# Patient Record
Sex: FEMALE | Race: WHITE | Employment: UNEMPLOYED | URBAN - METROPOLITAN AREA
[De-identification: names, ages, dates, MRNs, and addresses within clinical notes are randomized per-mention and may not be internally consistent; named-entity substitution may affect disease eponyms.]

---

## 2017-05-24 ENCOUNTER — HOSPITAL ENCOUNTER (INPATIENT)
Age: 22
LOS: 6 days | Discharge: HOME | DRG: 753 | End: 2017-05-31
Attending: EMERGENCY MEDICINE | Admitting: PSYCHIATRY & NEUROLOGY
Payer: MEDICAID

## 2017-05-24 DIAGNOSIS — R46.89 AGGRESSIVE BEHAVIOR: ICD-10-CM

## 2017-05-24 DIAGNOSIS — R45.851 SUICIDAL IDEATION: Primary | ICD-10-CM

## 2017-05-24 DIAGNOSIS — N30.00 ACUTE CYSTITIS WITHOUT HEMATURIA: ICD-10-CM

## 2017-05-24 LAB
ALBUMIN SERPL BCP-MCNC: 3.2 G/DL (ref 3.4–5)
ALBUMIN/GLOB SERPL: 0.9 {RATIO} (ref 0.8–1.7)
ALP SERPL-CCNC: 94 U/L (ref 45–117)
ALT SERPL-CCNC: 24 U/L (ref 13–56)
AMORPH CRY URNS QL MICRO: ABNORMAL
AMPHET UR QL SCN: POSITIVE
ANION GAP BLD CALC-SCNC: 8 MMOL/L (ref 3–18)
APAP SERPL-MCNC: <2 UG/ML (ref 10–30)
APPEARANCE UR: CLEAR
AST SERPL W P-5'-P-CCNC: 10 U/L (ref 15–37)
BACTERIA URNS QL MICRO: ABNORMAL /HPF
BARBITURATES UR QL SCN: NEGATIVE
BASOPHILS # BLD AUTO: 0.1 K/UL (ref 0–0.1)
BASOPHILS # BLD: 1 % (ref 0–2)
BENZODIAZ UR QL: NEGATIVE
BILIRUB DIRECT SERPL-MCNC: <0.1 MG/DL (ref 0–0.2)
BILIRUB SERPL-MCNC: 0.2 MG/DL (ref 0.2–1)
BILIRUB UR QL: NEGATIVE
BUN SERPL-MCNC: 12 MG/DL (ref 7–18)
BUN/CREAT SERPL: 14 (ref 12–20)
CALCIUM SERPL-MCNC: 9.2 MG/DL (ref 8.5–10.1)
CANNABINOIDS UR QL SCN: NEGATIVE
CHLORIDE SERPL-SCNC: 109 MMOL/L (ref 100–108)
CO2 SERPL-SCNC: 23 MMOL/L (ref 21–32)
COCAINE UR QL SCN: NEGATIVE
COLOR UR: YELLOW
CREAT SERPL-MCNC: 0.84 MG/DL (ref 0.6–1.3)
DIFFERENTIAL METHOD BLD: ABNORMAL
EOSINOPHIL # BLD: 0.2 K/UL (ref 0–0.4)
EOSINOPHIL NFR BLD: 2 % (ref 0–5)
EPITH CASTS URNS QL MICRO: ABNORMAL /LPF (ref 0–5)
ERYTHROCYTE [DISTWIDTH] IN BLOOD BY AUTOMATED COUNT: 13.1 % (ref 11.6–14.5)
ETHANOL SERPL-MCNC: <3 MG/DL (ref 0–3)
GLOBULIN SER CALC-MCNC: 3.7 G/DL (ref 2–4)
GLUCOSE SERPL-MCNC: 84 MG/DL (ref 74–99)
GLUCOSE UR STRIP.AUTO-MCNC: NEGATIVE MG/DL
HCG UR QL: NEGATIVE
HCT VFR BLD AUTO: 41 % (ref 35–45)
HDSCOM,HDSCOM: ABNORMAL
HGB BLD-MCNC: 14 G/DL (ref 12–16)
HGB UR QL STRIP: NEGATIVE
KETONES UR QL STRIP.AUTO: ABNORMAL MG/DL
LEUKOCYTE ESTERASE UR QL STRIP.AUTO: ABNORMAL
LYMPHOCYTES # BLD AUTO: 28 % (ref 21–52)
LYMPHOCYTES # BLD: 2.8 K/UL (ref 0.9–3.6)
MCH RBC QN AUTO: 31.2 PG (ref 24–34)
MCHC RBC AUTO-ENTMCNC: 34.1 G/DL (ref 31–37)
MCV RBC AUTO: 91.3 FL (ref 74–97)
METHADONE UR QL: NEGATIVE
MONOCYTES # BLD: 0.7 K/UL (ref 0.05–1.2)
MONOCYTES NFR BLD AUTO: 7 % (ref 3–10)
MUCOUS THREADS URNS QL MICRO: ABNORMAL /LPF
NEUTS SEG # BLD: 6.4 K/UL (ref 1.8–8)
NEUTS SEG NFR BLD AUTO: 62 % (ref 40–73)
NITRITE UR QL STRIP.AUTO: NEGATIVE
OPIATES UR QL: NEGATIVE
PCP UR QL: NEGATIVE
PH UR STRIP: 5 [PH] (ref 5–8)
PLATELET # BLD AUTO: 219 K/UL (ref 135–420)
PMV BLD AUTO: 13 FL (ref 9.2–11.8)
POTASSIUM SERPL-SCNC: 3.7 MMOL/L (ref 3.5–5.5)
PROT SERPL-MCNC: 6.9 G/DL (ref 6.4–8.2)
PROT UR STRIP-MCNC: NEGATIVE MG/DL
RBC # BLD AUTO: 4.49 M/UL (ref 4.2–5.3)
RBC #/AREA URNS HPF: ABNORMAL /HPF (ref 0–5)
SALICYLATES SERPL-MCNC: <2.8 MG/DL (ref 2.8–20)
SODIUM SERPL-SCNC: 140 MMOL/L (ref 136–145)
SP GR UR REFRACTOMETRY: 1.03 (ref 1–1.03)
UROBILINOGEN UR QL STRIP.AUTO: 1 EU/DL (ref 0.2–1)
WBC # BLD AUTO: 10.1 K/UL (ref 4.6–13.2)
WBC URNS QL MICRO: ABNORMAL /HPF (ref 0–4)

## 2017-05-24 PROCEDURE — 81025 URINE PREGNANCY TEST: CPT | Performed by: PHYSICIAN ASSISTANT

## 2017-05-24 PROCEDURE — 80307 DRUG TEST PRSMV CHEM ANLYZR: CPT | Performed by: PHYSICIAN ASSISTANT

## 2017-05-24 PROCEDURE — 99283 EMERGENCY DEPT VISIT LOW MDM: CPT

## 2017-05-24 PROCEDURE — 85025 COMPLETE CBC W/AUTO DIFF WBC: CPT | Performed by: PHYSICIAN ASSISTANT

## 2017-05-24 PROCEDURE — 80076 HEPATIC FUNCTION PANEL: CPT | Performed by: PHYSICIAN ASSISTANT

## 2017-05-24 PROCEDURE — 80048 BASIC METABOLIC PNL TOTAL CA: CPT | Performed by: PHYSICIAN ASSISTANT

## 2017-05-24 PROCEDURE — 80307 DRUG TEST PRSMV CHEM ANLYZR: CPT | Performed by: EMERGENCY MEDICINE

## 2017-05-24 PROCEDURE — 81001 URINALYSIS AUTO W/SCOPE: CPT | Performed by: EMERGENCY MEDICINE

## 2017-05-24 RX ORDER — TOPIRAMATE 100 MG/1
100 TABLET, FILM COATED ORAL 2 TIMES DAILY
COMMUNITY
End: 2017-05-31

## 2017-05-24 RX ORDER — PRAVASTATIN SODIUM 40 MG/1
40 TABLET ORAL
Status: ON HOLD | COMMUNITY
End: 2017-05-31

## 2017-05-24 NOTE — IP AVS SNAPSHOT
Golda Hodgkin 
 
 
 [address] Patient: Juan Daniel Carlson MRN: [id number] [facility]:14[birth year]53[birth year]33 You are allergic to the following No active allergies Recent Documentation Height Weight BMI OB Status Smoking Status 1.676 m 77.1 kg 27.44 kg/m2 Having regular periods Never Smoker Emergency Contacts Name Discharge Info Relation Home Work Mobile Rossy Michelle DISCHARGE CAREGIVER [3] Parent [1] [phone] [facility] DISCHARGE CAREGIVER [3] Father [15] [phone] About your hospitalization You were admitted on:  May 25, 2017 You last received care in the:  [facility] You were discharged on:  May 31, 2017 Unit phone number:  [phone] Why you were hospitalized Your primary diagnosis was:  Bipolar 1 Disorder (Hcc) Your diagnoses also included:  Attention Deficit Hyperactivity Disorder (Adhd), Combined Type Providers Seen During Your Hospitalizations Provider Role Specialty Primary office phone Anna Skelton MD Attending Provider Emergency Medicine [phone] Yuki Kang MD Attending Provider Psychiatry [phone] Your Primary Care Physician (PCP) Primary Care Physician Office Phone Office Fax Ciradeacon Chrissy [phone] Follow-up Information Follow up With Details Comments Contact Info The patient is currently connected with the [facility] and the  has been providing updates to Ms. Ardelle Cogan [phone]. The patient has an appointment at [facility] with Terry Masterson on 6/14/17 at 3 pm. The address and contact number is [address] [place] [address], [address] [place] [address]; Phone: [phone]; Fax: [phone]. The patient is also seen at [facility] by Ms. Abraham Seaman; has an appointment on 7/19/17 at 10:15 am. The address and contact number is [address], [address], [place], [address] [facility]; Phone: [phone]; Fax: [phone]) The patient has home therapy with [address] located at [address] [address]; Fax: [phone]. Her worker is Ms. Mandy Hopper [phone] The patient is also connected with [facility] and has an evaluation scheduled (with Maxwell Morse) on 6/28/17; she will also be learning life skills with them. Numbers to remember card given Current Discharge Medication List  
  
START taking these medications Dose & Instructions Dispensing Information Comments Morning Noon Evening Bedtime  
 cephALEXin 500 mg capsule Commonly known as:  Nahid Phy Your last dose was: Your next dose is:    
   
   
 Dose:  500 mg Take 1 Cap by mouth two (2) times a day. Indications: infection Quantity:  1 Cap Refills:  0  
     
   
   
   
  
 guanFACINE IR 2 mg IR tablet Commonly known as:  Shaun Covarrubias Replaces:  Intuniv 4 mg Tb24 ER tablet Your last dose was: Your next dose is:    
   
   
 Dose:  2 mg Take 1 Tab by mouth daily (with dinner). Indications: ATTENTION-DEFICIT HYPERACTIVITY DISORDER Quantity:  30 Tab Refills:  0 OXcarbaxepine 600 mg tablet Commonly known as:  TRILEPTAL Your last dose was: Your next dose is:    
   
   
 Dose:  600 mg Take 1 Tab by mouth two (2) times a day. Indications: mood stabilization Quantity:  60 Tab Refills:  0 CONTINUE these medications which have CHANGED Dose & Instructions Dispensing Information Comments Morning Noon Evening Bedtime  
 lisdexamfetamine 30 mg capsule Commonly known as:  VYVANSE What changed:   
- medication strength - when to take this Your last dose was:     
   
Your next dose is:    
   
   
 Dose:  30 mg  
 Take 1 Cap (30 mg total) by mouth dailyIndications: ATTENTION-DEFICIT HYPERACTIVITY DISORDER. Max Daily Amount: 30 mg  
 Quantity:  30 Cap Refills:  0  
     
   
   
   
  
 ziprasidone 80 mg capsule Commonly known as:  Juan Pringle What changed:   
- medication strength 
- how much to take - when to take this - Another medication with the same name was removed. Continue taking this medication, and follow the directions you see here. Your last dose was: Your next dose is:    
   
   
 Dose:  80 mg Take 1 Cap by mouth two (2) times daily (with meals). Indications: mood stabilization Quantity:  60 Cap Refills:  0 CONTINUE these medications which have NOT CHANGED Dose & Instructions Dispensing Information Comments Morning Noon Evening Bedtime PRAVACHOL 40 mg tablet Generic drug:  pravastatin Your last dose was: Your next dose is:    
   
   
 Dose:  40 mg Take 1 Tab by mouth nightly. Indications: hypercholesterolemia Refills:  0 STOP taking these medications   
 citalopram 20 mg tablet Commonly known as:  Miguelito Hooper Intuniv 4 mg Tb24 ER tablet Generic drug:  guanFACINE ER  
Replaced by:  guanFACINE IR 2 mg IR tablet  
   
  
 topiramate 100 mg tablet Commonly known as:  TOPAMAX Where to Get Your Medications Information on where to get these meds will be given to you by the nurse or doctor. ! Ask your nurse or doctor about these medications  
  cephALEXin 500 mg capsule  
 guanFACINE IR 2 mg IR tablet  
 lisdexamfetamine 30 mg capsule OXcarbaxepine 600 mg tablet  
 ziprasidone 80 mg capsule Discharge Instructions BEHAVIORAL HEALTH NURSING DISCHARGE NOTE The following personal items collected during your admission are returned to you:  
Dental Appliance: Dental Appliances:  
Vision:   
Hearing Aid:   
Jewelry: Jewelry:  
Clothing: Clothing: Other Valuables: Other Valuables:  
Valuables sent to safe: Personal Items Sent to Safe:  
 
 
 
PATIENT INSTRUCTIONS: 
 
Diet: Regular The discharge information has been reviewed with the patient. The patient verbalized understanding. Patient armband removed and shredded. Discharge Orders None Introducing [facility]! Sara Cantu introduces [employer] patient portal. Now you can access parts of your medical record, email your doctor's office, and request medication refills online. 1. In your internet browser, go to [email or web address] 2. Click on the First Time User? Click Here link in the Sign In box. You will see the New Member Sign Up page. 3. Enter your Data[employer] Access Code exactly as it appears below. You will not need to use this code after youve completed the sign-up process. If you do not sign up before the expiration date, you must request a new code. · Data[employer] Access Code: [email or web address] Expires: 8/23/2017  7:32 AM 
 
4. Enter the last four digits of your Social Security Number (xxxx) and Date of Birth (mm/dd/yyyy) as indicated and click Submit. You will be taken to the next sign-up page. 5. Create a Data[employer]raise ID. This will be your [employer] login ID and cannot be changed, so think of one that is secure and easy to remember. 6. Create a [employer] password. You can change your password at any time. 7. Enter your Password Reset Question and Answer. This can be used at a later time if you forget your password. 8. Enter your e-mail address. You will receive e-mail notification when new information is available in [address] 9. Click Sign Up. You can now view and download portions of your medical record. 10. Click the Download Summary menu link to download a portable copy of your medical information. If you have questions, please visit the Frequently Asked Questions section of the [employer] website.  Remember, [employer] is NOT to be used for urgent needs. For medical emergencies, dial 911. Now available from your iPhone and Android! General Information Please provide this summary of care documentation to your next provider. Patient Signature:  ____________________________________________________________ Date:  ____________________________________________________________  
  
Leonidas End Provider Signature:  ____________________________________________________________ Date:  ____________________________________________________________

## 2017-05-25 PROBLEM — F31.9 BIPOLAR 1 DISORDER (HCC): Status: ACTIVE | Noted: 2017-05-25

## 2017-05-25 PROCEDURE — 74011250637 HC RX REV CODE- 250/637: Performed by: PHYSICIAN ASSISTANT

## 2017-05-25 PROCEDURE — 74011250637 HC RX REV CODE- 250/637: Performed by: PSYCHIATRY & NEUROLOGY

## 2017-05-25 PROCEDURE — 65220000003 HC RM SEMIPRIVATE PSYCH

## 2017-05-25 RX ORDER — HALOPERIDOL 5 MG/1
5 TABLET ORAL
Status: DISCONTINUED | OUTPATIENT
Start: 2017-05-25 | End: 2017-05-31 | Stop reason: HOSPADM

## 2017-05-25 RX ORDER — HYDROXYZINE PAMOATE 50 MG/1
50 CAPSULE ORAL
Status: DISCONTINUED | OUTPATIENT
Start: 2017-05-25 | End: 2017-05-31 | Stop reason: HOSPADM

## 2017-05-25 RX ORDER — GUANFACINE HYDROCHLORIDE 1 MG/1
2 TABLET ORAL
Status: DISCONTINUED | OUTPATIENT
Start: 2017-05-25 | End: 2017-05-31 | Stop reason: HOSPADM

## 2017-05-25 RX ORDER — ZIPRASIDONE HYDROCHLORIDE 40 MG/1
40 CAPSULE ORAL
Status: DISCONTINUED | OUTPATIENT
Start: 2017-05-25 | End: 2017-05-28

## 2017-05-25 RX ORDER — CEPHALEXIN 250 MG/1
500 CAPSULE ORAL 2 TIMES DAILY
Status: DISCONTINUED | OUTPATIENT
Start: 2017-05-25 | End: 2017-05-31 | Stop reason: HOSPADM

## 2017-05-25 RX ORDER — HALOPERIDOL 5 MG/ML
5 INJECTION INTRAMUSCULAR
Status: DISCONTINUED | OUTPATIENT
Start: 2017-05-25 | End: 2017-05-31 | Stop reason: HOSPADM

## 2017-05-25 RX ORDER — TOPIRAMATE 100 MG/1
100 TABLET, FILM COATED ORAL
Status: DISCONTINUED | OUTPATIENT
Start: 2017-05-25 | End: 2017-05-26

## 2017-05-25 RX ORDER — IBUPROFEN 600 MG/1
600 TABLET ORAL
Status: DISCONTINUED | OUTPATIENT
Start: 2017-05-25 | End: 2017-05-31 | Stop reason: HOSPADM

## 2017-05-25 RX ORDER — CEPHALEXIN 250 MG/1
500 CAPSULE ORAL
Status: COMPLETED | OUTPATIENT
Start: 2017-05-25 | End: 2017-05-25

## 2017-05-25 RX ORDER — TRAZODONE HYDROCHLORIDE 50 MG/1
50 TABLET ORAL
Status: DISCONTINUED | OUTPATIENT
Start: 2017-05-25 | End: 2017-05-28

## 2017-05-25 RX ORDER — LORAZEPAM 2 MG/ML
1-2 INJECTION INTRAMUSCULAR
Status: DISCONTINUED | OUTPATIENT
Start: 2017-05-25 | End: 2017-05-31 | Stop reason: HOSPADM

## 2017-05-25 RX ORDER — CITALOPRAM 20 MG/1
20 TABLET, FILM COATED ORAL DAILY
Status: DISCONTINUED | OUTPATIENT
Start: 2017-05-25 | End: 2017-05-25

## 2017-05-25 RX ORDER — TOPIRAMATE 25 MG/1
50 TABLET ORAL
Status: DISCONTINUED | OUTPATIENT
Start: 2017-05-25 | End: 2017-05-26

## 2017-05-25 RX ADMIN — CEPHALEXIN 500 MG: 250 CAPSULE ORAL at 20:38

## 2017-05-25 RX ADMIN — TOPIRAMATE 50 MG: 25 TABLET, FILM COATED ORAL at 08:42

## 2017-05-25 RX ADMIN — CEPHALEXIN 500 MG: 250 CAPSULE ORAL at 08:41

## 2017-05-25 RX ADMIN — ZIPRASIDONE HYDROCHLORIDE 40 MG: 40 CAPSULE ORAL at 08:57

## 2017-05-25 RX ADMIN — GUANFACINE HYDROCHLORIDE 2 MG: 1 TABLET ORAL at 16:58

## 2017-05-25 RX ADMIN — TOPIRAMATE 100 MG: 100 TABLET, FILM COATED ORAL at 16:59

## 2017-05-25 RX ADMIN — CEPHALEXIN 500 MG: 250 CAPSULE ORAL at 04:45

## 2017-05-25 RX ADMIN — LISDEXAMFETAMINE DIMESYLATE 30 MG: 30 CAPSULE ORAL at 08:42

## 2017-05-25 RX ADMIN — CITALOPRAM HYDROBROMIDE 20 MG: 20 TABLET ORAL at 13:25

## 2017-05-25 RX ADMIN — ZIPRASIDONE HCL 120 MG: 80 CAPSULE ORAL at 16:59

## 2017-05-25 NOTE — BSMART NOTE
OCCUPATIONAL THERAPY PROGRESS NOTE    Group Time:  2287  Attendance: The patient attended full group. .  Participation:  The patient participated fully in the activity. Attention:  The patient was able to focus on the activity. Interaction:  The patient occasionally  interacts with others. Able to focus on activity, concrete at times, ID of needing to change her \"temper\" and talked about things she could do when angry to help this, which were appropriate. Did say she was hoping to go to \"group home\" or apartment.
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
## 2017-05-25 NOTE — BH NOTES
GROUP THERAPY PROGRESS NOTE    Walker Ruiz is participating in Recreational Therapy.      Group time: 30 min    Personal goal for participation: Keeping body healthy    Goal orientation: relaxation    Group therapy participation: active    Therapeutic interventions reviewed and discussed: Ways to maintain good health    Impression of participation: Pt participated in group

## 2017-05-25 NOTE — ED NOTES
Per care giver, pt has been aggressive and uncooperative recently. She has been dating a boy with autism and he has not been able to visit her. Caregiver believes this may be the catalyst to her behavior. Caregiver has a large plot of land and pt wandered off today. She states she wants to hurt herself by consuming sugar because she is prediabetic. She has no other plan. She does have hx of bipolar, ADHD, ODD, and mild MR.

## 2017-05-25 NOTE — BSMART NOTE
GROUP THERAPY PROGRESS NOTE    Nelli Benton is participating in [place]. Group time: 30 minutes    Personal goal for participation: to go home    Goal orientation: community    Group therapy participation: active    Therapeutic interventions reviewed and discussed: unit rules an guidelines    Impression of participation: pt attended group.

## 2017-05-25 NOTE — H&P
History and Physical        Patient: Carlos Osuna               Sex: female          DOA: 5/24/2017         YOB: 1995      Age:  22 y.o.        LOS:  LOS: 0 days        HPI:     Carlos Osuna is a 22 y.o. female who was admitted experiencing suicidal ideation, out of control behavior, and increased aggression    Active Problems:    Bipolar 1 disorder ([facility] (5/25/2017)        Past Medical History:   Diagnosis Date    ADHD (attention deficit hyperactivity disorder)     Bipolar disorder ([facility]     Borderline personality disorder     Mild mental retardation     PMS (premenstrual syndrome)     Thromboembolus ([facility] 2013    PE       History reviewed. No pertinent surgical history. History reviewed. No pertinent family history. Social History     Social History    Marital status: SINGLE     Spouse name: N/A    Number of children: NONE    Years of education: MS degree     Social History Main Topics    Smoking status: Never Smoker    Smokeless tobacco: None    Alcohol use Yes    Drug use: No    Sexual activity: No     Other Topics Concern    None     Social History Narrative   Patient states she lives with her brother. She he appetite fluctuates and her sleep has been okay. She is unemployed. Prior to Admission medications    Medication Sig Start Date End Date Taking? Authorizing Provider   topiramate (TOPAMAX) 100 mg tablet Take 100 mg by mouth two (2) times a day. Yes Joe Vázquez MD   pravastatin (PRAVACHOL) 40 mg tablet Take 40 mg by mouth nightly. Yes Joe Vázquez MD   ziprasidone hcl (GEODON) 60 mg capsule Take 120 mg by mouth nightly. Yes Historical Provider   ziprasidone (GEODON) 40 mg capsule Take 40 mg by mouth every morning. Yes Historical Provider   citalopram (CELEXA) 20 mg tablet Take 40 mg by mouth daily. Yes Joe Vázquez MD   Guanfacine (INTUNIV) 4 mg Tb24 Take 1 mg by mouth two (2) times a day.    Yes Joe Vázquez MD   Lisdexamfetamine (VYVANSE) 50 mg capsule Take 30 mg by mouth every morning. Yes Phys Other, MD       No Known Allergies    Review of Systems  A comprehensive review of systems was negative. Physical Exam:      Visit Vitals    /78    Pulse 89    Temp 97.6 °F (36.4 °C)    Resp 20    Ht 5' 6\" (1.676 m)    Wt 170 lb (77.1 kg)    SpO2 99%    BMI 27.44 kg/m2       Physical Exam:    General:  Alert, cooperative, well developed, well nourished, obese  female,no distress, appears stated age. Eyes:  Conjunctivae/corneas clear. PERRL, EOMs intact. Fundi benign   Ears:  Normal TMs and external ear canals both ears. Nose: Nares normal. Septum midline. Mucosa normal. No drainage or sinus tenderness. Mouth/Throat: Lips, mucosa, and tongue normal. Teeth and gums normal.   Neck: Supple, symmetrical, trachea midline, no adenopathy, thyroid: no enlargement/tenderness/nodules, no carotid bruit and no JVD. Back:   Symmetric, no curvature. ROM normal. No CVA tenderness. Lungs:   Clear to auscultation bilaterally. Heart:  Regular rate and rhythm, S1, S2 normal, no murmur, click, rub or gallop. Abdomen:   Soft, non-tender. Bowel sounds normal. No masses,  No organomegaly. Extremities: Extremities normal, atraumatic, no cyanosis or edema. Pulses: 2+ and symmetric all extremities. Skin: Skin color, texture, turgor normal. No rashes or lesions   Lymph nodes: Cervical, supraclavicular, and axillary nodes normal.   Neurologic: CNII-XII intact. Normal strength, sensation and reflexes throughout.            Assessment/Plan     Aggression  Out of control behavior  Suicidal ideation  Labs reviewed (Abnormal UA) Continue Keflex  Continue treatment per physician's orders

## 2017-05-25 NOTE — ED TRIAGE NOTES
Pt states she wants to kill herself by eating a lot of sugar because she is prediabetic. Pt brought by her caregiver.
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
## 2017-05-25 NOTE — BSMART NOTE
Pt ate 68 percent of breakfast and lunch. Pt attended and participated in the community group. Pt wear the non skids sock and have knowledge of fall. Pt played games and interacted with peers in the day area. pt denied any self harm and suicidal ideation during shift. Will keep monitor pt for safety and location.

## 2017-05-25 NOTE — BH NOTES
6121 Patient arrived on the unit escorted by 2 security officers in a wheelchair. Patient is alert and orient to self, she was unable to give writer place or time. Patient is impulsive and has no in sight  She stated to writer that she wanted to kill herself by eating sugar because she loves sugar. Patient contracted for safety. She stated to writer she wanted to come her to receive help and not end up like her parents. She stated she thought about hurting them but she had no plan. Patient was spoke loudly and required frequent redirection. Patient was oriented to unit and to her room. She was offered something to eat. Patient is monitored every 15 minutes for safety.

## 2017-05-26 PROCEDURE — 65220000003 HC RM SEMIPRIVATE PSYCH

## 2017-05-26 PROCEDURE — 74011250637 HC RX REV CODE- 250/637: Performed by: PSYCHIATRY & NEUROLOGY

## 2017-05-26 RX ORDER — POLYETHYLENE GLYCOL 3350 17 G/17G
17 POWDER, FOR SOLUTION ORAL
Status: DISCONTINUED | OUTPATIENT
Start: 2017-05-26 | End: 2017-05-31 | Stop reason: HOSPADM

## 2017-05-26 RX ORDER — TOPIRAMATE 100 MG/1
100 TABLET, FILM COATED ORAL 2 TIMES DAILY WITH MEALS
Status: DISCONTINUED | OUTPATIENT
Start: 2017-05-27 | End: 2017-05-28

## 2017-05-26 RX ADMIN — TRAZODONE HYDROCHLORIDE 50 MG: 50 TABLET ORAL at 20:01

## 2017-05-26 RX ADMIN — CEPHALEXIN 500 MG: 250 CAPSULE ORAL at 20:01

## 2017-05-26 RX ADMIN — CEPHALEXIN 500 MG: 250 CAPSULE ORAL at 09:38

## 2017-05-26 RX ADMIN — ZIPRASIDONE HCL 120 MG: 80 CAPSULE ORAL at 16:49

## 2017-05-26 RX ADMIN — LISDEXAMFETAMINE DIMESYLATE 30 MG: 30 CAPSULE ORAL at 11:07

## 2017-05-26 RX ADMIN — TOPIRAMATE 50 MG: 25 TABLET, FILM COATED ORAL at 09:37

## 2017-05-26 RX ADMIN — TOPIRAMATE 100 MG: 100 TABLET, FILM COATED ORAL at 16:49

## 2017-05-26 RX ADMIN — GUANFACINE HYDROCHLORIDE 2 MG: 1 TABLET ORAL at 16:49

## 2017-05-26 RX ADMIN — ZIPRASIDONE HYDROCHLORIDE 40 MG: 40 CAPSULE ORAL at 09:38

## 2017-05-26 NOTE — PROGRESS NOTES
Patient has been cooperative and pleasant; stated that she is here because \"I like hitting people. \" encourage patient to not hit others; patient verbalized to seek staff, if thoughts of harm to self or other arise. Patient visited with her father and twin sister this evening; stated that she had a good visit with family; patient is compliant with medications and showered this pm; will monitor and maintain safe environment.

## 2017-05-26 NOTE — BH NOTES
GROUP THERAPY PROGRESS NOTE    Gaudencio Waldrop is participating in [place]. Group time: 30 minutes    Goal orientation: community    Group therapy participation: active    Therapeutic interventions reviewed and discussed: Unit guidelines/ self-esteem group    Impression of participation: Patient participated in group discussion.

## 2017-05-26 NOTE — BSMART NOTE
SW COLLATERAL: The SW contacted the patient's father (Mr. Kang Franklin [phone]) at her request to get community provider information. The SW also made the corresponding appointments for continuity of care. The SW contacted the [facility] Coordinator Ms. Delphine Kiran to provide an update on the patient's progress. The patient is currently connected with the [facility] and the SW has been providing updates to Ms. Delphine Kiran [phone]. The patient has an appointment at [facility] with Gilmar Fisher on 6/14/17 at 3 pm. The address and contact number is [address] [place] [address], [address] [place] [address]; Phone: [phone]; Fax: [phone]. The patient is also seen at [facility] by Ms. Wu Hernandez; has an appointment on 7/19/17 at 10:15 am. The address and contact number is [address], [address]; Phone: [phone]; Fax: [phone]    The patient has home therapy with [address] located at [address] [place] [address]. [phone]; Fax: [phone]. Her worker is Ms. Ovi Johnson [phone]    The patient is also connected with [facility] and has an evaluation scheduled (with Hari Bernal) on 6/28/17; she will also be learning life skills with them. Her labor review was completed last week.

## 2017-05-26 NOTE — PROGRESS NOTES
Problem: Suicide/Homicide (Adult/Pediatric)  Goal: *STG: Remains safe in hospital  Patient will not verbalize any harm to self while in the hospital.   Outcome: Progressing Towards Goal  Patient has remained free of harm to self and others while in facility. Goal: *STG/LTG: Complies with medication therapy  Patient will be compliant with medications while in hospital.   Outcome: Progressing Towards Goal  Patient has been compliant with prescribed medication. Comments:   Patient requested to sleep late however when awakened spent majority of the morning in the milieu with medication and unit activities compliance. Patient has been calm and cooperative upon approach with AM hygiene care completed with minimal assistance required. Patient has been social and interactive within milieu with no behaviors noted. Patient advised writer that he wants to go to a group home I want to go to a group home, sometimes I dont get along with my mom. My BF does some bad things and I do it too, but I shouldn't right. Patient denies auditory hallucinations, denies suicidal ideation with no safety issues noted. Will continue to monitor for safety with education and encouragement provided throughout treatment regimen.

## 2017-05-26 NOTE — BH NOTES
GROUP THERAPY PROGRESS NOTE    Bryant Obando is participating in [place]. Group time: 15 minutes    Personal goal for participation: \"Take a shower today and call my parents\"    Goal orientation: community    Group therapy participation: active    Therapeutic interventions reviewed and discussed: Unit guidelines    Impression of participation: Patient participated in group discussion.
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
## 2017-05-26 NOTE — BSMART NOTE
OCCUPATIONAL THERAPY PROGRESS NOTE  Group Time:  9224  Attendance: The patient attended full group. Participation:  The patient participated fully in the activity. Attention:  The patient needed redirection to activity at least once. Interaction:  The patient occasionally  interacts with others. General ability to respond to concrete questions, difficulty with specifics. When asked to say something about someone important in her life, can only relate as it pertains to her [facility] cares about me\", \" she is nice to me\"  Type of responses and unable to ID something about the person separate from herself. Able to read, some limitations in understanding. Talks about her \"boyfriend\". Plans to return home and maybe go to a \"group home\" later. Accuracy unknown. Concept of time limited.

## 2017-05-26 NOTE — BSMART NOTE
ART ACTIVITY PROGRESS NOTE    PATIENT SCHEDULED FOR GROUP AT :  1000    ART ACTIVITY:  Paper [employer]    ATTENDANCE : patient attended approximately 25% of the group. PARTICIPATION LEVEL : Participates fully in the art process. ATTENTION LEVEL : Able to focus on task. Patient came willingly to group. She readily engaged in the activity and completed one project, before being called out the the . During introductions, she spoke a little loud and was gently shushed by another patient. She did not respond negatively to being shushed and basically complied. Patient fairly easily engaged in and completed the activity. She asked if she could keep it and was assured that she could. Patient did not initiate any social interaction, speaking only to ask task related questions. Affect appeared reactive and appropriate to expressed thought.

## 2017-05-26 NOTE — BSMART NOTE
SW SESSION: The SW met with the patient to assess reason for admission and needs. The patient is a 22year old  single female that was admitted due to HI against her parents. The patient has a history of mood instability and anger outburst; resides with her father and step mother in [place], [place]. She plans to return home upon discharge and resume services already in place. The patient denied current thoughts of self-harm, SI/HI, intent, AVH, history of abuse, alcohol and other illicit substance use as well as concerns regarding her medications, health and safety. The patient presented with good eye contact, was responsive, alert and amenable. She receives SSI benefits; however, she is unsure of the amount. The patient could not provide further information and asked the SW to contact her father Mr. Carly Alvarez at [phone]. The SW will contact the patient to get coordination of care information so that follow-up appointments can be made.

## 2017-05-26 NOTE — BH NOTES
GROUP THERAPY PROGRESS NOTE    Bryant Obando is not participating in [employer], despite staff encouragement.  Pt stated that she was tired and wanted to rest.

## 2017-05-27 PROCEDURE — 74011250637 HC RX REV CODE- 250/637: Performed by: PSYCHIATRY & NEUROLOGY

## 2017-05-27 PROCEDURE — 65220000003 HC RM SEMIPRIVATE PSYCH

## 2017-05-27 RX ORDER — FLUCONAZOLE 150 MG/1
150 TABLET ORAL
Status: COMPLETED | OUTPATIENT
Start: 2017-05-27 | End: 2017-05-27

## 2017-05-27 RX ADMIN — TOPIRAMATE 100 MG: 100 TABLET, FILM COATED ORAL at 17:02

## 2017-05-27 RX ADMIN — TRAZODONE HYDROCHLORIDE 50 MG: 50 TABLET ORAL at 20:31

## 2017-05-27 RX ADMIN — POLYETHYLENE GLYCOL 3350 17 G: 17 POWDER, FOR SOLUTION ORAL at 12:16

## 2017-05-27 RX ADMIN — FLUCONAZOLE 150 MG: 150 TABLET ORAL at 14:19

## 2017-05-27 RX ADMIN — CEPHALEXIN 500 MG: 250 CAPSULE ORAL at 20:31

## 2017-05-27 RX ADMIN — GUANFACINE HYDROCHLORIDE 2 MG: 1 TABLET ORAL at 17:02

## 2017-05-27 RX ADMIN — CEPHALEXIN 500 MG: 250 CAPSULE ORAL at 08:18

## 2017-05-27 RX ADMIN — LISDEXAMFETAMINE DIMESYLATE 30 MG: 30 CAPSULE ORAL at 09:37

## 2017-05-27 RX ADMIN — ZIPRASIDONE HYDROCHLORIDE 40 MG: 40 CAPSULE ORAL at 08:18

## 2017-05-27 RX ADMIN — ZIPRASIDONE HCL 120 MG: 80 CAPSULE ORAL at 17:02

## 2017-05-27 RX ADMIN — TOPIRAMATE 100 MG: 100 TABLET, FILM COATED ORAL at 08:18

## 2017-05-27 NOTE — BH NOTES
Patient has been social and interactive within milieu with medication compliance. Patient received visit from family members with verbalization of satisfaction. Patient has showered and has been open to education with no negative behaviors noted. Patient  Has been able to verbalize needs to staff when in need. Patient denies homicidal or suicidal ideation with no safety issues noted, denies auditory hallucinations. Will continue to monitor for safety with support as needed throughout treatment regimen.

## 2017-05-27 NOTE — BH NOTES
GROUP THERAPY PROGRESS NOTE    Hansa Dougherty is participating in [place].      Group time: 30 minutes    Personal goal for participation: talk to the Dr    Goal orientation: community    Group therapy participation: minimal    Therapeutic interventions reviewed and discussed: goals and procedures    Impression of participation: encouraged

## 2017-05-27 NOTE — BH NOTES
Psychiatry progress note     Chart reviewed, patient interviewed. Case discussed with nursing staff. Patient says she feels good and slept okay. Stated she is not having \"crazy thoughts\" she had discussed before. Says her meds are good. Stated, \"Do I go home in 7 days? \"     On exam, childlike. Brief answers. Calmer today. Less excited/intrusive. No SI, HI or AVH. Insight and judgment fair.      Meds - Topamax 50 qDay, 100 qHS; Geodon 40 qDay, 120 qHS; Vyvanse 30 qDay.     Assessment - Bipolar I disorder, most recent episode manic, Attention deficit hyperactivity disorder, combined type, Mild intellectual disability. Patient has a confusing mixture of jana/disinhibition/childlike behavior. Stabilizing. Plan is to continue current treatment plan. Patient given Diflucan for yeast infection.

## 2017-05-27 NOTE — BH NOTES
Psychiatry progress note    Chart reviewed, patient interviewed. Case discussed with nursing staff. Patient appeared with elevated mood and was intrusive. Said she wanted glucose checks. However, nursing had discussed with father and patient does not do this at home. Patient says her mood is good. Complained her mind is \"still doing it\" - making her angry. Reported feeling shakey. Difficult to engage in a meaningful discussion. On exam, excited/loud, intrusive. Childlike demeanor. No SI, HI or AVH. Insight and judgment fair. Meds -  Topamax 50 qDay, 100 qHS; Geodon 40 qDay, 120 qHS; Vyvanse 30 qDay. Assessment - Bipolar I disorder, most recent episode manic, Attention deficit hyperactivity disorder, combined type, Mild intellectual disability. Patient has a confusing mixture of jana/disinhibition/childlike behavior. Plan is to increase Topamax to 100 BID and continue current treatment plan.

## 2017-05-27 NOTE — PROGRESS NOTES
Problem: Suicide/Homicide (Adult/Pediatric)  Goal: *STG: Remains safe in hospital  Patient will not verbalize any harm to self while in the hospital.   Outcome: Progressing Towards Goal  Patient has remained free of harm to self and others while in facility. Goal: *STG/LTG: Complies with medication therapy  Patient will be compliant with medications while in hospital.   Outcome: Progressing Towards Goal  Patient has been compliant with prescribed medication. Comments:   Patient complaining of yeast infection \"I'm on antibiotics, I have a yeast infection\". Patient's mother contacted regarding patient's medication history with patient stating \"yes she has a history of getting yeast infections when she is on antibiotics. I can't remember what I take call my mom\". Patient is currently on Keflex. Patient's mother provided pharmacy name to obtain medication dosage after stating \"she take that one dose pill, Diflucan that's it. I don't remember\" when asked for medication dosage. Pharmacy contacted with dosage provided. MD contacted regarding patient request and need with orders for patient to receive Diflucan 150 mg once. Patient verbalized satisfaction with medication she will receive for her \"yeast\" complaints. Patient denies suicidal/homicidal ideation with no safety issues noted this shift. Will continue to monitor for safety with support as needed throughout treatment regimen.

## 2017-05-27 NOTE — BH NOTES
Patient received Miralax per request for complaints of constipation with verbalization of satisfaction.

## 2017-05-28 PROCEDURE — 65220000003 HC RM SEMIPRIVATE PSYCH

## 2017-05-28 PROCEDURE — 74011250637 HC RX REV CODE- 250/637: Performed by: PSYCHIATRY & NEUROLOGY

## 2017-05-28 RX ORDER — ZIPRASIDONE HYDROCHLORIDE 80 MG/1
80 CAPSULE ORAL 2 TIMES DAILY
Status: DISCONTINUED | OUTPATIENT
Start: 2017-05-28 | End: 2017-05-31 | Stop reason: HOSPADM

## 2017-05-28 RX ORDER — OXCARBAZEPINE 300 MG/1
300 TABLET, FILM COATED ORAL 2 TIMES DAILY
Status: DISCONTINUED | OUTPATIENT
Start: 2017-05-28 | End: 2017-05-29

## 2017-05-28 RX ADMIN — CEPHALEXIN 500 MG: 250 CAPSULE ORAL at 08:59

## 2017-05-28 RX ADMIN — ZIPRASIDONE HYDROCHLORIDE 40 MG: 40 CAPSULE ORAL at 08:59

## 2017-05-28 RX ADMIN — TOPIRAMATE 100 MG: 100 TABLET, FILM COATED ORAL at 08:59

## 2017-05-28 RX ADMIN — GUANFACINE HYDROCHLORIDE 2 MG: 1 TABLET ORAL at 17:43

## 2017-05-28 RX ADMIN — LISDEXAMFETAMINE DIMESYLATE 30 MG: 30 CAPSULE ORAL at 09:34

## 2017-05-28 RX ADMIN — CEPHALEXIN 500 MG: 250 CAPSULE ORAL at 20:46

## 2017-05-28 RX ADMIN — ZIPRASIDONE HCL 80 MG: 80 CAPSULE ORAL at 20:45

## 2017-05-28 RX ADMIN — OXCARBAZEPINE 300 MG: 300 TABLET, FILM COATED ORAL at 20:46

## 2017-05-28 NOTE — PROGRESS NOTES
Problem: Suicide/Homicide (Adult/Pediatric)  Goal: *STG: Remains safe in hospital  Patient will not verbalize any harm to self while in the hospital.   Outcome: Progressing Towards Goal  Patient has remained free of harm to self and others while in facility. Goal: *STG/LTG: Complies with medication therapy  Patient will be compliant with medications while in hospital.   Outcome: Progressing Towards Goal  Patient has been compliant with prescribed medication. Comments:   Patient has been in and out of milieu with medication compliance. Patient has showered and completed daily hygiene care. Patient has been social and interactive within milieu. Patient at times requires verbal redirection however has not presented as a management issue on the unit. Patient is positive regarding visits with parents and BF. Patent requested information regarding medication changes with MD consulted and parents to be advised of medication changes per their request. Patient denies suicidal/homicidal ideation with no safety issues noted, denies auditory hallucinations. Will continue to monitor for safety with support as needed throughout treatment regimen.

## 2017-05-29 PROCEDURE — 65220000003 HC RM SEMIPRIVATE PSYCH

## 2017-05-29 PROCEDURE — 74011250637 HC RX REV CODE- 250/637: Performed by: PSYCHIATRY & NEUROLOGY

## 2017-05-29 RX ORDER — OXCARBAZEPINE 300 MG/1
600 TABLET, FILM COATED ORAL 2 TIMES DAILY
Status: DISCONTINUED | OUTPATIENT
Start: 2017-05-29 | End: 2017-05-31 | Stop reason: HOSPADM

## 2017-05-29 RX ADMIN — CEPHALEXIN 500 MG: 250 CAPSULE ORAL at 10:34

## 2017-05-29 RX ADMIN — CEPHALEXIN 500 MG: 250 CAPSULE ORAL at 21:02

## 2017-05-29 RX ADMIN — HYDROXYZINE PAMOATE 50 MG: 50 CAPSULE ORAL at 21:03

## 2017-05-29 RX ADMIN — GUANFACINE HYDROCHLORIDE 2 MG: 1 TABLET ORAL at 16:39

## 2017-05-29 RX ADMIN — OXCARBAZEPINE 300 MG: 300 TABLET, FILM COATED ORAL at 10:34

## 2017-05-29 RX ADMIN — IBUPROFEN 600 MG: 600 TABLET ORAL at 21:03

## 2017-05-29 RX ADMIN — LISDEXAMFETAMINE DIMESYLATE 30 MG: 30 CAPSULE ORAL at 10:34

## 2017-05-29 RX ADMIN — ZIPRASIDONE HCL 80 MG: 80 CAPSULE ORAL at 10:34

## 2017-05-29 RX ADMIN — ZIPRASIDONE HCL 80 MG: 80 CAPSULE ORAL at 21:04

## 2017-05-29 RX ADMIN — OXCARBAZEPINE 600 MG: 300 TABLET, FILM COATED ORAL at 21:03

## 2017-05-29 NOTE — BH NOTES
GROUP THERAPY PROGRESS NOTE    Vonnie Dillard is participating in Medication & Discharge education group. Group time: 30 minutes    Personal goal for participation: Understanding discharge & Medication compliance. Goal orientation: community    Group therapy participation: active    Therapeutic interventions reviewed and discussed: Understanding the discharge process and the importance of Medication compliance.     Impression of participation: Active and involved with participation in group

## 2017-05-29 NOTE — PROGRESS NOTES
Problem: Suicide/Homicide (Adult/Pediatric)  Goal: *STG: Remains safe in hospital  Patient will not verbalize any harm to self while in the hospital.   Outcome: Progressing Towards Goal  Patient has remained free of harm to self and others while in facility. Goal: *STG/LTG: Complies with medication therapy  Patient will be compliant with medications while in hospital.   Outcome: Progressing Towards Goal  Patient has remained compliant with prescribed medication. Comments:   Patient requires verbal redirection for impulsive and disruptive behaviors at times within milieu. Patient was apologetic for attempting to rip cabinet doors off last evening. Patient advised writer that she is \"going to behave and not get into trouble, so I can go home ok\". Patient has showered and completed hygiene care with no assistance needed with patient shouting at times \"hey I need ou to help me, hey, hey\". Patient at times is oppositional when redirected however after brief timeout patient has been able to remain in control of self. Will continue to monitor for safety with support as needed throughout treatments regimen.

## 2017-05-29 NOTE — BH NOTES
Psychiatry progress note    Chart reviewed and patient interviewed. Patient documented as requiring redirection over past 24 hours. Patient stated she was given a time out for taking something yesterday. Said her meds are working and she feels calmer. Says she can't sleep due to bad dreams. Patient reported mood swings are \"norbert crazy a little\". Says she hears voices but can't understand them.     On exam, childlike and euthymic. Brief answers and vague thought process. No SI no HI . +AH no VH. Insight and judgment fair.       Meds - Trileptal 300 BID Geodon 80 BID; Vyvanse 30 qDay.     Assessment - Bipolar I disorder, most recent episode manic, Attention deficit hyperactivity disorder, combined type, Mild intellectual disability. Patient has a confusing mixture of jana/disinhibition/childlike behavior. Not optimally controlled still with manic, disinhibited behaviors. Plan is to increase Trileptal to 600 BID.

## 2017-05-29 NOTE — BH NOTES
GROUP THERAPY PROGRESS NOTE    Marloneneida Jacinto is not participating in [place]. Pt refused groups with encouragement from staff.

## 2017-05-29 NOTE — BH NOTES
Psychiatry progress note      Chart reviewed, patient interviewed. Case discussed with nursing staff. Nursing reports behavior seems related to intellectual disability. Patient complained she got angry last night and was cussing. Patient answered \"I don't know\" to much questions. Says she feels her mood swings are worse, gets angry quickly and wants a med change. Patient reports her mood is sometimes high and low. Patient complained a sleeping pill is making her dizzy. On exam, childlike and euthymic. Brief answers. No SI, HI or AVH. Insight and judgment fair.       Meds - Topamax 50 qDay, 100 qHS; Geodon 40 qDay, 120 qHS; Vyvanse 30 qDay.     Assessment - Bipolar I disorder, most recent episode manic, Attention deficit hyperactivity disorder, combined type, Mild intellectual disability. Patient has a confusing mixture of jana/disinhibition/childlike behavior. Not optimally controlled. Plan is to D/C Topamax, start Trileptal 300 BID and change Geodon to 80 BID. Will D/C Trazodone due to patient complaints about dizziness.

## 2017-05-29 NOTE — BH NOTES
GROUP THERAPY PROGRESS NOTE    Christal Bell is participating in Recre[facility]. Group time: 50 minutes    Personal goal for participation: Health and Socialization    Goal orientation: relaxation    Group therapy participation: active and minimal    Therapeutic interventions reviewed and discussed: skill building, community development, and goal acquisition. Impression of participation: Patient was well behaved and refrained from disruptive behavior. Patient was friendly and social. Patient contributed positively contributed to the group setting.

## 2017-05-29 NOTE — BH NOTES
GROUP THERAPY PROGRESS NOTE    Derek Lance is participating in [place].      Group time: 20 min    Personal goal for participation: Understanding discharge process    Goal orientation: personal    Group therapy participation: active    Therapeutic interventions reviewed and discussed: Court/Discharge process    Impression of participation: Pt actively participated in group

## 2017-05-29 NOTE — BH NOTES
Patient was redirected by staff several times from making inappropriate comments, she was asked to go her room , she made an attempt to take off the cabinet door,  She was redirected to her room,  she complied and took her medication. Staff will continue monitor her for health and safety.

## 2017-05-30 PROBLEM — F90.2 ATTENTION DEFICIT HYPERACTIVITY DISORDER (ADHD), COMBINED TYPE: Status: ACTIVE | Noted: 2017-05-30

## 2017-05-30 PROCEDURE — 65220000003 HC RM SEMIPRIVATE PSYCH

## 2017-05-30 PROCEDURE — 74011250637 HC RX REV CODE- 250/637: Performed by: PSYCHIATRY & NEUROLOGY

## 2017-05-30 RX ADMIN — OXCARBAZEPINE 600 MG: 300 TABLET, FILM COATED ORAL at 20:23

## 2017-05-30 RX ADMIN — ZIPRASIDONE HCL 80 MG: 80 CAPSULE ORAL at 08:22

## 2017-05-30 RX ADMIN — POLYETHYLENE GLYCOL 3350 17 G: 17 POWDER, FOR SOLUTION ORAL at 09:25

## 2017-05-30 RX ADMIN — CEPHALEXIN 500 MG: 250 CAPSULE ORAL at 20:23

## 2017-05-30 RX ADMIN — LISDEXAMFETAMINE DIMESYLATE 30 MG: 30 CAPSULE ORAL at 10:15

## 2017-05-30 RX ADMIN — ZIPRASIDONE HCL 80 MG: 80 CAPSULE ORAL at 20:23

## 2017-05-30 RX ADMIN — OXCARBAZEPINE 600 MG: 300 TABLET, FILM COATED ORAL at 08:22

## 2017-05-30 RX ADMIN — CEPHALEXIN 500 MG: 250 CAPSULE ORAL at 08:22

## 2017-05-30 RX ADMIN — GUANFACINE HYDROCHLORIDE 2 MG: 1 TABLET ORAL at 16:39

## 2017-05-30 NOTE — BSMART NOTE
SW COLLATERAL: The SW called and left a message with the REACH Coordinator MsKunal Awilda Saldaña ([phone]) to provide a disposition/update on the patient.

## 2017-05-30 NOTE — BH NOTES
Patient is cooperative during the shift, patient is very intrusive, and attention seeking, staff does have to redirect patient several times due to patient behaviors, patient denies thoughts of suicide, patient denies delusions, patient denies hallucinations will continue to monitor.

## 2017-05-30 NOTE — PROGRESS NOTES
Problem: Falls - Risk of  Goal: *Absence of falls  Patient will not experience falls while in hospital.   Outcome: Progressing Towards Goal  No falls     Problem: Suicide/Homicide (Adult/Pediatric)  Goal: *STG: Remains safe in hospital  Patient will not verbalize any harm to self while in the hospital.   Outcome: Progressing Towards Goal  No attempts to harm self or others   Goal: *STG: Attends activities and groups  Patient will attend group activities while in the hospital.   Outcome: Progressing Towards Goal  Attending groups   Goal: *STG/LTG: Complies with medication therapy  Patient will be compliant with medications while in hospital.   Outcome: Progressing Towards Goal  Compliant     Comments:   Pr is frequently at the desk loud and childlike. She is compliant with medications and attending groups. Pt was upset on the phone this morning but was able to calm down on her own. She spoke with the doctor and  this morning. Pt came to the desk to report that she is being discharged tomorrow. She also told nurses she will receive services to transition to live on her own. Pt states she will go home and behave for her parents. She denies hallucinations and any thoughts of harming self or others.

## 2017-05-30 NOTE — BSMART NOTE
OCCUPATIONAL THERAPY PROGRESS NOTE  Group Time:  1300  Attendance: The patient attended 3/4 of group. Participation:  The patient participated with minimal elaboration in the activity. Attention:  The patient needed frequent redirection to activity. Interaction:  The patient acknowledges others or responds to questions,  with no spontaneous interaction. .Talked about anger issues (concrete). Sat with head on table much of group. Minimal awareness of other, talking over peer at times (without awareness).

## 2017-05-30 NOTE — BSMART NOTE
ART THERAPY GROUP PROGRESS NOTE    PATIENT SCHEDULED FOR GROUP AT: 10:30    ATTENDANCE: Full    PARTICIPATION LEVEL: Needs only minimal encouragement    ATTENTION LEVEL : Needs occasional re-direction    FOCUS: Grounding    SYMBOLIC & THEMATIC CONTENT AS NOTED IN IMAGERY: She was calm and kept to herself. Cognitive deficits were noted as evidenced by interaction with group members, concrete thought process,  and associations had a child-like quality.

## 2017-05-30 NOTE — BH NOTES
GROUP THERAPY PROGRESS NOTE    Zander Cabral is participating in [facility].      Group time: 30 minutes    Personal goal for participation: keep up with her treatment plan    Goal orientation: community    Group therapy participation: active    Therapeutic interventions reviewed and discussed: patient was encouraged by staff     Impression of participation: very talkative

## 2017-05-30 NOTE — BH NOTES
At approximately 2015, Pt came from her room into the day area and stated, \"something bit me\". This writer and another MHT noticed a red irritated area on her lower left leg. Pt asked what she was doing prior to. Pt stated, \"I was in my bed\". \"I have another bite on my back\". This writer noticed a red spot on the upper right quadrant of Pt's back. Pt room was checked for the source of the bite and was unfounded. Charge Nurse informed of the situation.

## 2017-05-30 NOTE — BSMART NOTE
SW ENCOUNTER: The SW and doctor met with the patient to assess progress and needs. The patient presented as responsive and amenable; denied current thoughts of self-harm, SI/HI, intent, AVH, non-compliance, concerns regarding her medications, health and safety. Staff spoke with patient regarding self-care, compliance with her prescribed medication regime and community/family support systems. The patient will be discharged tomorrow.

## 2017-05-30 NOTE — PROGRESS NOTES
[address]  Inpatient Progress Note     Date of Service: 05/30/17  Hospital Day: 5     Subjective/Interval History   05/30/17    Treatment Team Notes:  Notes reviewed and/or discussed and report that Meagan Damon is childlike and pleads for discharge. Patient interview: Meagan Damon was interviewed by this writer today. Pt says that her medication changes since hospitalization has been helpful. Pt denies any SI/HI/AVH today. Denies any side effects to medications. She is ready for discharge tomorrow. Objective     Vitals:    05/28/17 2002 05/29/17 0811 05/29/17 2015 05/30/17 0806   BP: 126/82 120/78 113/76 114/75   Pulse: 92 86 90 92   Resp: 18 20 17 20   Temp: 97.8 °F (36.6 °C) 98.6 °F (37 °C) 98.5 °F (36.9 °C) 97.1 °F (36.2 °C)   SpO2:       Weight:       Height:           Mental Status Examination     Appearance/Hygiene  female   Attitude/Behavior/Social Relatedness Intrusive but cooperative   Musculoskeletal Gait/Station: appropriate  Tone (flaccid, cogwheeling, spastic): appropriate  Psychomotor (hyperkinetic, hypokinetic): appropriate   Involuntary movements (tics, dyskinesias, akathisa, stereotypies): none   Speech   Loud with articulation errors. Mood   euthymic   Affect    stable   Thought Process Linear and goal directed   Thought Content and Perceptual Disturbances Denies self-injurious behavior (SIB), suicidal ideation (SI), aggressive behavior or homicidal ideation (HI)    Denies auditory and visual hallucinations   Sensorium and Cognition  AOx4, attention intact, memory intact, language use appropriate, and fund of knowledge age appropriate   Insight  fair   Judgment fair        Assessment/Plan      Psychiatric Diagnoses:   1. Bipolar disorder, mre manic  2. ADHD-CT  3.  Mild ID    Medical Diagnoses: DM, UTI    Psychosocial and contextual factors: non complinace    Level of impairment/disability: severe Gracelyn Douse is a 22 y.o. who is currently stabilizing. Will discharge tomorrow. .      1.  Continue current regimen. 2. Checking fasting labs tomorrow morning  2. Reviewed instructions, risks, benefits and side effects of medications  3. Disposition/Discharge Date: Tomorrow.     Haresh Main MD  [facility]  Psychiatry

## 2017-05-30 NOTE — BH NOTES
Patient stated she was having anxiety, and that both of her ears were hurting patient given Vistaril for anxiety, and patient given Motrin for ear pain will continue to monitor.

## 2017-05-30 NOTE — BSMART NOTE
GROUP THERAPY PROGRESS NOTE    Daron Corbin is participating in Goals Group and Community. Group time: 30 minutes    Personal goal for participation: go home     Goal orientation: community    Group therapy participation: active    Therapeutic interventions reviewed and discussed:     Impression of participation: pt attended the community group and interacted with peers and staffs.  Pt stated in group that she will behave today in order to go home tomorrow

## 2017-05-30 NOTE — BH NOTES
I observed the patient left lower leg patient did have a slight redden irritation and also a slight reddened irritation was noted on the patient back patient, patient linen was observed and room was observed no insects or other rodents noted will continue to monitor.

## 2017-05-31 VITALS
RESPIRATION RATE: 16 BRPM | HEART RATE: 72 BPM | WEIGHT: 170 LBS | OXYGEN SATURATION: 99 % | TEMPERATURE: 97.2 F | DIASTOLIC BLOOD PRESSURE: 71 MMHG | SYSTOLIC BLOOD PRESSURE: 127 MMHG | HEIGHT: 66 IN | BODY MASS INDEX: 27.32 KG/M2

## 2017-05-31 LAB
ANION GAP BLD CALC-SCNC: 5 MMOL/L (ref 3–18)
BUN SERPL-MCNC: 12 MG/DL (ref 7–18)
BUN/CREAT SERPL: 16 (ref 12–20)
CALCIUM SERPL-MCNC: 9.2 MG/DL (ref 8.5–10.1)
CHLORIDE SERPL-SCNC: 108 MMOL/L (ref 100–108)
CHOLEST SERPL-MCNC: 164 MG/DL
CO2 SERPL-SCNC: 27 MMOL/L (ref 21–32)
CREAT SERPL-MCNC: 0.75 MG/DL (ref 0.6–1.3)
EST. AVERAGE GLUCOSE BLD GHB EST-MCNC: 114 MG/DL
GLUCOSE SERPL-MCNC: 91 MG/DL (ref 74–99)
HBA1C MFR BLD: 5.6 % (ref 4.2–5.6)
HDLC SERPL-MCNC: 33 MG/DL (ref 40–60)
HDLC SERPL: 5 {RATIO} (ref 0–5)
LDLC SERPL CALC-MCNC: 103.4 MG/DL (ref 0–100)
LIPID PROFILE,FLP: ABNORMAL
POTASSIUM SERPL-SCNC: 4.5 MMOL/L (ref 3.5–5.5)
SODIUM SERPL-SCNC: 140 MMOL/L (ref 136–145)
TRIGL SERPL-MCNC: 138 MG/DL (ref ?–150)
VLDLC SERPL CALC-MCNC: 27.6 MG/DL

## 2017-05-31 PROCEDURE — 36415 COLL VENOUS BLD VENIPUNCTURE: CPT | Performed by: PSYCHIATRY & NEUROLOGY

## 2017-05-31 PROCEDURE — 80048 BASIC METABOLIC PNL TOTAL CA: CPT | Performed by: PSYCHIATRY & NEUROLOGY

## 2017-05-31 PROCEDURE — 74011250637 HC RX REV CODE- 250/637: Performed by: PSYCHIATRY & NEUROLOGY

## 2017-05-31 PROCEDURE — 80061 LIPID PANEL: CPT | Performed by: PSYCHIATRY & NEUROLOGY

## 2017-05-31 PROCEDURE — 83036 HEMOGLOBIN GLYCOSYLATED A1C: CPT | Performed by: PSYCHIATRY & NEUROLOGY

## 2017-05-31 RX ORDER — PRAVASTATIN SODIUM 40 MG/1
20 TABLET ORAL
Status: SHIPPED | COMMUNITY
Start: 2017-05-31 | End: 2017-09-18 | Stop reason: SDUPTHER

## 2017-05-31 RX ORDER — CEPHALEXIN 500 MG/1
500 CAPSULE ORAL 2 TIMES DAILY
Qty: 1 CAP | Refills: 0 | Status: SHIPPED | OUTPATIENT
Start: 2017-05-31 | End: 2017-08-12

## 2017-05-31 RX ORDER — OXCARBAZEPINE 600 MG/1
600 TABLET, FILM COATED ORAL 2 TIMES DAILY
Qty: 60 TAB | Refills: 0 | Status: SHIPPED | OUTPATIENT
Start: 2017-05-31 | End: 2019-09-16

## 2017-05-31 RX ORDER — ZIPRASIDONE HYDROCHLORIDE 80 MG/1
80 CAPSULE ORAL 2 TIMES DAILY WITH MEALS
Qty: 60 CAP | Refills: 0 | Status: SHIPPED | OUTPATIENT
Start: 2017-05-31 | End: 2017-09-18

## 2017-05-31 RX ORDER — GUANFACINE HYDROCHLORIDE 2 MG/1
2 TABLET ORAL
Qty: 30 TAB | Refills: 0 | Status: SHIPPED | OUTPATIENT
Start: 2017-05-31 | End: 2017-08-12

## 2017-05-31 RX ADMIN — LISDEXAMFETAMINE DIMESYLATE 30 MG: 30 CAPSULE ORAL at 09:32

## 2017-05-31 RX ADMIN — OXCARBAZEPINE 600 MG: 300 TABLET, FILM COATED ORAL at 08:00

## 2017-05-31 RX ADMIN — CEPHALEXIN 500 MG: 250 CAPSULE ORAL at 08:00

## 2017-05-31 RX ADMIN — ZIPRASIDONE HCL 80 MG: 80 CAPSULE ORAL at 08:01

## 2017-05-31 NOTE — BSMART NOTE
SW ENCOUNTER: The SW and doctor met with the patient to assess progress and to discuss discharge plans. The patient presented as anxious to go home, alert, responsive and amenable. She denied current thoughts of self-harm, SI/HI, intent, AVH, and concerns regarding her health, medications and safety. The patient expressed feeling good but tired today. Staff discussed the importance of compliance with her prescribed medication regime and active participation in her treatment as directed upon discharge. She was encouraged to utilize healthy communication, coping and anger management strategies. The patient will be discharged today.

## 2017-05-31 NOTE — BSMART NOTE
SW COLLATERAL: The SW spoke with the patient's father regarding her discharge. He stated that he was disappointed that he had not been consulted on her care by a physician due to her intellectual disability. The SW validated and apologized; explained that there was a change of doctors and assured him that the psychiatrist would call regarding her care and medications. The SW contacted the [facility] Coordinator (Ms. Felisha Dumont [phone]) regarding the patient's discharge and progress. She stated that someone would visit the home tomorrow for an intake interview. The SW contacted the patient's counselor with [employer] to verify the agency address which is [facility]; informed her that the patient was being discharged for the hospital today.

## 2017-05-31 NOTE — PROGRESS NOTES
[address]  Inpatient Progress Note     Date of Service: 05/31/17  Hospital Day: 6     Subjective/Interval History   05/31/17    Treatment Team Notes:  Notes reviewed and/or discussed and report that Shea Gusman is childlike but non-aggressive. She is compliant on her medications. Patient interview: Shea Gusman was interviewed by this writer today. Pt says that she is better. Denies any behavioral problems. Says that her mood is stable. No SI/AVH. Pt wants provider to call dad to make sure that her medications are explained. Objective     Vitals:    05/30/17 0806 05/30/17 1640 05/30/17 1851 05/31/17 0745   BP: 114/75 106/74 112/78 127/71   Pulse: 92 100 89 72   Resp: 20  18 16   Temp: 97.1 °F (36.2 °C)  97.5 °F (36.4 °C) 97.2 °F (36.2 °C)   SpO2:       Weight:       Height:           Mental Status Examination     Appearance/Hygiene  female   Attitude/Behavior/Social Relatedness Intrusive but cooperative   Musculoskeletal Gait/Station: appropriate  Psychomotor (hyperkinetic, hypokinetic): appropriate   Involuntary movements (tics, dyskinesias, akathisa, stereotypies): none   Speech   Loud with articulation errors. Mood   euthymic   Affect    stable   Thought Process Linear and goal directed   Thought Content and Perceptual Disturbances Denies self-injurious behavior (SIB), suicidal ideation (SI), aggressive behavior or homicidal ideation (HI)    Denies auditory and visual hallucinations   Sensorium and Cognition  AOx4, attention intact, memory intact, language use appropriate, and fund of knowledge age appropriate   Insight  fair   Judgment fair        Assessment/Plan      Psychiatric Diagnoses:   1. Bipolar disorder, mre manic  2. ADHD-CT  3. Mild ID    Medical Diagnoses: DM, UTI    Psychosocial and contextual factors: non complinace    Level of impairment/disability: moderate    Shea Gusman is a 22 y.o. who is currently stabilizing. Pt is at baseline. No SI or active hallucinations/delusions. 1.  Continue current regimen: Geodone 80mg BID with meals, Trileptal 600mg BID, Vvyanse 30mg, Tenex 2mg nightly and Keflex 500mg BID (last dose this evening). 2. fasting labs reviewed. 3.  Left voicemail for Mr. Perez Mooring to return call. 4.  Reviewed instructions, risks, benefits and side effects of medications  5. Disposition/Discharge Date: Tomorrow.     Jorje Zarate MD [facility]. [facility]  [facility]iatry

## 2017-05-31 NOTE — BH NOTES
GROUP THERAPY PROGRESS NOTE    Thalia Ponce is participating in Recreational Therapy.      Group time: 30 minutes    Goal orientation: social    Group therapy participation: active    Therapeutic interventions reviewed and discussed: socializing as an effective coping mechanism    Impression of participation: active

## 2017-05-31 NOTE — BH NOTES
Discharge instructions reviewed with patient and father and was given a copy of discharge instructions. Patient was discharged with belongings to her father at 81. Patient has numbers to remember card.

## 2017-05-31 NOTE — BH NOTES
Pt continues to display erratic behavior. Pt is very intrusive to staff and other peers personal space while staff is talking to another peer. Pt during this shift enjoyed a visit from family members and is very eager to possibly going home tomorrow. Pt only had to be redirected on two occasions for loud noise disturbing the day room. Staff will continue to monitor for safety and well being.

## 2017-05-31 NOTE — DISCHARGE INSTRUCTIONS
BEHAVIORAL HEALTH NURSING DISCHARGE NOTE      The following personal items collected during your admission are returned to you:   Dental Appliance: Dental Appliances:   Vision:    Hearing Aid:    Jewelry: Jewelry:   Clothing: Clothing:   Other Valuables: Other Valuables:   Valuables sent to safe: Personal Items Sent to Safe:         PATIENT INSTRUCTIONS:    Diet: Regular    The discharge information has been reviewed with the patient. The patient verbalized understanding. Patient armband removed and shredded.

## 2017-06-02 NOTE — DISCHARGE SUMMARY
[address] Bobby Broderick SUMMARY    Name:  Cherylene Lamprey  MR#:  [id number]  :  1995  Account #:  [de-identified]  Date of Adm:  2017  Date of Discharge:  2017      PSYCHIATRIC DIAGNOSES  1. Bipolar disorder. 2. Attention deficit hyperactivity disorder, combined type. 3. Mild intellectual disability. MEDICAL DIAGNOSES  1. Diabetes. 2. Urinary tract infection. PSYCHOSOCIAL AND CONTEXTUAL FACTORS: Noncompliance  with outpatient treatment. LEVEL OF IMPAIRMENT OR DISABILITY: Moderate. HOSPITAL COURSE: The patient is a 55-year-old  female  who presented under temporary detainment order at the request of her  father for a 3-week period of increased agitated and unusual  behaviors. For more information, please refer to the intake admit note. Treatment team assessed the patient's needs daily during her  hospitalization. The patient did not require any p.r.n. medications for  hallucinations or agitation. She was given 1 dose of hydroxyzine 50 mg  tablet for anxiety. Staff noted that she was initially disorganized and  intrusive. Her relate-ability improved with medication adjustments. The patient's home regimen of Celexa was discontinued due to her  current de-stabled mood. Her home Geodon dosage was rescheduled  to 80 mg p.o. b.i.d. She was initiated on Trileptal, which was increased  to 600 mg twice a day for additional mood stabilization. Likewise, she  was maintained on her home dosage of Vyvanse and Intuniv. Lastly,  due to concerns of a urinary tract infection, she completed a course of  Keflex. On the day of discharge the patient reported compliance with her  medicines. She denied any side effects. She had stated that the  medicines were helping maintain her behaviors and also improve her  mood.     Discharge Medication List as of 2017 11:06 AM      START taking these medications    Details   cephALEXin (KEFLEX) 500 mg capsule Take 1 Cap by mouth two (2) times a day. Indications: infection, Print, Disp-1 Cap, R-0      guanFACINE IR (TENEX) 2 mg IR tablet Take 1 Tab by mouth daily (with dinner). Indications: ATTENTION-DEFICIT HYPERACTIVITY DISORDER, Print, Disp-30 Tab, R-0      OXcarbazepine (TRILEPTAL) 600 mg tablet Take 1 Tab by mouth two (2) times a day. Indications: mood stabilization, Print, Disp-60 Tab, R-0         CONTINUE these medications which have CHANGED    Details   pravastatin (PRAVACHOL) 40 mg tablet Take 1 Tab by mouth nightly. Indications: hypercholesterolemia, Historical Med      lisdexamfetamine (VYVANSE) 30 mg capsule Take 1 Cap (30 mg total) by mouth dailyIndications: ATTENTION-DEFICIT HYPERACTIVITY DISORDER. Max Daily Amount: 30 mg, Print, Disp-30 Cap, R-0      ziprasidone (GEODON) 80 mg capsule Take 1 Cap by mouth two (2) times daily (with meals). Indications: mood stabilization, Print, Disp-60 Cap, R-0         STOP taking these medications       topiramate (TOPAMAX) 100 mg tablet Comments:   Reason for Stopping:         citalopram (CELEXA) 20 mg tablet Comments:   Reason for Stopping:         Guanfacine (INTUNIV) 4 mg Tb24 Comments:   Reason for Stopping:                 DISPOSITION: The patient was discharged home to her father. She  will continue with [facility] for additional services. She also has  appointments with Dr. Prudence Lennox on 06/14/2017 at 3 p.m. She can  continue seeing her therapist at [facility], Ms. Hernandez, on 07/19/2017 at 10:15 a.m. Of note, the patient has  intensive in home with [facility] as well. DISCHARGE MENTAL STATUS EXAMINATION: The patient is a 22  year-old  female who wears glasses. She is intrusive but  cooperative. Her speech is loud with articulation errors. Her mood and  affect are stable. Thought process is goal directed. Thought content is  absent for any self-injurious behaviors, suicidal ideations, aggressive  behaviors, or homicidal ideations.  Her insight and judgment are fair.         MD Quyen Rodriguez  D:  06/01/2017   14:29  T:  06/01/2017   22:56  Job #:  [id number]

## 2017-07-20 ENCOUNTER — HOSPITAL ENCOUNTER (EMERGENCY)
Age: 22
Discharge: HOME | End: 2017-07-20
Attending: EMERGENCY MEDICINE
Payer: MEDICAID

## 2017-07-20 VITALS
BODY MASS INDEX: 29.63 KG/M2 | TEMPERATURE: 97.9 F | SYSTOLIC BLOOD PRESSURE: 128 MMHG | RESPIRATION RATE: 18 BRPM | HEART RATE: 87 BPM | WEIGHT: 183.6 LBS | DIASTOLIC BLOOD PRESSURE: 93 MMHG | OXYGEN SATURATION: 99 %

## 2017-07-20 DIAGNOSIS — R45.851 SUICIDAL IDEATION: Primary | ICD-10-CM

## 2017-07-20 LAB
ALBUMIN SERPL BCP-MCNC: 3.4 G/DL (ref 3.4–5)
ALBUMIN/GLOB SERPL: 0.8 {RATIO} (ref 0.8–1.7)
ALP SERPL-CCNC: 154 U/L (ref 45–117)
ALT SERPL-CCNC: 34 U/L (ref 13–56)
AMPHET UR QL SCN: NEGATIVE
ANION GAP BLD CALC-SCNC: 5 MMOL/L (ref 3–18)
AST SERPL W P-5'-P-CCNC: 19 U/L (ref 15–37)
BARBITURATES UR QL SCN: NEGATIVE
BASOPHILS # BLD AUTO: 0.1 K/UL (ref 0–0.1)
BASOPHILS # BLD: 1 % (ref 0–2)
BENZODIAZ UR QL: NEGATIVE
BILIRUB SERPL-MCNC: 0.1 MG/DL (ref 0.2–1)
BUN SERPL-MCNC: 15 MG/DL (ref 7–18)
BUN/CREAT SERPL: 19 (ref 12–20)
CALCIUM SERPL-MCNC: 8.9 MG/DL (ref 8.5–10.1)
CANNABINOIDS UR QL SCN: NEGATIVE
CHLORIDE SERPL-SCNC: 108 MMOL/L (ref 100–108)
CO2 SERPL-SCNC: 29 MMOL/L (ref 21–32)
COCAINE UR QL SCN: NEGATIVE
CREAT SERPL-MCNC: 0.81 MG/DL (ref 0.6–1.3)
DIFFERENTIAL METHOD BLD: ABNORMAL
EOSINOPHIL # BLD: 0.2 K/UL (ref 0–0.4)
EOSINOPHIL NFR BLD: 3 % (ref 0–5)
ERYTHROCYTE [DISTWIDTH] IN BLOOD BY AUTOMATED COUNT: 12.6 % (ref 11.6–14.5)
ETHANOL SERPL-MCNC: <3 MG/DL (ref 0–3)
GLOBULIN SER CALC-MCNC: 4.1 G/DL (ref 2–4)
GLUCOSE SERPL-MCNC: 96 MG/DL (ref 74–99)
HCT VFR BLD AUTO: 43.6 % (ref 35–45)
HDSCOM,HDSCOM: NORMAL
HGB BLD-MCNC: 14.6 G/DL (ref 12–16)
LYMPHOCYTES # BLD AUTO: 27 % (ref 21–52)
LYMPHOCYTES # BLD: 2.2 K/UL (ref 0.9–3.6)
MCH RBC QN AUTO: 31.1 PG (ref 24–34)
MCHC RBC AUTO-ENTMCNC: 33.5 G/DL (ref 31–37)
MCV RBC AUTO: 93 FL (ref 74–97)
METHADONE UR QL: NEGATIVE
MONOCYTES # BLD: 0.5 K/UL (ref 0.05–1.2)
MONOCYTES NFR BLD AUTO: 6 % (ref 3–10)
NEUTS SEG # BLD: 5 K/UL (ref 1.8–8)
NEUTS SEG NFR BLD AUTO: 63 % (ref 40–73)
OPIATES UR QL: NEGATIVE
PCP UR QL: NEGATIVE
PLATELET # BLD AUTO: 284 K/UL (ref 135–420)
PMV BLD AUTO: 12.2 FL (ref 9.2–11.8)
POTASSIUM SERPL-SCNC: 4.2 MMOL/L (ref 3.5–5.5)
PROT SERPL-MCNC: 7.5 G/DL (ref 6.4–8.2)
RBC # BLD AUTO: 4.69 M/UL (ref 4.2–5.3)
SODIUM SERPL-SCNC: 142 MMOL/L (ref 136–145)
WBC # BLD AUTO: 7.9 K/UL (ref 4.6–13.2)

## 2017-07-20 PROCEDURE — 85025 COMPLETE CBC W/AUTO DIFF WBC: CPT | Performed by: EMERGENCY MEDICINE

## 2017-07-20 PROCEDURE — 80307 DRUG TEST PRSMV CHEM ANLYZR: CPT | Performed by: EMERGENCY MEDICINE

## 2017-07-20 PROCEDURE — 80053 COMPREHEN METABOLIC PANEL: CPT | Performed by: EMERGENCY MEDICINE

## 2017-07-20 PROCEDURE — 99283 EMERGENCY DEPT VISIT LOW MDM: CPT

## 2017-07-20 NOTE — ED PROVIDER NOTES
HPI Comments: Salo Rodriguez 22-year-old female past medical history of ADHD, borderline personality disorder and bipolar mental retardation who presents with not sleeping. About an hour of sleep at night in addition patient has been staying at a group home and expressing suicidal ideation by threatening to jump on a window. No other aggravating or alleviating factors. No other associated symptoms. This document was created with voice recognition technology and unrecognized errors may be present. Patient is a 22 y.o. female presenting with mental health disorder. Mental Health Problem          Past Medical History:   Diagnosis Date    ADHD (attention deficit hyperactivity disorder)     Bipolar disorder ([facility]     Borderline personality disorder     Mild mental retardation     PMS (premenstrual syndrome)     Thromboembolus ([facility] 2013    PE       No past surgical history on file. No family history on file. Social History     Social History    Marital status: SINGLE     Spouse name: N/A    Number of children: N/A    Years of education: N/A     Occupational History    Not on file. Social History Main Topics    Smoking status: Never Smoker    Smokeless tobacco: Not on file    Alcohol use Yes    Drug use: No    Sexual activity: No     Other Topics Concern    Not on file     Social History Narrative         ALLERGIES: Review of patient's allergies indicates no known allergies. Review of Systems   All other systems reviewed and are negative. Vitals:    07/20/17 1741   BP: 122/85   Pulse: 96   Resp: 18   Temp: 98.2 °F (36.8 °C)   SpO2: 96%   Weight: 83.3 kg (183 lb 9.6 oz)            Physical Exam   Constitutional: She is oriented to person, place, and time. She appears well-developed. HENT:   Head: Normocephalic and atraumatic. Eyes: EOM are normal. Pupils are equal, round, and reactive to light. Neck: Normal range of motion. Neck supple.    Cardiovascular: Normal rate, regular rhythm and normal heart sounds. Exam reveals no friction rub. No murmur heard. Pulmonary/Chest: Effort normal and breath sounds normal. No respiratory distress. She has no wheezes. Abdominal: Soft. She exhibits no distension. There is no tenderness. There is no rebound and no guarding. Musculoskeletal: Normal range of motion. Neurological: She is alert and oriented to person, place, and time. Skin: Skin is warm and dry. MDM  Number of Diagnoses or Management Options  Diagnosis management comments:  21-year-old with suicidal ideation. Discussed cased crisis. At This point I do not see anything preventive preventing further psychiatric evaluation.   Patient was turned over to Dr. Kiki Choi 7 PM pending crisis eval    ED Course       Procedures

## 2017-07-20 NOTE — ED NOTES
Bedside shift change report given to [facility] (oncoming nurse) by Jese Calle RN (offgoing nurse). Report included the following information SBAR.

## 2017-07-20 NOTE — ED TRIAGE NOTES
Patient to ED with group home staff who report patient has been having outburst and needs medication adjustment, also threatening SI. Patient ambulatory on arrival. NKDA.

## 2017-07-21 NOTE — ED NOTES
I have reviewed discharge instructions with the patients guardian. The guardian verbalized understanding. Patient armband removed and shredded. Pt is ambulatory with no acute distress noted at this time, the patient is alert, oriented and stable at time of discharge. Vital Signs stable. Patient is being discharged without prescription at this time.

## 2017-07-21 NOTE — ED NOTES
7:05 PM :Pt care assumed from Dr. Maryuri Chambers , ED provider. Pt complaint(s), current treatment plan, progression and available diagnostic results have been discussed thoroughly. Rounding occurred: Yes  Intended Disposition: To be determined  Pending diagnostic reports and/or labs (please list): Crisis      8:45 PM, 7/20/2017   Consult:  Discussed care with Rodger Lemons. Standard discussion; including history of patients chief complaint, available diagnostic results, and treatment course. States that pt is suitable for outpatient management and contracted for safety and instructed to return for any new or worsening sx      Scribe Attestation:   I, Herman Nielsen, am scribing for and in the presence of Radha Huang MD on this day 07/20/17 at 8:45 PM   gonzalez France    Provider Attestation:  I personally performed the services described in the documentation, reviewed the documentation, as recorded by the scribe in my presence, and it accurately and completely records my words and actions.   Radha Huang MD. 7:05 PM      Signed by: Gonzalez France, 7:05 PM

## 2017-07-21 NOTE — BSMART NOTE
Comprehensive Assessment Form Part 1    Section I - Disposition    Patient denies current thoughts of wanting to harm self/others. No auditory or visual hallucinations. I explained to the group home workers that the doctors here in the ED do not adjust medications because they can't follow them outpatient. She has an appointment already scheduled with Dr. Vanessa Spaulding for next Thursday. Section II - Integrated Summary    This is a 22year old female with a history of Bipolar Disorder, ADHD and Mild Intellectual Disability who was brought to the ED with her twin sister from a group home for a crisis evaluation. According to the , she was sent here by her Psychiatrist, Dr. Vanessa Spaulding to get her medications adjusted because Dr. Vanessa Spaulding couldn't see her until next Thursday. Patient was loud at times. She said her medications are not working. She said \"sometimes my meds make me crazy. Sometimes they make me feel suicidal.\" She also reports having difficulty sleeping. Two days ago she had thoughts of jumping out of the window. Last night she was thinking of cutting her wrist with a mirror. The  said her moods have been up and down but says a lot of her behavior is attention seeking. Patient states she likes the group home that she is in and wants to go back. She asked me if I was from [facility] and said she did not want me to place her elsewhere.                       Ho Keys RN

## 2017-07-21 NOTE — DISCHARGE INSTRUCTIONS
Suicidal Thoughts and Behavior: Care Instructions  Your Care Instructions  You have been seen by a doctor because you've had thoughts about killing yourself. Maybe you have tried to do it. This is much different from having fleeting thoughts of death, which many people have from time to time. Your doctor and support team will work hard to help keep you safe. Your team may include a , a , and a counselor. Most people who think about suicide don't want to die. They think suicide will end their problems and pain. People who consider suicide often feel hopeless, helpless, and worthless. These thoughts can make a person feel that there is no other choice. But you do have a choice. Help is always available. The doctor and staff members are taking you and your pain very seriously. It is important to remember that there are people who are willing and able to talk with you about your suicidal thoughts. Treatment and close follow-up care can help you feel better about life. Thoughts of hopelessness and suicide may come from being depressed. Depression is a medical condition. When you have depression, there may be problems with activity levels in certain parts of your brain. Chemicals in your brain called neurotransmitters may be out of balance. But depression can be treated. Treatment for depression includes counseling, medicines, and lifestyle changes. With treatment, you can feel better. Your doctor doesn't want you to hurt yourself. He or she may ask you to sign a \"no harm\" agreement or contract. This contract is your promise that you will not hurt yourself between now and your next visit. Be completely honest with your doctor if you feel that you can't sign it. You will get help. Follow-up care is a key part of your treatment and safety. Be sure to make and go to all appointments, and call your doctor if you are having problems.  It's also a good idea to know your test results and keep a list of the medicines you take. How can you care for yourself at home? · Talk to someone. Reach out to family members, friends, your doctor, or a counselor. Be open and honest with them about your thoughts and feelings. · Be safe with medicines. Take your medicines exactly as prescribed. Call your doctor if you think you are having a problem with your medicine. · Avoid illegal drugs and alcohol. · Attend all counseling sessions recommended by your doctor. · Have someone take away sharp or dangerous objects, guns, and drugs from your home. · Keep the numbers for these national suicide hotlines: 7-683-526-TALK (4-764.845.6955) and 4-545-KFIWDXU (2-657.797.9411). When should you call for help? Call 911 anytime you think you may need emergency care. For example, call if:  · You feel you cannot stop from hurting yourself or someone else. Call your doctor now or seek immediate medical care if:  · You have one or more warning signs of suicide. For example, call if:  ¨ You feel like giving away your possessions. ¨ You use illegal drugs or drink alcohol heavily. ¨ You talk or write about death. This may include writing suicide notes and talking about guns, knives, or pills. ¨ You start to spend a lot of time alone or spend more time alone than usual.  · You hear voices. · You start acting in an aggressive way that's not normal for you. Watch closely for changes in your health, and be sure to contact your doctor if you have any problems. Where can you learn more? Go to http://paty-jennifer.info/. Enter C462 in the search box to learn more about \"Suicidal Thoughts and Behavior: Care Instructions. \"  Current as of: July 26, 2016  Content Version: 11.3  © 8496-3718 [email or web address]. Care instructions adapted under license by [employer] (which disclaims liability or warranty for this information).  If you have questions about a medical condition or this instruction, always ask your healthcare professional. [place] [address] any warranty or liability for your use of this information.

## 2017-09-18 ENCOUNTER — OFFICE VISIT (OUTPATIENT)
Dept: FAMILY MEDICINE CLINIC | Age: 22
End: 2017-09-18

## 2017-09-18 VITALS
TEMPERATURE: 97.9 F | DIASTOLIC BLOOD PRESSURE: 90 MMHG | OXYGEN SATURATION: 98 % | BODY MASS INDEX: 32.68 KG/M2 | SYSTOLIC BLOOD PRESSURE: 130 MMHG | RESPIRATION RATE: 20 BRPM | HEIGHT: 64 IN | HEART RATE: 108 BPM | WEIGHT: 191.4 LBS

## 2017-09-18 DIAGNOSIS — L70.0 ACNE VULGARIS: ICD-10-CM

## 2017-09-18 DIAGNOSIS — E78.2 MIXED HYPERLIPIDEMIA: ICD-10-CM

## 2017-09-18 DIAGNOSIS — Z11.1 TUBERCULIN SKIN TEST ENCOUNTER: ICD-10-CM

## 2017-09-18 DIAGNOSIS — Z23 ENCOUNTER FOR IMMUNIZATION: Primary | ICD-10-CM

## 2017-09-18 RX ORDER — CLINDAMYCIN AND BENZOYL PEROXIDE 10; 50 MG/G; MG/G
GEL TOPICAL 2 TIMES DAILY
Qty: 1 TUBE | Refills: 5 | Status: SHIPPED | OUTPATIENT
Start: 2017-09-18 | End: 2018-12-20 | Stop reason: SDUPTHER

## 2017-09-18 RX ORDER — PRAVASTATIN SODIUM 40 MG/1
20 TABLET ORAL
Qty: 90 TAB | Refills: 1 | Status: SHIPPED | OUTPATIENT
Start: 2017-09-18 | End: 2018-02-27 | Stop reason: SDUPTHER

## 2017-09-18 RX ORDER — HALOPERIDOL 5 MG/1
TABLET ORAL
Refills: 0 | COMMUNITY
Start: 2017-08-17 | End: 2019-09-16

## 2017-09-18 NOTE — PROGRESS NOTES
HISTORY OF PRESENT ILLNESS  Sunday Marli is a 22 y.o. female. Patient presents today with her care giver to establish care and needs refills. HPI  She is under psych for care. Review of Systems   Constitutional: Negative. HENT: Negative. Eyes: Negative. Respiratory: Negative. Cardiovascular: Negative. Gastrointestinal: Negative. Skin: Positive for rash (breaking out on face. ). Negative for itching. Visit Vitals    /90 (BP 1 Location: Right arm, BP Patient Position: Sitting)    Pulse (!) 108    Temp 97.9 °F (36.6 °C) (Oral)    Resp 20    Ht 5' 4\" (1.626 m)    Wt 191 lb 6.4 oz (86.8 kg)    LMP  (LMP Unknown)    SpO2 98%    BMI 32.85 kg/m2       Physical Exam   Constitutional: She appears well-developed. No distress. HENT:   Head: Normocephalic and atraumatic. Right Ear: External ear normal.   Left Ear: External ear normal.   Nose: Nose normal.   Mouth/Throat: Oropharynx is clear and moist. No oropharyngeal exudate. Eyes: EOM are normal. Pupils are equal, round, and reactive to light. Right eye exhibits no discharge. Left eye exhibits no discharge. Neck: Normal range of motion. Neck supple. No thyromegaly present. Cardiovascular: Normal rate, regular rhythm and normal heart sounds. No murmur heard. Pulmonary/Chest: Effort normal and breath sounds normal. No respiratory distress. She has no wheezes. She has no rales. Abdominal: Soft. Bowel sounds are normal. She exhibits no distension and no mass. There is no tenderness. There is no rebound and no guarding. Musculoskeletal: Normal range of motion. Lymphadenopathy:     She has no cervical adenopathy. Neurological: She is alert. No cranial nerve deficit. Skin: Lesion and rash noted. No abrasion, no burn, no petechiae and no purpura noted. Rash is papular and pustular. Rash is not nodular, not vesicular and not urticarial. There is erythema.   skin: forehead: open and closed comedoms.     ASSESSMENT and PLAN    ICD-10-CM ICD-9-CM    1. Encounter for immunization Z23 V03.89 INFLUENZA VIRUS VAC QUAD,SPLIT,PRESV FREE SYRINGE IM   2. Tuberculin skin test encounter Z11.1 V74.1 AMB POC TUBERCULOSIS, INTRADERMAL (SKIN TEST)   3. Mixed hyperlipidemia E78.2 272.2 pravastatin (PRAVACHOL) 40 mg tablet   4. Acne vulgaris L70.0 706.1 clindamycin-benzoyl peroxide (BENZACLIN) 1-5 % topical gel     PLAN:  I reviewed her medication list and refilled the medicines she needed. We discussed her acne and treatment options as well as skin care. I asked the care giver to return in January for a physical and lab work. Forms were completed and copied. RTC with any concerns sooner. Care giver was given after visit summary.

## 2017-09-18 NOTE — MR AVS SNAPSHOT
Visit Information Date & Time Provider Department Dept. Phone Encounter #  
 9/18/2017 12:00 PM Franco Sood NP [facility] [phone] Upcoming Health Maintenance Date Due Hepatitis A Peds Age 1-18 (1 of 2 - Standard Series) 12/30/1997 DTaP/Tdap/Td series (1 - Tdap) 12/30/2003 HPV AGE 9Y-26Y (1 of 3 - Female 3 Dose Series) 12/30/2007 INFLUENZA AGE 9 TO ADULT 8/1/2017 Allergies as of 9/18/2017  Review Complete On: 9/18/2017 By: Franco Sood NP Not on File Current Immunizations  Never Reviewed Name Date Influenza Vaccine (Quad) PF  Incomplete TB Skin Test (PPD) Intradermal  Incomplete Not reviewed this visit You Were Diagnosed With   
  
 Codes Comments Encounter for immunization    -  Primary ICD-10-CM: F46 ICD-9-CM: V03.89 Tuberculin skin test encounter     ICD-10-CM: Z11.1 ICD-9-CM: V74.1 Mixed hyperlipidemia     ICD-10-CM: E78.2 ICD-9-CM: 272.2 Acne vulgaris     ICD-10-CM: L70.0 ICD-9-CM: 706.1 Vitals BP Pulse Temp Resp Height(growth percentile) Weight(growth percentile) 130/90 (BP 1 Location: Right arm, BP Patient Position: Sitting) (!) 108 97.9 °F (36.6 °C) (Oral) 20 5' 4\" (1.626 m) 191 lb 6.4 oz (86.8 kg) LMP SpO2 BMI OB Status Smoking Status (LMP Unknown) 98% 32.85 kg/m2 Implant Never Smoker BMI and BSA Data Body Mass Index Body Surface Area  
 32.85 kg/m 2 1.98 m 2 Your Updated Medication List  
  
   
This list is accurate as of: 9/18/17 12:48 PM.  Always use your most recent med list.  
  
  
  
  
 clindamycin-benzoyl peroxide 1-5 % topical gel Commonly known as:  Sol Boom Apply  to affected area two (2) times a day. Apply to affected area after the skin has been cleansed and dried. docusate sodium 100 mg capsule Commonly known as:  Minnie Philip Take 100 mg by mouth two (2) times a day.  
  
 haloperidol 5 mg tablet Commonly known as:  HALDOL LORazepam 0.5 mg tablet Commonly known as:  ATIVAN Take 0.5 mg by mouth every eight (8) hours as needed for Anxiety. MIRALAX 17 gram/dose powder Generic drug:  polyethylene glycol Take 17 g by mouth daily. naproxen sodium 550 mg tablet Commonly known as:  Daisy Steens Take 550 mg by mouth two (2) times daily as needed. OXcarbaxepine 600 mg tablet Commonly known as:  TRILEPTAL Take 1 Tab by mouth two (2) times a day. Indications: mood stabilization  
  
 pravastatin 40 mg tablet Commonly known as:  PRAVACHOL Take 0.5 Tabs by mouth nightly. Indications: hypercholesterolemia SEROquel 50 mg tablet Generic drug:  QUEtiapine Take 50 mg by mouth nightly. TESSALON PERLES 100 mg capsule Generic drug:  benzonatate Take 100 mg by mouth three (3) times daily as needed for Cough. Prescriptions Printed Refills  
 pravastatin (PRAVACHOL) 40 mg tablet 1 Sig: Take 0.5 Tabs by mouth nightly. Indications: hypercholesterolemia Class: Print Route: Oral  
 clindamycin-benzoyl peroxide (BENZACLIN) 1-5 % topical gel 5 Sig: Apply  to affected area two (2) times a day. Apply to affected area after the skin has been cleansed and dried. Class: Print Route: Topical  
  
We Performed the Following AMB POC TUBERCULOSIS, INTRADERMAL (SKIN TEST) [92964 CPT(R)] INFLUENZA VIRUS VAC QUAD,SPLIT,PRESV FREE SYRINGE IM [id number] CPT(R)] Introducing [facility]! Alyssia Junior introduces [employer] patient portal. Now you can access parts of your medical record, email your doctor's office, and request medication refills online. 1. In your internet browser, go to [email or web address] 2. Click on the First Time User? Click Here link in the Sign In box. You will see the New Member Sign Up page. 3. Enter your [employer] Access Code exactly as it appears below.  You will not need to use this code after youve completed the sign-up process. If you do not sign up before the expiration date, you must request a new code. · [employer] Access Code: [email or web address] Expires: 12/17/2017 12:03 PM 
 
4. Enter the last four digits of your Social Security Number (xxxx) and Date of Birth (mm/dd/yyyy) as indicated and click Submit. You will be taken to the next sign-up page. 5. Create a [employer] ID. This will be your [employer] login ID and cannot be changed, so think of one that is secure and easy to remember. 6. Create a [employer] password. You can change your password at any time. 7. Enter your Password Reset Question and Answer. This can be used at a later time if you forget your password. 8. Enter your e-mail address. You will receive e-mail notification when new information is available in [address]. 9. Click Sign Up. You can now view and download portions of your medical record. 10. Click the Download Summary menu link to download a portable copy of your medical information. If you have questions, please visit the Frequently Asked Questions section of the [employer] website. Remember, [employer] is NOT to be used for urgent needs. For medical emergencies, dial 911. Now available from your iPhone and Android! Please provide this summary of care documentation to your next provider. Your primary care clinician is listed as Caio Reyes. If you have any questions after today's visit, please call [phone].

## 2017-09-18 NOTE — PROGRESS NOTES
1. Have you been to the ER, urgent care clinic since your last visit? Hospitalized since your last visit? Yes When: [facility] 08/2017    2. Have you seen or consulted any other health care providers outside of the [address] [place] [address] since your last visit? Include any pap smears or colon screening. No      Patient is here today with care giver who states the paperwork she received says patient is allergic to Birth control, but doesn't state a kind nor a reaction. PPD Placement note  Donna Everett, 22 y.o. female is here today for placement of PPD test  Reason for PPD test: facility  Pt taken PPD test before: no  Verified in allergy area and with patient that they are not allergic to the products PPD is made of (Phenol or Tween). No  Is patient taking any oral or IV steroid medication now or have they taken it in the last month? no  Has the patient ever received the BCG vaccine?: no  Has the patient been in recent contact with anyone known or suspected of having active TB disease?: no       Date of exposure (if applicable): na       Name of person they were exposed to (if applicable): na  Patient's Country of origin?: [place]  O: Alert and oriented in NAD. P:  PPD placed on 9/18/2017. Patient advised to return for reading within 48-72 hours.

## 2017-09-20 LAB
MM INDURATION POC: 0 MM (ref 0–5)
PPD POC: NEGATIVE NEGATIVE

## 2017-09-20 NOTE — PROGRESS NOTES
PPD Reading Note  PPD read and results entered in [facility]. Result: 0 mm induration.   Interpretation: negative  If test not read within 48-72 hours of initial placement, patient advised to repeat in other arm 1-3 weeks after this test.  Allergic reaction: no

## 2017-12-04 DIAGNOSIS — E78.2 MIXED HYPERLIPIDEMIA: Primary | ICD-10-CM

## 2017-12-05 LAB
ALBUMIN SERPL-MCNC: 4.1 G/DL (ref 3.5–5.5)
ALBUMIN/GLOB SERPL: 1.6 {RATIO} (ref 1.2–2.2)
ALP SERPL-CCNC: 142 IU/L (ref 39–117)
ALT SERPL-CCNC: 28 IU/L (ref 0–32)
AST SERPL-CCNC: 17 IU/L (ref 0–40)
BILIRUB SERPL-MCNC: <0.2 MG/DL (ref 0–1.2)
BUN SERPL-MCNC: 13 MG/DL (ref 6–20)
BUN/CREAT SERPL: 19 (ref 9–23)
CALCIUM SERPL-MCNC: 9.8 MG/DL (ref 8.7–10.2)
CHLORIDE SERPL-SCNC: 101 MMOL/L (ref 96–106)
CHOLEST SERPL-MCNC: 250 MG/DL (ref 100–199)
CO2 SERPL-SCNC: 27 MMOL/L (ref 18–29)
CREAT SERPL-MCNC: 0.68 MG/DL (ref 0.57–1)
GFR SERPLBLD CREATININE-BSD FMLA CKD-EPI: 126 ML/MIN/1.73
GFR SERPLBLD CREATININE-BSD FMLA CKD-EPI: 146 ML/MIN/1.73
GLOBULIN SER CALC-MCNC: 2.6 G/DL (ref 1.5–4.5)
GLUCOSE SERPL-MCNC: 83 MG/DL (ref 65–99)
HDLC SERPL-MCNC: 42 MG/DL
LDLC SERPL CALC-MCNC: 152 MG/DL (ref 0–99)
POTASSIUM SERPL-SCNC: 4.6 MMOL/L (ref 3.5–5.2)
PROT SERPL-MCNC: 6.7 G/DL (ref 6–8.5)
SODIUM SERPL-SCNC: 143 MMOL/L (ref 134–144)
TRIGL SERPL-MCNC: 280 MG/DL (ref 0–149)
VLDLC SERPL CALC-MCNC: 56 MG/DL (ref 5–40)

## 2017-12-09 NOTE — PROGRESS NOTES
Please advise that kidney functions are fine and her liver functions are stable, no change. Her cholesterol numbers are elevated. I would like her to focus on her triglycerides in lowering them which will help lower the other numbers as well as her liver function test. Advise her to eat foods rich in Omega 3 and Niacin and less fried foods or rich fatty foods. Exercise will also help.

## 2017-12-11 ENCOUNTER — TELEPHONE (OUTPATIENT)
Dept: FAMILY MEDICINE CLINIC | Age: 22
End: 2017-12-11

## 2017-12-11 NOTE — PROGRESS NOTES
Care giver is aware of the following message from Ms. Martin NP:  Please advise that kidney functions are fine and her liver functions are stable, no change. Her cholesterol numbers are elevated. I would like her to focus on her triglycerides in lowering them which will help lower the other numbers as well as her liver function test. Advise her to eat foods rich in Omega 3 and Niacin and less fried foods or rich fatty foods. Exercise will also help. Care giver verbalizes understanding.

## 2017-12-11 NOTE — TELEPHONE ENCOUNTER
pts residential caregiver Fatoumata calling back re: omega 3 and niacin    She said she looked up foods rich in these and pt is very picky and most likely will not eat these foods    Asking if these may be taken as vitamins?

## 2017-12-14 NOTE — TELEPHONE ENCOUNTER
Spoke with nate informing her that patient needs to take omega 3 one gram a day. She verbally understood.

## 2017-12-26 NOTE — TELEPHONE ENCOUNTER
Max Prather with group home re: omega 3     Said they working on giving pt food containing omega 3's but can not give the omega 3 1 gram a day pill until they get the prescription/letter for it to have on file in her chart (do to insurance regulations)

## 2018-01-02 RX ORDER — GLUCOSAM/CHONDRO/HERB 149/HYAL 750-100 MG
1000 TABLET ORAL DAILY
Qty: 90 CAP | Refills: 1 | Status: SHIPPED | OUTPATIENT
Start: 2018-01-02 | End: 2018-07-16

## 2018-01-08 ENCOUNTER — OFFICE VISIT (OUTPATIENT)
Dept: FAMILY MEDICINE CLINIC | Age: 23
End: 2018-01-08

## 2018-01-08 VITALS
WEIGHT: 202.6 LBS | OXYGEN SATURATION: 93 % | HEART RATE: 102 BPM | BODY MASS INDEX: 34.59 KG/M2 | RESPIRATION RATE: 16 BRPM | TEMPERATURE: 97.3 F | SYSTOLIC BLOOD PRESSURE: 122 MMHG | HEIGHT: 64 IN | DIASTOLIC BLOOD PRESSURE: 84 MMHG

## 2018-01-08 DIAGNOSIS — J02.9 SORE THROAT: Primary | ICD-10-CM

## 2018-01-08 DIAGNOSIS — J01.00 ACUTE NON-RECURRENT MAXILLARY SINUSITIS: ICD-10-CM

## 2018-01-08 LAB
S PYO AG THROAT QL: NEGATIVE
VALID INTERNAL CONTROL?: YES

## 2018-01-08 RX ORDER — CEFUROXIME AXETIL 500 MG/1
500 TABLET ORAL 2 TIMES DAILY
Qty: 20 TAB | Refills: 0 | Status: SHIPPED | OUTPATIENT
Start: 2018-01-08 | End: 2018-02-01 | Stop reason: ALTCHOICE

## 2018-01-08 NOTE — PROGRESS NOTES
1. Have you been to the ER, urgent care clinic since your last visit? Hospitalized since your last visit? No    2. Have you seen or consulted any other health care providers outside of the [address] [place] [address] since your last visit? Include any pap smears or colon screening.  No

## 2018-01-08 NOTE — MR AVS SNAPSHOT
Visit Information Date & Time Provider Department Dept. Phone Encounter #  
 1/8/2018 12:00 PM Nataliya Samayoa NP [facility] [phone] [id number] Your Appointments 1/8/2018 12:00 PM  
Office Visit with Nataliya Samayoa NP [facility] ([facility]) Appt Note: sore throat [address] [address]  
[phone]  
  
   
 [address],  [address] Upcoming Health Maintenance Date Due  
 HPV AGE 9Y-34Y (1 of 3 - Female 3 Dose Series) 12/30/2007 DTaP/Tdap/Td series (1 - Tdap) 12/30/2017 PAP AKA CERVICAL CYTOLOGY 12/30/2017 Allergies as of 1/8/2018  Review Complete On: 1/8/2018 By: Nataliya Samayoa NP No Known Allergies Current Immunizations  Never Reviewed Name Date Influenza Vaccine (Quad) PF 9/18/2017 TB Skin Test (PPD) Intradermal 9/18/2017 Not reviewed this visit You Were Diagnosed With   
  
 Codes Comments Sore throat    -  Primary ICD-10-CM: J02.9 ICD-9-CM: 061 Acute non-recurrent maxillary sinusitis     ICD-10-CM: J01.00 ICD-9-CM: 461.0 Vitals BP Pulse Temp Resp Height(growth percentile) Weight(growth percentile) 122/84 (BP 1 Location: Left arm, BP Patient Position: Sitting) (!) 102 97.3 °F (36.3 °C) (Oral) 16 5' 4\" (1.626 m) 202 lb 9.6 oz (91.9 kg) SpO2 BMI OB Status Smoking Status 93% 34.78 kg/m2 Implant Never Smoker BMI and BSA Data Body Mass Index Body Surface Area 34.78 kg/m 2 2.04 m 2 Your Updated Medication List  
  
   
This list is accurate as of: 1/8/18 11:09 AM.  Always use your most recent med list.  
  
  
  
  
 cefUROXime 500 mg tablet Commonly known as:  CEFTIN Take 1 Tab by mouth two (2) times a day. clindamycin-benzoyl peroxide 1-5 % topical gel Commonly known as:  Wava Carolus Apply  to affected area two (2) times a day.  Apply to affected area after the skin has been cleansed and dried. docusate sodium 100 mg capsule Commonly known as:  Camryn Peeling Take 100 mg by mouth two (2) times a day.  
  
 haloperidol 5 mg tablet Commonly known as:  HALDOL LORazepam 0.5 mg tablet Commonly known as:  ATIVAN Take 0.5 mg by mouth every eight (8) hours as needed for Anxiety. MIRALAX 17 gram/dose powder Generic drug:  polyethylene glycol Take 17 g by mouth daily. naproxen sodium 550 mg tablet Commonly known as:  Velna Phalen Take 550 mg by mouth two (2) times daily as needed. Omega-3-DHA-EPA-Fish Oil 1,000 mg (120 mg-180 mg) Cap Take 1,000 Caps by mouth daily. OXcarbaxepine 600 mg tablet Commonly known as:  TRILEPTAL Take 1 Tab by mouth two (2) times a day. Indications: mood stabilization  
  
 pravastatin 40 mg tablet Commonly known as:  PRAVACHOL Take 0.5 Tabs by mouth nightly. Indications: hypercholesterolemia SEROquel 50 mg tablet Generic drug:  QUEtiapine Take 50 mg by mouth nightly. TESSALON PERLES 100 mg capsule Generic drug:  benzonatate Take 100 mg by mouth three (3) times daily as needed for Cough. Prescriptions Printed Refills  
 cefUROXime (CEFTIN) 500 mg tablet 0 Sig: Take 1 Tab by mouth two (2) times a day. Class: Print Route: Oral  
  
We Performed the Following AMB POC RAPID STREP A [54895 CPT(R)] Introducing [facility]! Dear Wild Xie: Thank you for requesting a [employer] account. Our records indicate that you already have an active [employer] account. You can access your account anytime at [email or web address] Did you know that you can access your hospital and ER discharge instructions at any time in [employer] You can also review all of your test results from your hospital stay or ER visit. Additional Information If you have questions, please visit the Frequently Asked Questions section of the [employer] website at [email or web address] Remember, [employer] is NOT to be used for urgent needs. For medical emergencies, dial 911. Now available from your iPhone and Android! Please provide this summary of care documentation to your next provider. Your primary care clinician is listed as Patricia Tovar. If you have any questions after today's visit, please call [phone].

## 2018-01-08 NOTE — PROGRESS NOTES
HISTORY OF PRESENT ILLNESS  Neymar Ca is a 25 y.o. female. Patient presents today for a sore throat. HPI  Pt has had a post nasal drip for 2 weeks now and the congestion is yellow in color. Review of Systems   Constitutional: Negative. HENT: Positive for congestion, sinus pain and sore throat. Respiratory: Negative. Cardiovascular: Negative. Visit Vitals    /84 (BP 1 Location: Left arm, BP Patient Position: Sitting)    Pulse (!) 102    Temp 97.3 °F (36.3 °C) (Oral)    Resp 16    Ht 5' 4\" (1.626 m)    Wt 202 lb 9.6 oz (91.9 kg)    SpO2 93%    BMI 34.78 kg/m2       Physical Exam   Constitutional: She appears well-developed. No distress. HENT:   Right Ear: A middle ear effusion is present. Left Ear: A middle ear effusion (left greater than right.) is present. Nose: Mucosal edema present. Mouth/Throat: Posterior oropharyngeal erythema present. Neck: Normal range of motion. Neck supple. Cardiovascular: Normal rate, regular rhythm and normal heart sounds. No murmur heard. Pulmonary/Chest: Effort normal and breath sounds normal. No respiratory distress. She has no wheezes. She has no rales. ASSESSMENT and PLAN    ICD-10-CM ICD-9-CM    1. Sore throat J02.9 462 AMB POC RAPID STREP A   2. Acute non-recurrent maxillary sinusitis J01.00 461.0 cefUROXime (CEFTIN) 500 mg tablet     Pt aware that Q strep test is negative  Forms completed and copied. Pt is to RTC or call if symptoms persist or worsen. Pt was given after visit summary.

## 2018-02-01 ENCOUNTER — OFFICE VISIT (OUTPATIENT)
Dept: FAMILY MEDICINE CLINIC | Age: 23
End: 2018-02-01

## 2018-02-01 VITALS
BODY MASS INDEX: 35.75 KG/M2 | DIASTOLIC BLOOD PRESSURE: 78 MMHG | WEIGHT: 209.4 LBS | HEART RATE: 91 BPM | HEIGHT: 64 IN | SYSTOLIC BLOOD PRESSURE: 111 MMHG | RESPIRATION RATE: 16 BRPM | TEMPERATURE: 98.1 F

## 2018-02-01 DIAGNOSIS — M79.672 LEFT FOOT PAIN: Primary | ICD-10-CM

## 2018-02-01 RX ORDER — NAPROXEN SODIUM 550 MG/1
550 TABLET ORAL 2 TIMES DAILY WITH MEALS
Qty: 20 TAB | Refills: 0 | Status: SHIPPED | OUTPATIENT
Start: 2018-02-01 | End: 2018-02-11

## 2018-02-01 NOTE — MR AVS SNAPSHOT
[facility] 
 
 
 [address] [address] [address] [facility] [place] [address] 
[phone] Patient: Cherie Howell MRN: [id number] Visit Information Date & Time Provider Department Dept. Phone Encounter #  
 2/1/2018  2:45 PM Annel Vaughan NP [facility] [employer] [address] [id number] Upcoming Health Maintenance Date Due  
 HPV AGE 9Y-34Y (1 of 3 - Female 3 Dose Series) 12/30/2007 DTaP/Tdap/Td series (1 - Tdap) 12/30/2017 PAP AKA CERVICAL CYTOLOGY 12/30/2017 Allergies as of 2/1/2018  Review Complete On: 2/1/2018 By: Annel Vaughan NP No Known Allergies Current Immunizations  Never Reviewed Name Date Influenza Vaccine (Quad) PF 9/18/2017 TB Skin Test (PPD) Intradermal 9/18/2017 Not reviewed this visit You Were Diagnosed With   
  
 Codes Comments Left foot pain    -  Primary ICD-10-CM: K43.328 ICD-9-CM: 729.5 Vitals BP Pulse Temp Resp Height(growth percentile) Weight(growth percentile) 111/78 (BP 1 Location: Right arm, BP Patient Position: Sitting) 91 98.1 °F (36.7 °C) (Oral) 16 5' 4\" (1.626 m) 209 lb 6.4 oz (95 kg) BMI OB Status Smoking Status 35.94 kg/m2 Implant Never Smoker BMI and BSA Data Body Mass Index Body Surface Area 35.94 kg/m 2 2.07 m 2 Your Updated Medication List  
  
   
This list is accurate as of: 2/1/18  2:56 PM.  Always use your most recent med list.  
  
  
  
  
 clindamycin-benzoyl peroxide 1-5 % topical gel Commonly known as:  Ivan Pauline Apply  to affected area two (2) times a day. Apply to affected area after the skin has been cleansed and dried. docusate sodium 100 mg capsule Commonly known as:  Ashley Finner Take 100 mg by mouth two (2) times a day.  
  
 haloperidol 5 mg tablet Commonly known as:  HALDOL LORazepam 0.5 mg tablet Commonly known as:  ATIVAN Take 0.5 mg by mouth every eight (8) hours as needed for Anxiety. MIRALAX 17 gram/dose powder Generic drug:  polyethylene glycol Take 17 g by mouth daily. naproxen sodium 550 mg tablet Commonly known as:  [employer] DS Take 1 Tab by mouth two (2) times daily (with meals) for 10 days. Omega-3-DHA-EPA-Fish Oil 1,000 mg (120 mg-180 mg) Cap Take 1,000 Caps by mouth daily. OXcarbaxepine 600 mg tablet Commonly known as:  TRILEPTAL Take 1 Tab by mouth two (2) times a day. Indications: mood stabilization  
  
 pravastatin 40 mg tablet Commonly known as:  PRAVACHOL Take 0.5 Tabs by mouth nightly. Indications: hypercholesterolemia SEROquel 50 mg tablet Generic drug:  QUEtiapine Take 50 mg by mouth nightly. Prescriptions Printed Refills  
 naproxen sodium (ANAPROX DS) 550 mg tablet 0 Sig: Take 1 Tab by mouth two (2) times daily (with meals) for 10 days. Class: Print Route: Oral  
  
To-Do List   
 02/01/2018 Imaging:  XR FOOT LT MIN 3 V Introducing [facility]! Dear Mely Doll: Thank you for requesting a [employer] account. Our records indicate that you already have an active [employer] account. You can access your account anytime at [email or web address] Did you know that you can access your hospital and ER discharge instructions at any time in [employer]? You can also review all of your test results from your hospital stay or ER visit. Additional Information If you have questions, please visit the Frequently Asked Questions section of the [employer] website at [email or web address] Remember, [employer] is NOT to be used for urgent needs. For medical emergencies, dial 911. Now available from your iPhone and Android! Please provide this summary of care documentation to your next provider. Your primary care clinician is listed as Maryse Lai. If you have any questions after today's visit, please call [phone].

## 2018-02-01 NOTE — PROGRESS NOTES
HISTORY OF PRESENT ILLNESS  Negrito Pulliam is a 25 y.o. female. Patient presents today with ankle pain. HPI  Pt twisted her foot while dancing. Pt can not recall exactly when/what date. It hurts on the top and when she walks. Review of Systems   Constitutional: Negative. Musculoskeletal: Positive for joint pain (left foot pain at the top.). Visit Vitals    /78 (BP 1 Location: Right arm, BP Patient Position: Sitting)    Pulse 91    Temp 98.1 °F (36.7 °C) (Oral)    Resp 16    Ht 5' 4\" (1.626 m)    Wt 209 lb 6.4 oz (95 kg)    BMI 35.94 kg/m2       Physical Exam   Constitutional: She appears well-developed and well-nourished. No distress. Cardiovascular: Normal rate, regular rhythm and normal heart sounds. No murmur heard. Pulmonary/Chest: Effort normal and breath sounds normal. No respiratory distress. She has no wheezes. She has no rales. Musculoskeletal:        Right ankle: Normal.        Left ankle: She exhibits normal range of motion and no swelling. No tenderness. Right foot: Normal.        Left foot: There is tenderness. There is normal range of motion, no swelling, no crepitus and no deformity. ASSESSMENT and PLAN    ICD-10-CM ICD-9-CM    1. Left foot pain M79.672 729.5 XR FOOT LT MIN 3 V      naproxen sodium (ANAPROX DS) 550 mg tablet     PLAN:  Pt given after visit summary. Pt will sched a WWE at care giver request.  Form completed. Discussed the patient's BMI with her. The BMI follow up plan is as follows:     dietary management education, guidance, and counseling  encourage exercise  monitor weight  prescribed dietary intake    An After Visit Summary was printed and given to the patient.

## 2018-02-01 NOTE — PATIENT INSTRUCTIONS
Body Mass Index: Care Instructions  Your Care Instructions    Body mass index (BMI) can help you see if your weight is raising your risk for health problems. It uses a formula to compare how much you weigh with how tall you are. · A BMI lower than 18.5 is considered underweight. · A BMI between 18.5 and 24.9 is considered healthy. · A BMI between 25 and 29.9 is considered overweight. A BMI of 30 or higher is considered obese. If your BMI is in the normal range, it means that you have a lower risk for weight-related health problems. If your BMI is in the overweight or obese range, you may be at increased risk for weight-related health problems, such as high blood pressure, heart disease, stroke, arthritis or joint pain, and diabetes. If your BMI is in the underweight range, you may be at increased risk for health problems such as fatigue, lower protection (immunity) against illness, muscle loss, bone loss, hair loss, and hormone problems. BMI is just one measure of your risk for weight-related health problems. You may be at higher risk for health problems if you are not active, you eat an unhealthy diet, or you drink too much alcohol or use tobacco products. Follow-up care is a key part of your treatment and safety. Be sure to make and go to all appointments, and call your doctor if you are having problems. It's also a good idea to know your test results and keep a list of the medicines you take. How can you care for yourself at home? · Practice healthy eating habits. This includes eating plenty of fruits, vegetables, whole grains, lean protein, and low-fat dairy. · If your doctor recommends it, get more exercise. Walking is a good choice. Bit by bit, increase the amount you walk every day. Try for at least 30 minutes on most days of the week. · Do not smoke. Smoking can increase your risk for health problems. If you need help quitting, talk to your doctor about stop-smoking programs and medicines. These can increase your chances of quitting for good. · Limit alcohol to 2 drinks a day for men and 1 drink a day for women. Too much alcohol can cause health problems. If you have a BMI higher than 25  · Your doctor may do other tests to check your risk for weight-related health problems. This may include measuring the distance around your waist. A waist measurement of more than 40 inches in men or 35 inches in women can increase the risk of weight-related health problems. · Talk with your doctor about steps you can take to stay healthy or improve your health. You may need to make lifestyle changes to lose weight and stay healthy, such as changing your diet and getting regular exercise. If you have a BMI lower than 18.5  · Your doctor may do other tests to check your risk for health problems. · Talk with your doctor about steps you can take to stay healthy or improve your health. You may need to make lifestyle changes to gain or maintain weight and stay healthy, such as getting more healthy foods in your diet and doing exercises to build muscle. Where can you learn more? Go to http://paty-jennifer.info/. Enter S176 in the search box to learn more about \"Body Mass Index: Care Instructions. \"  Current as of: October 13, 2016  Content Version: 11.4  © 9885-9905 Healthwise, Incorporated. Care instructions adapted under license by [employer]â„¢ (which disclaims liability or warranty for this information). If you have questions about a medical condition or this instruction, always ask your healthcare professional. [address] any warranty or liability for your use of this information.

## 2018-02-01 NOTE — PROGRESS NOTES
1. Have you been to the ER, urgent care clinic since your last visit? Hospitalized since your last visit? No    2. Have you seen or consulted any other health care providers outside of the [address] [place] [address] since your last visit? Include any pap smears or colon screening.  No

## 2018-02-05 ENCOUNTER — HOSPITAL ENCOUNTER (OUTPATIENT)
Dept: GENERAL RADIOLOGY | Age: 23
Discharge: HOME | End: 2018-02-05
Payer: MEDICAID

## 2018-02-05 ENCOUNTER — TELEPHONE (OUTPATIENT)
Dept: FAMILY MEDICINE CLINIC | Age: 23
End: 2018-02-05

## 2018-02-05 DIAGNOSIS — M79.672 LEFT FOOT PAIN: ICD-10-CM

## 2018-02-05 PROCEDURE — 73630 X-RAY EXAM OF FOOT: CPT

## 2018-02-05 NOTE — TELEPHONE ENCOUNTER
----- Message from Corry Morales NP sent at 2/5/2018  1:11 PM EST -----  Please advise Pt's care giver that there is no fracture.  A sprain can take 3-6weeks to heal.

## 2018-02-14 ENCOUNTER — OFFICE VISIT (OUTPATIENT)
Dept: FAMILY MEDICINE CLINIC | Age: 23
End: 2018-02-14

## 2018-02-14 VITALS
RESPIRATION RATE: 24 BRPM | BODY MASS INDEX: 35.68 KG/M2 | DIASTOLIC BLOOD PRESSURE: 78 MMHG | TEMPERATURE: 97.5 F | HEIGHT: 64 IN | OXYGEN SATURATION: 98 % | WEIGHT: 209 LBS | SYSTOLIC BLOOD PRESSURE: 110 MMHG | HEART RATE: 108 BPM

## 2018-02-14 DIAGNOSIS — Z01.419 WELL WOMAN EXAM WITH ROUTINE GYNECOLOGICAL EXAM: Primary | ICD-10-CM

## 2018-02-14 DIAGNOSIS — Z12.4 ENCOUNTER FOR SCREENING FOR CERVICAL CANCER: ICD-10-CM

## 2018-02-14 NOTE — MR AVS SNAPSHOT
[address] 
 
 
 [address] [place], [place] [address] [address] 
[phone] Patient: Margaret Friend MRN: [id number] Visit Information Date & Time Provider Department Dept. Phone Encounter #  
 2/14/2018  9:00 AM Vianca Gudino, [address] [id number] Your Appointments 2/14/2018  9:00 AM  
PHYSICAL with Vianca Gudino NP [facility] ([facility]) Appt Note: CPE  
 [address], [facility] [address] [address]  
[phone]  
  
   
 [address],  [address] Upcoming Health Maintenance Date Due  
 HPV AGE 9Y-34Y (1 of 3 - Female 3 Dose Series) 12/30/2007 DTaP/Tdap/Td series (1 - Tdap) 12/30/2017 PAP AKA CERVICAL CYTOLOGY 12/30/2017 Allergies as of 2/14/2018  Review Complete On: 2/14/2018 By: Vianca Gudino NP No Known Allergies Current Immunizations  Never Reviewed Name Date Influenza Vaccine (Quad) PF 9/18/2017 TB Skin Test (PPD) Intradermal 9/18/2017 Not reviewed this visit You Were Diagnosed With   
  
 Codes Comments Well woman exam with routine gynecological exam    -  Primary ICD-10-CM: R15.252 ICD-9-CM: V72.31 Vitals BP Pulse Temp Resp Height(growth percentile) Weight(growth percentile) 110/78 (BP 1 Location: Right arm, BP Patient Position: Sitting) (!) 108 97.5 °F (36.4 °C) (Oral) 24 5' 4\" (1.626 m) 209 lb (94.8 kg) SpO2 BMI OB Status Smoking Status 98% 35.87 kg/m2 Implant Never Smoker Vitals History BMI and BSA Data Body Mass Index Body Surface Area  
 35.87 kg/m 2 2.07 m 2 Your Updated Medication List  
  
   
This list is accurate as of: 2/14/18  8:40 AM.  Always use your most recent med list.  
  
  
  
  
 clindamycin-benzoyl peroxide 1-5 % topical gel Commonly known as:  Margene Felty Apply  to affected area two (2) times a day.  Apply to affected area after the skin has been cleansed and dried. docusate sodium 100 mg capsule Commonly known as:  Francois Lav Take 100 mg by mouth two (2) times a day.  
  
 haloperidol 5 mg tablet Commonly known as:  HALDOL LORazepam 0.5 mg tablet Commonly known as:  ATIVAN Take 0.5 mg by mouth every eight (8) hours as needed for Anxiety. MIRALAX 17 gram/dose powder Generic drug:  polyethylene glycol Take 17 g by mouth daily. Omega-3-DHA-EPA-Fish Oil 1,000 mg (120 mg-180 mg) Cap Take 1,000 Caps by mouth daily. OXcarbaxepine 600 mg tablet Commonly known as:  TRILEPTAL Take 1 Tab by mouth two (2) times a day. Indications: mood stabilization  
  
 pravastatin 40 mg tablet Commonly known as:  PRAVACHOL Take 0.5 Tabs by mouth nightly. Indications: hypercholesterolemia SEROquel 50 mg tablet Generic drug:  QUEtiapine Take 50 mg by mouth nightly. Introducing [facility]! Dear Jack Hay: Thank you for requesting a [employer] account. Our records indicate that you already have an active [employer] account. You can access your account anytime at [email or web address] Did you know that you can access your hospital and ER discharge instructions at any time in [employer]? You can also review all of your test results from your hospital stay or ER visit. Additional Information If you have questions, please visit the Frequently Asked Questions section of the [employer] website at [email or web address] Remember, [employer] is NOT to be used for urgent needs. For medical emergencies, dial 911. Now available from your iPhone and Android! Please provide this summary of care documentation to your next provider. Your primary care clinician is listed as Be Nolen. If you have any questions after today's visit, please call [phone].

## 2018-02-14 NOTE — PROGRESS NOTES
1. Have you been to the ER, urgent care clinic since your last visit? Hospitalized since your last visit? No    2. Have you seen or consulted any other health care providers outside of the [place] [address] since your last visit? Include any pap smears or colon screening. No      Subjective:   25 y.o. female for Well Woman Check. No LMP recorded. Patient has had an implant. Social History: not sexually active. Pertinent past medical hstory: no history of HTN, DVT, CAD, DM, liver disease, migraines or smoking. Patient Active Problem List    Diagnosis Date Noted    Attention deficit hyperactivity disorder (ADHD), combined type 05/30/2017    Bipolar 1 disorder ([facility] [address] 05/25/2017     Current Outpatient Prescriptions   Medication Sig Dispense Refill    Omega-3-DHA-EPA-Fish Oil 1,000 mg (120 mg-180 mg) cap Take 1,000 Caps by mouth daily. 90 Cap 1    haloperidol (HALDOL) 5 mg tablet   0    pravastatin (PRAVACHOL) 40 mg tablet Take 0.5 Tabs by mouth nightly. Indications: hypercholesterolemia 90 Tab 1    clindamycin-benzoyl peroxide (BENZACLIN) 1-5 % topical gel Apply  to affected area two (2) times a day. Apply to affected area after the skin has been cleansed and dried. 1 Tube 5    polyethylene glycol (MIRALAX) 17 gram/dose powder Take 17 g by mouth daily.  QUEtiapine (SEROQUEL) 50 mg tablet Take 50 mg by mouth nightly.  LORazepam (ATIVAN) 0.5 mg tablet Take 0.5 mg by mouth every eight (8) hours as needed for Anxiety.  docusate sodium (COLACE) 100 mg capsule Take 100 mg by mouth two (2) times a day.  OXcarbazepine (TRILEPTAL) 600 mg tablet Take 1 Tab by mouth two (2) times a day.  Indications: mood stabilization 60 Tab 0     No Known Allergies  Past Medical History:   Diagnosis Date    ADHD (attention deficit hyperactivity disorder)     Bipolar disorder (HCC)     Borderline personality disorder     Mild mental retardation     PMS (premenstrual syndrome)     Thromboembolus [facility]) 2013    PE     No past surgical history on file. No family history on file. Social History   Substance Use Topics    Smoking status: Never Smoker    Smokeless tobacco: Never Used    Alcohol use Yes        ROS:  Feeling well. No dyspnea or chest pain on exertion. No abdominal pain, change in bowel habits, black or bloody stools. No urinary tract symptoms. GYN ROS: normal menses, no abnormal bleeding, pelvic pain or discharge, no breast pain or new or enlarging lumps on self exam. No neurological complaints. Objective:     Visit Vitals    /78 (BP 1 Location: Right arm, BP Patient Position: Sitting)    Pulse (!) 108    Temp 97.5 °F (36.4 °C) (Oral)    Resp 24    Wt 209 lb (94.8 kg)    SpO2 98%    BMI 35.87 kg/m2     The patient appears well, alert, oriented x 3, in no distress. ENT normal.  Neck supple. No adenopathy or thyromegaly. ANYI. Lungs are clear, good air entry, no wheezes, rhonchi or rales. S1 and S2 normal, no murmurs, regular rate and rhythm. Abdomen soft without tenderness, guarding, mass or organomegaly. Extremities show no edema, normal peripheral pulses. Neurological is normal, no focal findings. BREAST EXAM: breasts appear normal, no suspicious masses, no skin or nipple changes or axillary nodes    PELVIC EXAM: normal external genitalia, vulva, vagina, cervix, uterus and adnexa        Assessment/Plan:   well woman  pap smear  return annually or prn    ICD-10-CM ICD-9-CM    1. Well woman exam with routine gynecological exam Z01.419 V72.31    . PLAN:  I completed form for Care Giver and Care giver was given after visit summary.

## 2018-02-16 ENCOUNTER — PATIENT MESSAGE (OUTPATIENT)
Dept: FAMILY MEDICINE CLINIC | Age: 23
End: 2018-02-16

## 2018-02-16 LAB
CYTOLOGIST CVX/VAG CYTO: NORMAL
DX ICD CODE: NORMAL
LABCORP, 190119: NORMAL
Lab: NORMAL
OTHER STN SPEC: NORMAL
PATH REPORT.FINAL DX SPEC: NORMAL
STAT OF ADQ CVX/VAG CYTO-IMP: NORMAL

## 2018-02-19 NOTE — TELEPHONE ENCOUNTER
Spoke jeanne sullivan informing her that patients pap is Normal and she states patient is having urinary frequency and scheduled appointment for 02/26. Informed her to take her to urgent care or er if symptoms get worse.

## 2018-02-26 ENCOUNTER — TELEPHONE (OUTPATIENT)
Dept: FAMILY MEDICINE CLINIC | Age: 23
End: 2018-02-26

## 2018-02-26 ENCOUNTER — OFFICE VISIT (OUTPATIENT)
Dept: FAMILY MEDICINE CLINIC | Age: 23
End: 2018-02-26

## 2018-02-26 VITALS
TEMPERATURE: 98.2 F | RESPIRATION RATE: 16 BRPM | DIASTOLIC BLOOD PRESSURE: 84 MMHG | OXYGEN SATURATION: 96 % | HEIGHT: 64 IN | HEART RATE: 106 BPM | WEIGHT: 201.8 LBS | BODY MASS INDEX: 34.45 KG/M2 | SYSTOLIC BLOOD PRESSURE: 118 MMHG

## 2018-02-26 DIAGNOSIS — N30.00 ACUTE CYSTITIS WITHOUT HEMATURIA: ICD-10-CM

## 2018-02-26 DIAGNOSIS — R30.9 URINARY PAIN: Primary | ICD-10-CM

## 2018-02-26 LAB
BILIRUB UR QL STRIP: NEGATIVE
GLUCOSE UR-MCNC: NEGATIVE MG/DL
KETONES P FAST UR STRIP-MCNC: NEGATIVE MG/DL
PH UR STRIP: 5.5 [PH] (ref 4.6–8)
PROT UR QL STRIP: NEGATIVE
SP GR UR STRIP: 1.02 (ref 1–1.03)
UA UROBILINOGEN AMB POC: NORMAL (ref 0.2–1)
URINALYSIS CLARITY POC: NORMAL
URINALYSIS COLOR POC: YELLOW
URINE BLOOD POC: NEGATIVE
URINE LEUKOCYTES POC: NORMAL
URINE NITRITES POC: NEGATIVE

## 2018-02-26 RX ORDER — NITROFURANTOIN 25; 75 MG/1; MG/1
100 CAPSULE ORAL 2 TIMES DAILY
Qty: 20 CAP | Refills: 0 | Status: SHIPPED | OUTPATIENT
Start: 2018-02-26 | End: 2018-03-21

## 2018-02-26 NOTE — TELEPHONE ENCOUNTER
[facility] request change the following prescription mg    Request change Pravastatin to 20 mg with directions changed to take 1 tab by mouth nighty    (stated the current directions state take 0.5 tabs by mouth nightly.

## 2018-02-26 NOTE — PROGRESS NOTES
HISTORY OF PRESENT ILLNESS  Sandra Albert is a 25 y.o. female. Patient presents  For urinary frequency. HPI  Pt is going to bathroom freq and it burns when she goes. Review of Systems   Constitutional: Negative. Respiratory: Negative. Cardiovascular: Negative. Gastrointestinal: Negative. Genitourinary: Positive for dysuria, frequency and urgency. Negative for flank pain and hematuria. Visit Vitals    /84 (BP 1 Location: Right arm, BP Patient Position: Sitting)    Pulse (!) 106    Temp 98.2 °F (36.8 °C) (Oral)    Resp 16    Ht 5' 4\" (1.626 m)    Wt 201 lb 12.8 oz (91.5 kg)    SpO2 96%    BMI 34.64 kg/m2       Physical Exam   Constitutional: She appears well-developed. No distress. Cardiovascular: Normal rate, regular rhythm and normal heart sounds. No murmur heard. Pulmonary/Chest: Effort normal and breath sounds normal. No respiratory distress. She has no wheezes. She has no rales. Abdominal: There is no CVA tenderness. ASSESSMENT and PLAN    ICD-10-CM ICD-9-CM    1. Urinary pain R30.9 788.1 AMB POC URINALYSIS DIP STICK AUTO W/O MICRO   2. Acute cystitis without hematuria N30.00 595.0 CULTURE, URINE      nitrofurantoin, macrocrystal-monohydrate, (MACROBID) 100 mg capsule     PLAN  Pt advised to drink more water. Care giver to call with any concerns. Care giver given after visit summary.

## 2018-02-26 NOTE — MR AVS SNAPSHOT
[address] [facility], [place] 
 
 
 [address] [place] [address] [address] [facility] [address] 
[phone] Patient: Pati Chin MRN: [id number] SGR:[id number] Visit Information Date & Time Provider Department Dept. Phone Encounter #  
 2/26/2018  9:15 AM Rosalva Workman NP [place] [address] [facility] [id number] Upcoming Health Maintenance Date Due  
 HPV AGE 9Y-34Y (1 of 3 - Female 3 Dose Series) 12/30/2007 DTaP/Tdap/Td series (1 - Tdap) 12/30/2017 PAP AKA CERVICAL CYTOLOGY 2/14/2021 Allergies as of 2/26/2018  Review Complete On: 2/26/2018 By: Rosalva Workman NP No Known Allergies Current Immunizations  Never Reviewed Name Date Influenza Vaccine (Quad) PF 9/18/2017 TB Skin Test (PPD) Intradermal 9/18/2017 Not reviewed this visit You Were Diagnosed With   
  
 Codes Comments Urinary pain    -  Primary ICD-10-CM: R30.9 ICD-9-CM: 120. 1 Acute cystitis without hematuria     ICD-10-CM: N30.00 ICD-9-CM: 595.0 Vitals BP Pulse Temp Resp Height(growth percentile) Weight(growth percentile) 118/84 (BP 1 Location: Right arm, BP Patient Position: Sitting) (!) 106 98.2 °F (36.8 °C) (Oral) 16 5' 4\" (1.626 m) 201 lb 12.8 oz (91.5 kg) SpO2 BMI OB Status Smoking Status 96% 34.64 kg/m2 Implant Never Smoker BMI and BSA Data Body Mass Index Body Surface Area  
 34.64 kg/m 2 2.03 m 2 Your Updated Medication List  
  
   
This list is accurate as of 2/26/18  9:29 AM.  Always use your most recent med list.  
  
  
  
  
 clindamycin-benzoyl peroxide 1-5 % topical gel Commonly known as:  Chichi Rakes Apply  to affected area two (2) times a day. Apply to affected area after the skin has been cleansed and dried. docusate sodium 100 mg capsule Commonly known as:  Jennifer Gowers Take 100 mg by mouth two (2) times a day.  
  
 haloperidol 5 mg tablet Commonly known as:  HALDOL LORazepam 0.5 mg tablet Commonly known as:  ATIVAN Take 0.5 mg by mouth every eight (8) hours as needed for Anxiety. MIRALAX 17 gram/dose powder Generic drug:  polyethylene glycol Take 17 g by mouth daily. nitrofurantoin (macrocrystal-monohydrate) 100 mg capsule Commonly known as:  MACROBID Take 1 Cap by mouth two (2) times a day. Omega-3-DHA-EPA-Fish Oil 1,000 mg (120 mg-180 mg) Cap Take 1,000 Caps by mouth daily. OXcarbaxepine 600 mg tablet Commonly known as:  TRILEPTAL Take 1 Tab by mouth two (2) times a day. Indications: mood stabilization  
  
 pravastatin 40 mg tablet Commonly known as:  PRAVACHOL Take 0.5 Tabs by mouth nightly. Indications: hypercholesterolemia SEROquel 50 mg tablet Generic drug:  QUEtiapine Take 50 mg by mouth nightly. Prescriptions Printed Refills  
 nitrofurantoin, macrocrystal-monohydrate, (MACROBID) 100 mg capsule 0 Sig: Take 1 Cap by mouth two (2) times a day. Class: Print Route: Oral  
  
We Performed the Following AMB POC URINALYSIS DIP STICK AUTO W/O MICRO [15590 CPT(R)] To-Do List   
 02/26/2018 Microbiology:  CULTURE, URINE Introducing \[facility] [place] [facility]\"" & [facility]! Dear Malick Lozano: Thank you for requesting a [employer] account. Our records indicate that you already have an active [employer] account. You can access your account anytime at [email or web address] Did you know that you can access your hospital and ER discharge instructions at any time in [employer]? You can also review all of your test results from your hospital stay or ER visit. Additional Information If you have questions, please visit the Frequently Asked Questions section of the [employer] website at [email or web address] Remember, [employer] is NOT to be used for urgent needs. For medical emergencies, dial 911. Now available from your iPhone and Android! Please provide this summary of care documentation to your next provider. Your primary care clinician is listed as Juan Carlos Mccallum. If you have any questions after today's visit, please call [phone].

## 2018-02-27 DIAGNOSIS — E78.2 MIXED HYPERLIPIDEMIA: ICD-10-CM

## 2018-02-27 RX ORDER — PRAVASTATIN SODIUM 20 MG/1
20 TABLET ORAL
Qty: 90 TAB | Refills: 1 | Status: SHIPPED | OUTPATIENT
Start: 2018-02-27 | End: 2018-08-31 | Stop reason: SDUPTHER

## 2018-02-28 LAB — BACTERIA UR CULT: NORMAL

## 2018-02-28 NOTE — PROGRESS NOTES
Please advise Pt's Care giver  That the urine culture is negative for a bacterial infection. How are her symptoms?

## 2018-03-21 ENCOUNTER — TELEPHONE (OUTPATIENT)
Dept: FAMILY MEDICINE CLINIC | Age: 23
End: 2018-03-21

## 2018-03-21 ENCOUNTER — OFFICE VISIT (OUTPATIENT)
Dept: FAMILY MEDICINE CLINIC | Age: 23
End: 2018-03-21

## 2018-03-21 VITALS
OXYGEN SATURATION: 97 % | RESPIRATION RATE: 18 BRPM | DIASTOLIC BLOOD PRESSURE: 77 MMHG | SYSTOLIC BLOOD PRESSURE: 107 MMHG | HEIGHT: 64 IN | BODY MASS INDEX: 36.37 KG/M2 | TEMPERATURE: 98.1 F | HEART RATE: 98 BPM | WEIGHT: 213 LBS

## 2018-03-21 DIAGNOSIS — T36.95XA ANTIBIOTIC-INDUCED YEAST INFECTION: ICD-10-CM

## 2018-03-21 DIAGNOSIS — R10.9 ACUTE RIGHT FLANK PAIN: Primary | ICD-10-CM

## 2018-03-21 DIAGNOSIS — B37.9 ANTIBIOTIC-INDUCED YEAST INFECTION: ICD-10-CM

## 2018-03-21 DIAGNOSIS — N30.00 ACUTE CYSTITIS WITHOUT HEMATURIA: ICD-10-CM

## 2018-03-21 LAB
BILIRUB UR QL STRIP: NEGATIVE
GLUCOSE UR-MCNC: NEGATIVE MG/DL
KETONES P FAST UR STRIP-MCNC: NEGATIVE MG/DL
PH UR STRIP: 6.5 [PH] (ref 4.6–8)
PROT UR QL STRIP: NEGATIVE
SP GR UR STRIP: 1.02 (ref 1–1.03)
UA UROBILINOGEN AMB POC: NORMAL (ref 0.2–1)
URINALYSIS CLARITY POC: CLEAR
URINALYSIS COLOR POC: YELLOW
URINE BLOOD POC: NEGATIVE
URINE LEUKOCYTES POC: NORMAL
URINE NITRITES POC: NEGATIVE

## 2018-03-21 RX ORDER — FLUCONAZOLE 150 MG/1
150 TABLET ORAL DAILY
Qty: 1 TAB | Refills: 0 | Status: SHIPPED | OUTPATIENT
Start: 2018-03-21 | End: 2018-03-21 | Stop reason: SDUPTHER

## 2018-03-21 RX ORDER — NITROFURANTOIN 25; 75 MG/1; MG/1
100 CAPSULE ORAL 2 TIMES DAILY
Qty: 20 CAP | Refills: 0 | Status: SHIPPED | OUTPATIENT
Start: 2018-03-21 | End: 2018-07-16

## 2018-03-21 RX ORDER — FLUCONAZOLE 150 MG/1
150 TABLET ORAL DAILY
Qty: 1 TAB | Refills: 0 | Status: SHIPPED | OUTPATIENT
Start: 2018-03-21 | End: 2018-03-22

## 2018-03-21 RX ORDER — NITROFURANTOIN 25; 75 MG/1; MG/1
100 CAPSULE ORAL 2 TIMES DAILY
Qty: 20 CAP | Refills: 0 | Status: SHIPPED | OUTPATIENT
Start: 2018-03-21 | End: 2018-03-21 | Stop reason: SDUPTHER

## 2018-03-21 NOTE — TELEPHONE ENCOUNTER
Rony Anderson is requesting for a motion sickness bracelet or patch, please no tab/pills. The rx is needed by 4/6, because pt will be going on a cruise. Please send to the [facility]. Please advise Rony Anderson when the order is complete at [phone].

## 2018-03-21 NOTE — PROGRESS NOTES
HISTORY OF PRESENT ILLNESS  Sandro Sandoval is a 25 y.o. female. Patient presents today for back pain on the right side. HPI  Edward Teixeira states she is having kidney pain. She is also having vaginal itching and burning. Review of Systems   Constitutional: Negative. Respiratory: Negative. Cardiovascular: Negative. Gastrointestinal: Negative. Genitourinary: Positive for dysuria, flank pain and urgency. Visit Vitals    /77    Pulse 98    Temp 98.1 °F (36.7 °C)    Resp 18    Ht 5' 4\" (1.626 m)    Wt 213 lb (96.6 kg)    SpO2 97%    BMI 36.56 kg/m2       Physical Exam   Constitutional: She appears well-developed. No distress. Cardiovascular: Normal rate, regular rhythm and normal heart sounds. Pulmonary/Chest: Effort normal and breath sounds normal. No respiratory distress. She has no wheezes. She has no rales. She exhibits no tenderness. Abdominal: Soft. She exhibits no distension and no mass. There is tenderness (RUQ). There is CVA tenderness (right). There is no rebound and no guarding. ASSESSMENT and PLAN    ICD-10-CM ICD-9-CM    1. Acute right flank pain R10.9 789.09 AMB POC URINALYSIS DIP STICK AUTO W/ MICRO     338.19    2. Acute cystitis without hematuria N30.00 595.0 CULTURE, URINE      nitrofurantoin, macrocrystal-monohydrate, (MACROBID) 100 mg capsule      DISCONTINUED: nitrofurantoin, macrocrystal-monohydrate, (MACROBID) 100 mg capsule   3. Antibiotic-induced yeast infection B37.9 112.9 fluconazole (DIFLUCAN) 150 mg tablet    T36.95XA E930.9 DISCONTINUED: fluconazole (DIFLUCAN) 150 mg tablet     PLAN:  Forms completed. Pt advised to drink more water. Care giver is to call with any concerns. Care giver given after visit summary.

## 2018-03-21 NOTE — PATIENT INSTRUCTIONS

## 2018-03-22 DIAGNOSIS — T75.3XXA MOTION SICKNESS, INITIAL ENCOUNTER: Primary | ICD-10-CM

## 2018-03-22 RX ORDER — SCOLOPAMINE TRANSDERMAL SYSTEM 1 MG/1
1 PATCH, EXTENDED RELEASE TRANSDERMAL
Qty: 3 PATCH | Refills: 0 | Status: SHIPPED | OUTPATIENT
Start: 2018-03-22 | End: 2018-07-16

## 2018-03-23 LAB — BACTERIA UR CULT: NORMAL

## 2018-03-23 NOTE — PROGRESS NOTES
Please advise Care giver that the urine culture did not show a bacterial infection. I would like her to complete the antibiotic.

## 2018-05-02 ENCOUNTER — TELEPHONE (OUTPATIENT)
Dept: FAMILY MEDICINE CLINIC | Age: 23
End: 2018-05-02

## 2018-05-02 NOTE — TELEPHONE ENCOUNTER
[address] [place] [address],  [address] from [facility] called to request a written script for sunscreen to have on vacation. Please call Susie Maharaj at [phone] to  script.

## 2018-06-06 NOTE — PROGRESS NOTES
Chief Complaint   Patient presents with    Other     right side and back pain      1. Have you been to the ER, urgent care clinic since your last visit? Hospitalized since your last visit? No    2. Have you seen or consulted any other health care providers outside of the [address] [place] [address] since your last visit? Include any pap smears or colon screening.  No negative...

## 2018-07-11 RX ORDER — OMEGA-3 FATTY ACIDS/FISH OIL 340-1000MG
CAPSULE ORAL
Qty: 28 CAP | Refills: 11 | Status: SHIPPED | OUTPATIENT
Start: 2018-07-11 | End: 2018-07-16

## 2018-07-16 ENCOUNTER — HOSPITAL ENCOUNTER (EMERGENCY)
Age: 23
Discharge: HOME | End: 2018-07-16
Attending: EMERGENCY MEDICINE
Payer: MEDICAID

## 2018-07-16 VITALS
WEIGHT: 209 LBS | HEART RATE: 95 BPM | BODY MASS INDEX: 34.82 KG/M2 | RESPIRATION RATE: 16 BRPM | HEIGHT: 65 IN | OXYGEN SATURATION: 97 % | DIASTOLIC BLOOD PRESSURE: 64 MMHG | TEMPERATURE: 98.1 F | SYSTOLIC BLOOD PRESSURE: 118 MMHG

## 2018-07-16 DIAGNOSIS — L98.9 PERINEAL IRRITATION IN FEMALE: Primary | ICD-10-CM

## 2018-07-16 LAB
APPEARANCE UR: CLEAR
BILIRUB UR QL: NEGATIVE
COLOR UR: YELLOW
GLUCOSE UR STRIP.AUTO-MCNC: NEGATIVE MG/DL
HCG UR QL: NEGATIVE
HGB UR QL STRIP: NEGATIVE
KETONES UR QL STRIP.AUTO: NEGATIVE MG/DL
LEUKOCYTE ESTERASE UR QL STRIP.AUTO: NEGATIVE
NITRITE UR QL STRIP.AUTO: NEGATIVE
PH UR STRIP: 6 [PH] (ref 5–8)
PROT UR STRIP-MCNC: NEGATIVE MG/DL
SERVICE CMNT-IMP: NORMAL
SP GR UR REFRACTOMETRY: >1.03 (ref 1–1.03)
UROBILINOGEN UR QL STRIP.AUTO: 1 EU/DL (ref 0.2–1)
WET PREP GENITAL: NORMAL

## 2018-07-16 PROCEDURE — 81003 URINALYSIS AUTO W/O SCOPE: CPT | Performed by: NURSE PRACTITIONER

## 2018-07-16 PROCEDURE — 87210 SMEAR WET MOUNT SALINE/INK: CPT | Performed by: NURSE PRACTITIONER

## 2018-07-16 PROCEDURE — 87491 CHLMYD TRACH DNA AMP PROBE: CPT | Performed by: NURSE PRACTITIONER

## 2018-07-16 PROCEDURE — 99283 EMERGENCY DEPT VISIT LOW MDM: CPT

## 2018-07-16 PROCEDURE — 81025 URINE PREGNANCY TEST: CPT | Performed by: NURSE PRACTITIONER

## 2018-07-16 NOTE — ED NOTES
Patient up for discharge. Discharge results have been reviewed with patient by the Provider.  Patient has been counseled regarding her diagnosis, treatment, and plan. Patient and care giver verbally conveys understanding and agreement of the signs, symptoms, diagnosis, treatment and prognosis and additionally agrees to follow up as discussed.  Patient and care giver also agrees with the care-plan and conveys that all of her questions have been answered.  I have also provided discharge instructions for the patient by the Provider, that include: educational information regarding their diagnosis and treatment, and list of reasons why they would want to return to the ED prior to their follow-up appointment, should her condition change. Patient has been provided with education for proper emergency department utilization. Any and all prescriptions for medicationif any, including it's  name, dosage, action, frequency, and side effects have been reviewed with the patient. Patient and care giver verbally stated their discharge instructions/ discharge plan of care, follow up with PCP and specialists, in their own words,  and stated what their discharge medications are; including the name, actions. Encouraged to adhere to MD discharge instructions. Armband removed and shredded per policy. Encouraged to voice any concerns, and all concerns addressed. Patient discharged in stable condition with no apparent distress.
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
## 2018-07-17 LAB
C TRACH RRNA SPEC QL NAA+PROBE: NEGATIVE
N GONORRHOEA RRNA SPEC QL NAA+PROBE: NEGATIVE
SPECIMEN SOURCE: NORMAL

## 2018-08-10 ENCOUNTER — APPOINTMENT (OUTPATIENT)
Dept: GENERAL RADIOLOGY | Age: 23
End: 2018-08-10
Attending: EMERGENCY MEDICINE
Payer: MEDICAID

## 2018-08-10 ENCOUNTER — HOSPITAL ENCOUNTER (EMERGENCY)
Age: 23
Discharge: HOME | End: 2018-08-10
Attending: EMERGENCY MEDICINE
Payer: MEDICAID

## 2018-08-10 VITALS
WEIGHT: 209 LBS | BODY MASS INDEX: 34.82 KG/M2 | TEMPERATURE: 98 F | RESPIRATION RATE: 18 BRPM | DIASTOLIC BLOOD PRESSURE: 88 MMHG | HEART RATE: 93 BPM | OXYGEN SATURATION: 99 % | HEIGHT: 65 IN | SYSTOLIC BLOOD PRESSURE: 138 MMHG

## 2018-08-10 DIAGNOSIS — R55 NEAR SYNCOPE: Primary | ICD-10-CM

## 2018-08-10 LAB
ALBUMIN SERPL-MCNC: 3 G/DL (ref 3.4–5)
ALBUMIN/GLOB SERPL: 0.9 {RATIO} (ref 0.8–1.7)
ALP SERPL-CCNC: 144 U/L (ref 45–117)
ALT SERPL-CCNC: 30 U/L (ref 13–56)
ANION GAP SERPL CALC-SCNC: 9 MMOL/L (ref 3–18)
APPEARANCE UR: CLEAR
AST SERPL-CCNC: 23 U/L (ref 15–37)
ATRIAL RATE: 91 BPM
BASOPHILS # BLD: 0 K/UL (ref 0–0.1)
BASOPHILS NFR BLD: 1 % (ref 0–2)
BILIRUB SERPL-MCNC: 0.1 MG/DL (ref 0.2–1)
BILIRUB UR QL: NEGATIVE
BUN SERPL-MCNC: 11 MG/DL (ref 7–18)
BUN/CREAT SERPL: 17 (ref 12–20)
CALCIUM SERPL-MCNC: 8.9 MG/DL (ref 8.5–10.1)
CALCULATED P AXIS, ECG09: 53 DEGREES
CALCULATED R AXIS, ECG10: 66 DEGREES
CALCULATED T AXIS, ECG11: 65 DEGREES
CHLORIDE SERPL-SCNC: 101 MMOL/L (ref 100–108)
CK MB CFR SERPL CALC: NORMAL % (ref 0–4)
CK MB SERPL-MCNC: <1 NG/ML (ref 5–25)
CK SERPL-CCNC: 158 U/L (ref 26–192)
CO2 SERPL-SCNC: 26 MMOL/L (ref 21–32)
COLOR UR: YELLOW
CREAT SERPL-MCNC: 0.63 MG/DL (ref 0.6–1.3)
DIAGNOSIS, 93000: NORMAL
DIFFERENTIAL METHOD BLD: ABNORMAL
EOSINOPHIL # BLD: 0.2 K/UL (ref 0–0.4)
EOSINOPHIL NFR BLD: 2 % (ref 0–5)
EPITH CASTS URNS QL MICRO: NORMAL /LPF (ref 0–5)
ERYTHROCYTE [DISTWIDTH] IN BLOOD BY AUTOMATED COUNT: 12.4 % (ref 11.6–14.5)
GLOBULIN SER CALC-MCNC: 3.5 G/DL (ref 2–4)
GLUCOSE SERPL-MCNC: 101 MG/DL (ref 74–99)
GLUCOSE UR STRIP.AUTO-MCNC: NEGATIVE MG/DL
HCG UR QL: NEGATIVE
HCT VFR BLD AUTO: 42.9 % (ref 35–45)
HGB BLD-MCNC: 14.1 G/DL (ref 12–16)
HGB UR QL STRIP: NEGATIVE
KETONES UR QL STRIP.AUTO: NEGATIVE MG/DL
LEUKOCYTE ESTERASE UR QL STRIP.AUTO: ABNORMAL
LYMPHOCYTES # BLD: 1.7 K/UL (ref 0.9–3.6)
LYMPHOCYTES NFR BLD: 23 % (ref 21–52)
MCH RBC QN AUTO: 30.5 PG (ref 24–34)
MCHC RBC AUTO-ENTMCNC: 32.9 G/DL (ref 31–37)
MCV RBC AUTO: 92.9 FL (ref 74–97)
MONOCYTES # BLD: 0.5 K/UL (ref 0.05–1.2)
MONOCYTES NFR BLD: 7 % (ref 3–10)
NEUTS SEG # BLD: 4.8 K/UL (ref 1.8–8)
NEUTS SEG NFR BLD: 67 % (ref 40–73)
NITRITE UR QL STRIP.AUTO: NEGATIVE
P-R INTERVAL, ECG05: 168 MS
PH UR STRIP: 6.5 [PH] (ref 5–8)
PLATELET # BLD AUTO: 180 K/UL (ref 135–420)
PMV BLD AUTO: 12.1 FL (ref 9.2–11.8)
POTASSIUM SERPL-SCNC: 4.2 MMOL/L (ref 3.5–5.5)
PROT SERPL-MCNC: 6.5 G/DL (ref 6.4–8.2)
PROT UR STRIP-MCNC: NEGATIVE MG/DL
Q-T INTERVAL, ECG07: 354 MS
QRS DURATION, ECG06: 78 MS
QTC CALCULATION (BEZET), ECG08: 435 MS
RBC # BLD AUTO: 4.62 M/UL (ref 4.2–5.3)
RBC #/AREA URNS HPF: NORMAL /HPF (ref 0–5)
SODIUM SERPL-SCNC: 136 MMOL/L (ref 136–145)
SP GR UR REFRACTOMETRY: 1.02 (ref 1–1.03)
TROPONIN I SERPL-MCNC: <0.02 NG/ML (ref 0–0.06)
UROBILINOGEN UR QL STRIP.AUTO: 1 EU/DL (ref 0.2–1)
VENTRICULAR RATE, ECG03: 91 BPM
WBC # BLD AUTO: 7.2 K/UL (ref 4.6–13.2)
WBC URNS QL MICRO: NORMAL /HPF (ref 0–4)

## 2018-08-10 PROCEDURE — 81001 URINALYSIS AUTO W/SCOPE: CPT | Performed by: EMERGENCY MEDICINE

## 2018-08-10 PROCEDURE — 71045 X-RAY EXAM CHEST 1 VIEW: CPT

## 2018-08-10 PROCEDURE — 85025 COMPLETE CBC W/AUTO DIFF WBC: CPT | Performed by: EMERGENCY MEDICINE

## 2018-08-10 PROCEDURE — 99284 EMERGENCY DEPT VISIT MOD MDM: CPT

## 2018-08-10 PROCEDURE — 93005 ELECTROCARDIOGRAM TRACING: CPT

## 2018-08-10 PROCEDURE — 81025 URINE PREGNANCY TEST: CPT | Performed by: EMERGENCY MEDICINE

## 2018-08-10 PROCEDURE — 80053 COMPREHEN METABOLIC PANEL: CPT | Performed by: EMERGENCY MEDICINE

## 2018-08-10 PROCEDURE — 82550 ASSAY OF CK (CPK): CPT | Performed by: EMERGENCY MEDICINE

## 2018-08-10 NOTE — ED NOTES
Hal Hilario is a 25 y.o. female that was discharged in stable condition. The patients diagnosis, condition and treatment were explained to  patient and aftercare instructions were given. The patient verbalized understanding. Patient armband removed and shredded.

## 2018-08-10 NOTE — ED PROVIDER NOTES
EMERGENCY DEPARTMENT HISTORY AND PHYSICAL EXAM    9:57 AM      Date: 8/10/2018  Patient Name: Kirstin Palomares    History of Presenting Illness     Chief Complaint   Patient presents with    Syncope    Fall         History Provided By: Patient, EMS and Caregiver    Chief Complaint: Fall   Duration: \"this morning\" Hours  Timing:  Acute  Location: [place] to both knees   Quality: Aching  Severity: Moderate  Modifying Factors: Pt had an acute fall   Associated Symptoms: denies any other associated signs or symptoms      Additional History (Context): Kirstin Palomares is a 25 y.o. female with PMHx of ADHD, mild mental retardation, thromboembolus who presents via EMS for the evaluation of an acute fall that happened \"this morning\". Pt fell to both her knees resulting in aching moderate bilateral knee pain. Pt was walking outside when the fall occurred. Caregiver (Jett Brambila via phone call) states the pt \"went face down\" and hit the concrete ground. The caregiver was able to help the pt onto the curb and they used a wheelchair to take the pt into the building. Caregiver is uncertain if the pt fell or had a syncopal episode. Caregiver states that she had fallen in the past. States the pt ate breakfast. States pt has a Hx of blood clots. Denies any head trauma. Denies any further complaints or symptoms at the moment. PCP: Bull Castellanos NP    Current Outpatient Prescriptions   Medication Sig Dispense Refill    pravastatin (PRAVACHOL) 20 mg tablet Take 1 Tab by mouth nightly. Indications: hypercholesterolemia 90 Tab 1    haloperidol (HALDOL) 5 mg tablet   0    clindamycin-benzoyl peroxide (BENZACLIN) 1-5 % topical gel Apply  to affected area two (2) times a day. Apply to affected area after the skin has been cleansed and dried. 1 Tube 5    polyethylene glycol (MIRALAX) 17 gram/dose powder Take 17 g by mouth daily.  QUEtiapine (SEROQUEL) 50 mg tablet Take 50 mg by mouth nightly.       LORazepam (ATIVAN) 0.5 mg tablet Take 0.5 mg by mouth every eight (8) hours as needed for Anxiety.  docusate sodium (COLACE) 100 mg capsule Take 100 mg by mouth two (2) times a day.  OXcarbazepine (TRILEPTAL) 600 mg tablet Take 1 Tab by mouth two (2) times a day. Indications: mood stabilization 60 Tab 0       Past History     Past Medical History:  Past Medical History:   Diagnosis Date    ADHD (attention deficit hyperactivity disorder)     Bipolar disorder (HCC)     Borderline personality disorder     Mild mental retardation     PMS (premenstrual syndrome)     Thromboembolus ([address] 2013    PE       Past Surgical History:  History reviewed. No pertinent surgical history. Family History:  History reviewed. No pertinent family history. Social History:  Social History   Substance Use Topics    Smoking status: Never Smoker    Smokeless tobacco: Never Used    Alcohol use Yes       Allergies: Allergies   Allergen Reactions    Other Medication Unknown (comments)     Birth control pills         Review of Systems       Review of Systems   Constitutional: Negative for chills, fatigue and fever. HENT: Negative. Negative for sore throat. Eyes: Negative. Respiratory: Negative for cough and shortness of breath. Cardiovascular: Negative for chest pain and palpitations. Gastrointestinal: Negative for abdominal pain, nausea and vomiting. Genitourinary: Negative for dysuria. Musculoskeletal: Negative. Negative for myalgias. Positive for knee pain    Skin: Negative. Neurological: Negative for dizziness, weakness, light-headedness and headaches. Psychiatric/Behavioral: Negative. All other systems reviewed and are negative.         Physical Exam     Visit Vitals    /88 (BP 1 Location: Left arm, BP Patient Position: At rest;Sitting)    Pulse 93    Temp 98 °F (36.7 °C)    Resp 18    Ht 5' 5\" (1.651 m)    Wt 94.8 kg (209 lb)    SpO2 99%    BMI 34.78 kg/m2         Physical Exam   Constitutional: She appears well-developed and well-nourished. Non-toxic appearance. She does not have a sickly appearance. She does not appear ill. No distress. HENT:   Head: Normocephalic and atraumatic. Mouth/Throat: Oropharynx is clear and moist. No oropharyngeal exudate. Eyes: Conjunctivae and EOM are normal. Pupils are equal, round, and reactive to light. No scleral icterus. Neck: Normal range of motion. Neck supple. No hepatojugular reflux and no JVD present. No tracheal deviation present. No thyromegaly present. Cardiovascular: Normal rate, regular rhythm, S1 normal, S2 normal, normal heart sounds, intact distal pulses and normal pulses. Exam reveals no gallop, no S3 and no S4. No murmur heard. Pulses:       Radial pulses are 2+ on the right side, and 2+ on the left side. Dorsalis pedis pulses are 2+ on the right side, and 2+ on the left side. Pulmonary/Chest: Effort normal and breath sounds normal. No respiratory distress. She has no decreased breath sounds. She has no wheezes. She has no rhonchi. She has no rales. Abdominal: Soft. Normal appearance and bowel sounds are normal. She exhibits no distension and no mass. There is no hepatosplenomegaly. There is no tenderness. There is no rigidity, no rebound, no guarding, no CVA tenderness, no tenderness at McBurney's point and negative Rivas's sign. Musculoskeletal: Normal range of motion. Lymphadenopathy:        Head (right side): No submental, no submandibular, no preauricular and no occipital adenopathy present. Head (left side): No submental, no submandibular, no preauricular and no occipital adenopathy present. She has no cervical adenopathy. Right: No supraclavicular adenopathy present. Left: No supraclavicular adenopathy present. Neurological: She is alert. She has normal strength and normal reflexes. She is not disoriented. No cranial nerve deficit or sensory deficit.  Coordination and gait normal. GCS eye subscore is 4. GCS verbal subscore is 5. GCS motor subscore is 6. Skin: Skin is warm, dry and intact. No rash noted. She is not diaphoretic. Psychiatric: Her behavior is normal. Judgment and thought content normal.   Mild MR   Nursing note and vitals reviewed. Diagnostic Study Results     Labs -  Recent Results (from the past 12 hour(s))   HCG URINE, QL    Collection Time: 08/10/18  9:40 AM   Result Value Ref Range    HCG urine, QL NEGATIVE  NEG     URINALYSIS W/ RFLX MICROSCOPIC    Collection Time: 08/10/18  9:40 AM   Result Value Ref Range    Color YELLOW      Appearance CLEAR      Specific gravity 1.021 1.005 - 1.030      pH (UA) 6.5 5.0 - 8.0      Protein NEGATIVE  NEG mg/dL    Glucose NEGATIVE  NEG mg/dL    Ketone NEGATIVE  NEG mg/dL    Bilirubin NEGATIVE  NEG      Blood NEGATIVE  NEG      Urobilinogen 1.0 0.2 - 1.0 EU/dL    Nitrites NEGATIVE  NEG      Leukocyte Esterase TRACE (A) NEG     URINE MICROSCOPIC ONLY    Collection Time: 08/10/18  9:40 AM   Result Value Ref Range    WBC 0 to 3 0 - 4 /hpf    RBC NONE 0 - 5 /hpf    Epithelial cells 1+ 0 - 5 /lpf   CBC WITH AUTOMATED DIFF    Collection Time: 08/10/18  9:50 AM   Result Value Ref Range    WBC 7.2 4.6 - 13.2 K/uL    RBC 4.62 4.20 - 5.30 M/uL    HGB 14.1 12.0 - 16.0 g/dL    HCT 42.9 35.0 - 45.0 %    MCV 92.9 74.0 - 97.0 FL    MCH 30.5 24.0 - 34.0 PG    MCHC 32.9 31.0 - 37.0 g/dL    RDW 12.4 11.6 - 14.5 %    PLATELET 547 978 - 936 K/uL    MPV 12.1 (H) 9.2 - 11.8 FL    NEUTROPHILS 67 40 - 73 %    LYMPHOCYTES 23 21 - 52 %    MONOCYTES 7 3 - 10 %    EOSINOPHILS 2 0 - 5 %    BASOPHILS 1 0 - 2 %    ABS. NEUTROPHILS 4.8 1.8 - 8.0 K/UL    ABS. LYMPHOCYTES 1.7 0.9 - 3.6 K/UL    ABS. MONOCYTES 0.5 0.05 - 1.2 K/UL    ABS. EOSINOPHILS 0.2 0.0 - 0.4 K/UL    ABS.  BASOPHILS 0.0 0.0 - 0.1 K/UL    DF AUTOMATED     METABOLIC PANEL, COMPREHENSIVE    Collection Time: 08/10/18  9:50 AM   Result Value Ref Range    Sodium 136 136 - 145 mmol/L    Potassium 4.2 3.5 - 5.5 mmol/L Chloride 101 100 - 108 mmol/L    CO2 26 21 - 32 mmol/L    Anion gap 9 3.0 - 18 mmol/L    Glucose 101 (H) 74 - 99 mg/dL    BUN 11 7.0 - 18 MG/DL    Creatinine 0.63 0.6 - 1.3 MG/DL    BUN/Creatinine ratio 17 12 - 20      GFR est AA >60 >60 ml/min/1.73m2    GFR est non-AA >60 >60 ml/min/1.73m2    Calcium 8.9 8.5 - 10.1 MG/DL    Bilirubin, total 0.1 (L) 0.2 - 1.0 MG/DL    ALT (SGPT) 30 13 - 56 U/L    AST (SGOT) 23 15 - 37 U/L    Alk. phosphatase 144 (H) 45 - 117 U/L    Protein, total 6.5 6.4 - 8.2 g/dL    Albumin 3.0 (L) 3.4 - 5.0 g/dL    Globulin 3.5 2.0 - 4.0 g/dL    A-G Ratio 0.9 0.8 - 1.7     CARDIAC PANEL,(CK, CKMB & TROPONIN)    Collection Time: 08/10/18  9:50 AM   Result Value Ref Range     26 - 192 U/L    CK - MB <1.0 <3.6 ng/ml    CK-MB Index  0.0 - 4.0 %     CALCULATION NOT PERFORMED WHEN RESULT IS BELOW LINEAR LIMIT    Troponin-I, Qt. <0.02 0.00 - 0.06 NG/ML   EKG, 12 LEAD, INITIAL    Collection Time: 08/10/18  9:51 AM   Result Value Ref Range    Ventricular Rate 91 BPM    Atrial Rate 91 BPM    P-R Interval 168 ms    QRS Duration 78 ms    Q-T Interval 354 ms    QTC Calculation (Bezet) 435 ms    Calculated P Axis 53 degrees    Calculated R Axis 66 degrees    Calculated T Axis 65 degrees    Diagnosis       Normal sinus rhythm  Normal ECG  No previous ECGs available         Radiologic Studies -   XR CHEST PORT   Final Result      Impression:  1.  No acute process      Medical Decision Making   Provider Notes (Medical Decision Making):  MDM  Number of Diagnoses or Management Options  Near syncope:       I am the first provider for this patient. I reviewed the vital signs, available nursing notes, past medical history, past surgical history, family history and social history. Vital Signs-Reviewed the patient's vital signs. Records Reviewed: Nursing Notes (Time of Review: 9:57 AM)    ED Course: Progress Notes, Reevaluation, and Consults:    Labs essentially normal. Cardiac enzymes negative.  Pregnancy negative. UA negative. Chest X-Ray showed No acute process. EKG showed NSR with rate of 81 bpm. With no ST elevations or depression and non specific T wave changes. 9:57 AM 8/10/2018      Diagnosis       I have reassessed the patient. Patient is feeling well. Patient was discharged in stable condition. Patient is to return to emergency department if any new or worsening condition. Clinical Impression:   1. Near syncope        Disposition: DC     Follow-up Information     Follow up With Details Comments Contact Info    Montana Cuenca NP Schedule an appointment as soon as possible for a visit For [address] [place] [address]  [address]  [address]  [phone] [address] [facility] EMERGENCY DEPT Go to As needed, If symptoms worsen [address]  [phone]           _______________________________    Attestations:  Lv Cross (Aj) acting as a scribe for and in the presence of Erin Rodriguez DO      August 10, 2018 at 9:57 AM       Provider Attestation:      I personally performed the services described in the documentation, reviewed the documentation, as recorded by the scribe in my presence, and it accurately and completely records my words and actions.  August 10, 2018 at 9:57 AM - Hugo House DO    _______________________________

## 2018-08-10 NOTE — DISCHARGE INSTRUCTIONS

## 2018-08-10 NOTE — ED TRIAGE NOTES
Pt had a possible syncopal episode with an abrasion to bilateral knees. Pt with left forearm with abrasion. Undetermined if patient actually fainted for if she tripped and fell. She has abrasions on hands. Patient states she feels dizzy.

## 2018-09-18 ENCOUNTER — OFFICE VISIT (OUTPATIENT)
Dept: FAMILY MEDICINE CLINIC | Age: 23
End: 2018-09-18

## 2018-09-18 VITALS
HEIGHT: 65 IN | OXYGEN SATURATION: 99 % | WEIGHT: 211.8 LBS | HEART RATE: 111 BPM | BODY MASS INDEX: 35.29 KG/M2 | DIASTOLIC BLOOD PRESSURE: 74 MMHG | RESPIRATION RATE: 19 BRPM | SYSTOLIC BLOOD PRESSURE: 122 MMHG | TEMPERATURE: 98.2 F

## 2018-09-18 DIAGNOSIS — J02.9 PHARYNGITIS, UNSPECIFIED ETIOLOGY: ICD-10-CM

## 2018-09-18 DIAGNOSIS — J02.9 SORE THROAT: Primary | ICD-10-CM

## 2018-09-18 PROBLEM — E66.01 SEVERE OBESITY (BMI 35.0-39.9): Status: ACTIVE | Noted: 2018-09-18

## 2018-09-18 LAB
S PYO AG THROAT QL: NORMAL
VALID INTERNAL CONTROL?: YES

## 2018-09-18 RX ORDER — OXCARBAZEPINE 300 MG/1
900 TABLET, FILM COATED ORAL
COMMUNITY
Start: 2018-08-31 | End: 2019-09-16

## 2018-09-18 RX ORDER — ESCITALOPRAM OXALATE 20 MG/1
20 TABLET ORAL DAILY
COMMUNITY
Start: 2018-08-31 | End: 2019-07-08 | Stop reason: SDUPTHER

## 2018-09-18 RX ORDER — OMEGA-3 FATTY ACIDS/FISH OIL 340-1000MG
1000 CAPSULE ORAL DAILY
COMMUNITY
Start: 2018-08-31 | End: 2019-04-22

## 2018-09-18 RX ORDER — LORAZEPAM 1 MG/1
2 TABLET ORAL 3 TIMES DAILY
COMMUNITY
Start: 2018-09-17 | End: 2019-09-16

## 2018-09-18 NOTE — PROGRESS NOTES
Chief Complaint   Patient presents with    Sore Throat     dry cough for a couple days     1. Have you been to the ER, urgent care clinic since your last visit? Hospitalized since your last visit? No     2. Have you seen or consulted any other health care providers outside of the [address] [place] [address] since your last visit? Include any pap smears or colon screening.   No

## 2018-09-18 NOTE — PATIENT INSTRUCTIONS
Sore Throat: Care Instructions  Your Care Instructions    Infection by bacteria or a virus causes most sore throats. Cigarette smoke, dry air, air pollution, allergies, and yelling can also cause a sore throat. Sore throats can be painful and annoying. Fortunately, most sore throats go away on their own. If you have a bacterial infection, your doctor may prescribe antibiotics. Follow-up care is a key part of your treatment and safety. Be sure to make and go to all appointments, and call your doctor if you are having problems. It's also a good idea to know your test results and keep a list of the medicines you take. How can you care for yourself at home? · If your doctor prescribed antibiotics, take them as directed. Do not stop taking them just because you feel better. You need to take the full course of antibiotics. · Gargle with warm salt water once an hour to help reduce swelling and relieve discomfort. Use 1 teaspoon of salt mixed in 1 cup of warm water. · Take an over-the-counter pain medicine, such as acetaminophen (Tylenol), ibuprofen (Advil, Motrin), or naproxen (Aleve). Read and follow all instructions on the label. · Be careful when taking over-the-counter cold or flu medicines and Tylenol at the same time. Many of these medicines have acetaminophen, which is Tylenol. Read the labels to make sure that you are not taking more than the recommended dose. Too much acetaminophen (Tylenol) can be harmful. · Drink plenty of fluids. Fluids may help soothe an irritated throat. Hot fluids, such as tea or soup, may help decrease throat pain. · Use over-the-counter throat lozenges to soothe pain. Regular cough drops or hard candy may also help. These should not be given to young children because of the risk of choking. · Do not smoke or allow others to smoke around you. If you need help quitting, talk to your doctor about stop-smoking programs and medicines.  These can increase your chances of quitting for good. · Use a vaporizer or humidifier to add moisture to your bedroom. Follow the directions for cleaning the machine. When should you call for help? Call your doctor now or seek immediate medical care if:    · You have new or worse trouble swallowing.     · Your sore throat gets much worse on one side.    Watch closely for changes in your health, and be sure to contact your doctor if you do not get better as expected. Where can you learn more? Go to http://paty-jennifer.info/. Enter 062 [id number] in the search box to learn more about \"Sore Throat: Care Instructions. \"  Current as of: May 12, 2017  Content Version: 11.7  © 6881-4813 [employer]. Care instructions adapted under license by "[employer] (which disclaims liability or warranty for this information). If you have questions about a medical condition or this instruction, always ask your healthcare professional. [address] any warranty or liability for your use of this information.

## 2018-09-18 NOTE — MR AVS SNAPSHOT
[facility] 
 
 
 [address] [address] [address] [facility] [address] 
[phone] Patient: Fidencio Linares MRN: [id number] Visit Information Date & Time Provider Department Dept. Phone Encounter #  
 9/18/2018  9:00 AM Manpreet Dwyer NP [facility] [phone] [id number] Follow-up Instructions Return if symptoms worsen or fail to improve. Upcoming Health Maintenance Date Due  
 HPV Age 9Y-34Y (1 of 1 - Female 3 Dose Series) 12/30/2007 DTaP/Tdap/Td series (1 - Tdap) 12/30/2017 Influenza Age 5 to Adult 8/1/2018 PAP AKA CERVICAL CYTOLOGY 2/14/2021 Allergies as of 9/18/2018  Review Complete On: 9/18/2018 By: Manpreet Dwyer NP Severity Noted Reaction Type Reactions Other Medication  07/16/2018    Unknown (comments) Birth control pills Current Immunizations  Never Reviewed Name Date Influenza Vaccine (Quad) PF 9/18/2017 TB Skin Test (PPD) Intradermal 9/18/2017 Not reviewed this visit You Were Diagnosed With   
  
 Codes Comments Sore throat    -  Primary ICD-10-CM: J02.9 ICD-9-CM: 370 Pharyngitis, unspecified etiology     ICD-10-CM: J02.9 ICD-9-CM: 745 Vitals BP Pulse Temp Resp Height(growth percentile) Weight(growth percentile) 122/74 (BP 1 Location: Right arm, BP Patient Position: Sitting) (!) 111 98.2 °F (36.8 °C) (Oral) 19 5' 5\" (1.651 m) 211 lb 12.8 oz (96.1 kg) SpO2 BMI OB Status Smoking Status 99% 35.25 kg/m2 Implant Never Smoker BMI and BSA Data Body Mass Index Body Surface Area  
 35.25 kg/m 2 2.1 m 2 Preferred Pharmacy Pharmacy Name Phone [address] [address]. [phone] Your Updated Medication List  
  
   
This list is accurate as of 9/18/18 10:15 AM.  Always use your most recent med list.  
  
  
  
  
 clindamycin-benzoyl peroxide 1-5 % topical gel Commonly known as:  Shahida Cooler Apply  to affected area two (2) times a day. Apply to affected area after the skin has been cleansed and dried. docusate sodium 100 mg capsule Commonly known as:  Sander Carrington Take 100 mg by mouth two (2) times a day. escitalopram oxalate 20 mg tablet Commonly known as:  Valentine Berg Take 20 mg by mouth daily. [facility] FISH -1,000 mg capsule Generic drug:  fish oil-omega-3 fatty acids Take 1,000 mg by mouth daily. haloperidol 5 mg tablet Commonly known as:  HALDOL  
  
 LATUDA 40 mg Tab tablet Generic drug:  lurasidone Take  by mouth. LORazepam 1 mg tablet Commonly known as:  ATIVAN Take 1 mg by mouth three (3) times daily. Prescribed by [facility] MIRALAX 17 gram/dose powder Generic drug:  polyethylene glycol Take 17 g by mouth daily. * OXcarbaxepine 600 mg tablet Commonly known as:  TRILEPTAL Take 1 Tab by mouth two (2) times a day. Indications: mood stabilization * OXcarbazepine 300 mg tablet Commonly known as:  TRILEPTAL Take 900 mg by mouth nightly. [facility]  
  
 pravastatin 20 mg tablet Commonly known as:  PRAVACHOL  
ONE TABLET BY MOUTH AT BEDTIME -  HYPERCHOLESTEROLEMIA SEROquel 50 mg tablet Generic drug:  QUEtiapine Take 50 mg by mouth nightly. * Notice: This list has 2 medication(s) that are the same as other medications prescribed for you. Read the directions carefully, and ask your doctor or other care provider to review them with you. We Performed the Following AMB POC RAPID STREP A [16949 CPT(R)] Follow-up Instructions Return if symptoms worsen or fail to improve. To-Do List   
 09/18/2018 Microbiology:  THROAT CULTURE Patient Instructions Sore Throat: Care Instructions Your Care Instructions Infection by bacteria or a virus causes most sore throats.  Cigarette smoke, dry air, air pollution, allergies, and yelling can also cause a sore throat. Sore throats can be painful and annoying. Fortunately, most sore throats go away on their own. If you have a bacterial infection, your doctor may prescribe antibiotics. Follow-up care is a key part of your treatment and safety. Be sure to make and go to all appointments, and call your doctor if you are having problems. It's also a good idea to know your test results and keep a list of the medicines you take. How can you care for yourself at home? · If your doctor prescribed antibiotics, take them as directed. Do not stop taking them just because you feel better. You need to take the full course of antibiotics. · Gargle with warm salt water once an hour to help reduce swelling and relieve discomfort. Use 1 teaspoon of salt mixed in 1 cup of warm water. · Take an over-the-counter pain medicine, such as acetaminophen (Tylenol), ibuprofen (Advil, Motrin), or naproxen (Aleve). Read and follow all instructions on the label. · Be careful when taking over-the-counter cold or flu medicines and Tylenol at the same time. Many of these medicines have acetaminophen, which is Tylenol. Read the labels to make sure that you are not taking more than the recommended dose. Too much acetaminophen (Tylenol) can be harmful. · Drink plenty of fluids. Fluids may help soothe an irritated throat. Hot fluids, such as tea or soup, may help decrease throat pain. · Use over-the-counter throat lozenges to soothe pain. Regular cough drops or hard candy may also help. These should not be given to young children because of the risk of choking. · Do not smoke or allow others to smoke around you. If you need help quitting, talk to your doctor about stop-smoking programs and medicines. These can increase your chances of quitting for good. · Use a vaporizer or humidifier to add moisture to your bedroom. Follow the directions for cleaning the machine. When should you call for help? Call your doctor now or seek immediate medical care if: 
  · You have new or worse trouble swallowing.  
  · Your sore throat gets much worse on one side.  
 Watch closely for changes in your health, and be sure to contact your doctor if you do not get better as expected. Where can you learn more? Go to http://paty-jennifer.info/. Enter [id number] [id number] in the search box to learn more about \"Sore Throat: Care Instructions. \" Current as of: May 12, 2017 Content Version: 11.7 © 9317-4352 [employer]. Care instructions adapted under license by [employer] (which disclaims liability or warranty for this information). If you have questions about a medical condition or this instruction, always ask your healthcare professional. [address] any warranty or liability for your use of this information. Introducing [facility]! Dear [facility]: Thank you for requesting a [employer] account. Our records indicate that you already have an active [employer] account. You can access your account anytime at [email or web address] Did you know that you can access your hospital and ER discharge instructions at any time in [employer]? You can also review all of your test results from your hospital stay or ER visit. Additional Information If you have questions, please visit the Frequently Asked Questions section of the [employer] website at [email or web address] Remember, [employer] is NOT to be used for urgent needs. For medical emergencies, dial 911. Now available from your iPhone and Android! Please provide this summary of care documentation to your next provider. Your primary care clinician is listed as Justin Laureano. If you have any questions after today's visit, please call [phone].

## 2018-09-18 NOTE — PROGRESS NOTES
Sore Throat    The history is provided by the patient Adam Velazquez caregiver). This is a new problem. The current episode started more than 2 days ago. The problem has been gradually worsening. There has been no fever. Pertinent negatives include no congestion, no ear discharge and no ear pain. Treatments tried: chloroseptic spray prn. The treatment provided moderate relief. Review of Systems   Constitutional: Negative. HENT: Positive for sore throat. Negative for congestion, ear discharge, ear pain and sinus pain. Respiratory: Negative. Cardiovascular: Negative. Skin: Negative. PMH: reviewed medications and allergy lists and medical and family history. OBJECTIVE:  Awake and alert in no acute distress  HEENT: nares patent with erythema, boggy, clear secretions bilaterally. TMs retracted bilaterally, pharynx with erythema, neck supple with anterior cervical lymphadenopathy. No tenderness to palpation over sinus passages bilaterally  Lungs clear throughout  S1 S2 RRR without ectopy or murmur auscultated. Integumentary: no rashes  Normal skin turgor  Visit Vitals    /74 (BP 1 Location: Right arm, BP Patient Position: Sitting)    Pulse (!) 111    Temp 98.2 °F (36.8 °C) (Oral)    Resp 19    Ht 5' 5\" (1.651 m)    Wt 211 lb 12.8 oz (96.1 kg)    SpO2 99%    BMI 35.25 kg/m2     Diagnoses and all orders for this visit:    1. Sore throat  -     AMB POC RAPID STREP A  -     THROAT CULTURE; Future    2. Pharyngitis, unspecified etiology  -     AMB POC RAPID STREP A  -     THROAT CULTURE; Future    General comfort measures  Caregiver verbalizes understanding  Paperwork completed   I have discussed the diagnosis with the patient and the intended plan as seen in the above orders. The patient has received an after-visit summary and questions were answered concerning future plans. I have discussed medication side effects and warnings with the patient as well.     Follow-up Disposition:  Return if symptoms worsen or fail to improve. Time with Pt 15 minutes, >50% of which was counseling pt regarding Dx and Tx options sore throat  and coordination of care. Jeaneth Cabrera

## 2018-09-21 LAB — BACTERIA SPEC RESP CULT: NORMAL

## 2018-10-08 ENCOUNTER — CLINICAL SUPPORT (OUTPATIENT)
Dept: FAMILY MEDICINE CLINIC | Age: 23
End: 2018-10-08

## 2018-10-08 DIAGNOSIS — Z23 ENCOUNTER FOR IMMUNIZATION: ICD-10-CM

## 2018-10-08 DIAGNOSIS — Z11.1 SCREENING EXAMINATION FOR PULMONARY TUBERCULOSIS: ICD-10-CM

## 2018-10-08 NOTE — MR AVS SNAPSHOT
[facility] 
 
 
 [address] [address] [address] [address] 
[phone] Patient: Shahid Baxter MRN: [id number] Visit Information Date & Time Provider Department Dept. Phone Encounter #  
 10/8/2018 10:00 AM Sherry Salmeron NP [place] [address] [place] [address] [id number] Follow-up Instructions Return in about 2 days (around 10/10/2018) for PPD reading. Follow-up and Disposition History Upcoming Health Maintenance Date Due  
 HPV Age 9Y-34Y (1 of 1 - Female 3 Dose Series) 12/30/2007 DTaP/Tdap/Td series (1 - Tdap) 12/30/2017 Influenza Age 5 to Adult 10/18/2018* PAP AKA CERVICAL CYTOLOGY 2/14/2021 *Topic was postponed. The date shown is not the original due date. Allergies as of 10/8/2018  Review Complete On: 10/8/2018 By: Bishop Reynaldo LPN Severity Noted Reaction Type Reactions Other Medication  07/16/2018    Unknown (comments) Birth control pills Current Immunizations  Never Reviewed Name Date Influenza Vaccine (Quad) PF 9/18/2017 TB Skin Test (PPD) Intradermal 10/8/2018, 9/18/2017 Not reviewed this visit You Were Diagnosed With   
  
 Codes Comments Encounter for immunization     ICD-10-CM: A23 ICD-9-CM: V03.89 Screening examination for pulmonary tuberculosis     ICD-10-CM: Z11.1 ICD-9-CM: V74.1 Vitals OB Status Smoking Status Implant Never Smoker Preferred Pharmacy Pharmacy Name Phone [facility]. [phone] Your Updated Medication List  
  
   
This list is accurate as of 10/8/18 10:51 AM.  Always use your most recent med list.  
  
  
  
  
 clindamycin-benzoyl peroxide 1-5 % topical gel Commonly known as:  Anupam Mercedesner Apply  to affected area two (2) times a day. Apply to affected area after the skin has been cleansed and dried. docusate sodium 100 mg capsule Commonly known as:  Cammie Burn Take 100 mg by mouth two (2) times a day. escitalopram oxalate 20 mg tablet Commonly known as:  Becerril Penning Take 20 mg by mouth daily. [facility] FISH -1,000 mg capsule Generic drug:  fish oil-omega-3 fatty acids Take 1,000 mg by mouth daily. haloperidol 5 mg tablet Commonly known as:  HALDOL  
  
 LATUDA 40 mg Tab tablet Generic drug:  lurasidone Take  by mouth. LORazepam 1 mg tablet Commonly known as:  ATIVAN Take 1 mg by mouth three (3) times daily. Prescribed by [facility] MIRALAX 17 gram/dose powder Generic drug:  polyethylene glycol Take 17 g by mouth daily. * OXcarbaxepine 600 mg tablet Commonly known as:  TRILEPTAL Take 1 Tab by mouth two (2) times a day. Indications: mood stabilization * OXcarbazepine 300 mg tablet Commonly known as:  TRILEPTAL Take 900 mg by mouth nightly. [facility]  
  
 pravastatin 20 mg tablet Commonly known as:  PRAVACHOL  
ONE TABLET BY MOUTH AT BEDTIME -  HYPERCHOLESTEROLEMIA SEROquel 50 mg tablet Generic drug:  QUEtiapine Take 50 mg by mouth nightly. * Notice: This list has 2 medication(s) that are the same as other medications prescribed for you. Read the directions carefully, and ask your doctor or other care provider to review them with you. We Performed the Following AMB POC TUBERCULOSIS, INTRADERMAL (SKIN TEST) [58155 CPT(R)] Follow-up Instructions Return in about 2 days (around 10/10/2018) for PPD reading. Introducing [facility] & HEALTH SERVICES! Dear Wing Mean: Thank you for requesting a "[employer] account. Our records indicate that you already have an active "[employer] account. You can access your account anytime at [email or web address] Did you know that you can access your hospital and ER discharge instructions at any time in [employer]? You can also review all of your test results from your hospital stay or ER visit. Additional Information If you have questions, please visit the Frequently Asked Questions section of the [employer] website at [email or web address] Remember, [employer] is NOT to be used for urgent needs. For medical emergencies, dial 911. Now available from your iPhone and Android! Please provide this summary of care documentation to your next provider. Your primary care clinician is listed as Javed Ulrich. If you have any questions after today's visit, please call [phone].

## 2018-10-08 NOTE — LETTER
10/10/2018 5:11 PM 
 
Ms. Staton Likens [address] [place] [phone] Ms. Kim Annette, Patient presented for a PPD reading. Result:  Negative Induration 0 mm Any questions, please do not hesitate to contact the office at [phone].  
 
Sincerely, 
 
 
Nieves Teran LPN

## 2018-10-08 NOTE — PROGRESS NOTES
PPD Placement note  Hugo Jones, 25 y.o. female is here today for placement of PPD test  Reason for PPD test: Group home  Pt taken PPD test before: yes  Verified in allergy area and with patient that they are not allergic to the products PPD is made of (Phenol or Tween). Yes  Is patient taking any oral or IV steroid medication now or have they taken it in the last month? no  Has the patient ever received the BCG vaccine?: no  Has the patient been in recent contact with anyone known or suspected of having active TB disease?: no       Date of exposure (if applicable): n/a       Name of person they were exposed to (if applicable): n/a  Patient's Country of origin?: [place]  O: Alert and oriented in NAD. P:  PPD placed on 10/8/2018. Placed in right forearm at 10:10 am Patient tolerated well no adverse reaction noted. Patient advised to return for reading within 48-72 hours.

## 2018-10-10 NOTE — PROGRESS NOTES
Patient presented for a PPD reading. Result negative, Induration 0 mm. Patient received letter stating this and left office with no complaints of pain or distress at this time.

## 2018-10-15 ENCOUNTER — OFFICE VISIT (OUTPATIENT)
Dept: FAMILY MEDICINE CLINIC | Age: 23
End: 2018-10-15

## 2018-10-15 VITALS
OXYGEN SATURATION: 95 % | SYSTOLIC BLOOD PRESSURE: 117 MMHG | HEIGHT: 65 IN | WEIGHT: 215 LBS | HEART RATE: 103 BPM | BODY MASS INDEX: 35.82 KG/M2 | DIASTOLIC BLOOD PRESSURE: 75 MMHG | RESPIRATION RATE: 18 BRPM | TEMPERATURE: 97.8 F

## 2018-10-15 DIAGNOSIS — R12 HEARTBURN: Primary | ICD-10-CM

## 2018-10-15 RX ORDER — PANTOPRAZOLE SODIUM 40 MG/1
40 TABLET, DELAYED RELEASE ORAL DAILY
Qty: 30 TAB | Refills: 0 | Status: SHIPPED | OUTPATIENT
Start: 2018-10-15 | End: 2018-10-15 | Stop reason: SDUPTHER

## 2018-10-15 RX ORDER — OMEGA-3 FATTY ACIDS/FISH OIL 300-1000MG
CAPSULE ORAL
COMMUNITY
Start: 2018-10-01 | End: 2019-04-22 | Stop reason: SDUPTHER

## 2018-10-15 RX ORDER — PANTOPRAZOLE SODIUM 40 MG/1
40 TABLET, DELAYED RELEASE ORAL DAILY
Qty: 30 TAB | Refills: 0 | Status: SHIPPED | OUTPATIENT
Start: 2018-10-15 | End: 2018-11-29 | Stop reason: SDUPTHER

## 2018-10-15 NOTE — MR AVS SNAPSHOT
[address] [facility] 
 
 
 [address] [address] 
[phone] Patient: Deanne Cool MRN: [id number] [place]:[birth year] Visit Information Date & Time Provider Department Dept. Phone Encounter #  
 10/15/2018  4:00  [address] [facility] [phone] [id number] Upcoming Health Maintenance Date Due  
 HPV Age 9Y-34Y (1 of 1 - Female 3 Dose Series) 12/30/2007 DTaP/Tdap/Td series (1 - Tdap) 12/30/2017 Influenza Age 5 to Adult 10/18/2018* PAP AKA CERVICAL CYTOLOGY 2/14/2021 *Topic was postponed. The date shown is not the original due date. Allergies as of 10/15/2018  Review Complete On: 10/15/2018 By: Helen Norris LPN Severity Noted Reaction Type Reactions Other Medication  07/16/2018    Unknown (comments) Birth control pills Current Immunizations  Never Reviewed Name Date Influenza Vaccine (Quad) PF 9/18/2017 TB Skin Test (PPD) Intradermal 10/8/2018 10:10 AM, 9/18/2017 Not reviewed this visit You Were Diagnosed With   
  
 Codes Comments Heartburn    -  Primary ICD-10-CM: R12 
ICD-9-CM: 787.1 Vitals BP Pulse Temp Resp Height(growth percentile) Weight(growth percentile) 117/75 (BP 1 Location: Left arm, BP Patient Position: Sitting) (!) 103 97.8 °F (36.6 °C) (Oral) 18 5' 5\" (1.651 m) 215 lb (97.5 kg) SpO2 BMI OB Status Smoking Status 95% 35.78 kg/m2 Implant Never Smoker BMI and BSA Data Body Mass Index Body Surface Area 35.78 kg/m 2 2.11 m 2 Preferred Pharmacy Pharmacy Name Phone 200 [facility]. . [phone] Your Updated Medication List  
  
   
This list is accurate as of 10/15/18  4:12 PM.  Always use your most recent med list.  
  
  
  
  
 clindamycin-benzoyl peroxide 1-5 % topical gel Commonly known as:  Tylene Lanes  
 Apply  to affected area two (2) times a day. Apply to affected area after the skin has been cleansed and dried. docusate sodium 100 mg capsule Commonly known as:  Kaleb Satchel Take 100 mg by mouth two (2) times a day. escitalopram oxalate 20 mg tablet Commonly known as:  Arleta Abed Take 20 mg by mouth daily. [facility] FISH -1,000 mg capsule Generic drug:  fish oil-omega-3 fatty acids Take 1,000 mg by mouth daily. haloperidol 5 mg tablet Commonly known as:  HALDOL  
  
 LATUDA 40 mg Tab tablet Generic drug:  lurasidone Take  by mouth. LORazepam 1 mg tablet Commonly known as:  ATIVAN Take 1 mg by mouth three (3) times daily. Prescribed by [facility] MIRALAX 17 gram/dose powder Generic drug:  polyethylene glycol Take 17 g by mouth daily. Omega-3-DHA-EPA-Fish Oil 300-1,000 mg Cap * OXcarbaxepine 600 mg tablet Commonly known as:  TRILEPTAL Take 1 Tab by mouth two (2) times a day. Indications: mood stabilization * OXcarbazepine 300 mg tablet Commonly known as:  TRILEPTAL Take 900 mg by mouth nightly. [facility]  
  
 pantoprazole 40 mg tablet Commonly known as:  PROTONIX Take 1 Tab by mouth daily. pravastatin 20 mg tablet Commonly known as:  PRAVACHOL  
ONE TABLET BY MOUTH AT BEDTIME -  HYPERCHOLESTEROLEMIA SEROquel 50 mg tablet Generic drug:  QUEtiapine Take 50 mg by mouth nightly. * Notice: This list has 2 medication(s) that are the same as other medications prescribed for you. Read the directions carefully, and ask your doctor or other care provider to review them with you. Prescriptions Sent to Pharmacy Refills  
 pantoprazole (PROTONIX) 40 mg tablet 0 Sig: Take 1 Tab by mouth daily. Class: Normal  
 Pharmacy: [address] Ph #: [phone] Route: Oral  
  
Patient Instructions Pantoprazole (By mouth) Pantoprazole (pan-TOE-pra-zole) Treats gastroesophageal reflux disease (GERD), a damaged esophagus, and high levels of stomach acid. This medicine is a proton pump inhibitor (PPI). Brand Name(s): Protonix There may be other brand names for this medicine. When This Medicine Should Not Be Used: This medicine is not right for everyone. Do not use it if you had an allergic reaction to pantoprazole or similar medicines. How to Use This Medicine:  
Packet, Tablet, Delayed Release Tablet, Long Acting Tablet · Your doctor will tell you how much medicine to use. Do not use more than directed. Take the medicine at least 30 minutes before a meal. 
· Delayed-release tablet: Swallow the tablet whole. Do not crush, break, or chew it. · Delayed-release packet: ¨ To prepare with applesauce: § Mix the packet contents with 1 teaspoon of applesauce. Do not mix with water, or other liquids or food. Do not divide the packet contents to make smaller doses. § Swallow the mixture within 10 minutes after you mix it. Do not chew or crush the granules. § Sip some water after you take the mixture to make sure you swallow all of the medicine. ¨ To prepare with apple juice: § Mix the packet contents with 1 teaspoon of apple juice in a small cup. Do not divide the packet contents to make smaller doses. § Stir for 5 seconds and drink the mixture immediately. Do not chew or crush the granules. § To make sure you get all of the medicine, add more apple juice to the cup. Drink it immediately. ¨ To prepare for a feeding tube: § Pour the packet contents in a 2-ounce (60 milliliter [mL]) catheter-tip syringe. § Add 10 mL of apple juice to the syringe. Add the mixture to the tube. Gently tap or shake the barrel of the syringe to help empty it. § Add 10 mL of apple juice to the syringe and put it in the tube. Do this at least 2 times. There should be no granules left in the syringe. · This medicine should come with a Medication Guide. Ask your pharmacist for a copy if you do not have one. · Missed dose: Take a dose as soon as you remember. If it is almost time for your next dose, wait until then and take a regular dose. Do not take extra medicine to make up for a missed dose. · Store the medicine in a closed container at room temperature, away from heat, moisture, and direct light. Drugs and Foods to Avoid: Ask your doctor or pharmacist before using any other medicine, including over-the-counter medicines, vitamins, and herbal products. · Some foods and medicines can affect how pantoprazole works. Tell your doctor if you are using any of the following: ¨ Ampicillin, atazanavir, digoxin, erlotinib, ketoconazole, methotrexate, mycophenolate mofetil, nelfinavir ¨ Blood thinner (including warfarin) ¨ Diuretic (water pill) ¨ Iron supplements Warnings While Using This Medicine: · Tell your doctor if you are pregnant or breastfeeding, or if you have liver disease, lupus, or osteoporosis. · This medicine may cause the following problems: ¨ Kidney problems ¨ Low vitamin B12 or magnesium levels ¨ Increased risk of broken bones in the hip, wrist, or spine · This medicine can cause diarrhea. Call your doctor if the diarrhea becomes severe, does not stop, or is bloody. Do not take any medicine to stop diarrhea until you have talked to your doctor. Diarrhea can occur 2 months or more after you stop taking this medicine. · Tell any doctor or dentist who treats you that you are using this medicine. This medicine may affect certain medical test results. · Your doctor will check your progress and the effects of this medicine at regular visits. Keep all appointments. · Keep all medicine out of the reach of children. Never share your medicine with anyone. Possible Side Effects While Using This Medicine:  
Call your doctor right away if you notice any of these side effects: · Allergic reaction: Itching or hives, swelling in your face or hands, swelling or tingling in your mouth or throat, chest tightness, trouble breathing · Blistering, peeling, red skin rash · Fever, joint pain, swelling in your body, unusual weight gain, change in how much or how often you urinate · Joint pain, rash on your cheeks or arms that gets worse in the sun · Seizures, dizziness, uneven heartbeat, muscle cramps or twitching · Severe diarrhea, stomach cramps, fever · Stomach pain, nausea, vomiting, weight loss If you notice other side effects that you think are caused by this medicine, tell your doctor. Call your doctor for medical advice about side effects. You may report side effects to FDA at 6-839-PLV-6646 © 2017 [facility] Information is for End User's use only and may not be sold, redistributed or otherwise used for commercial purposes. The above information is an  only. It is not intended as medical advice for individual conditions or treatments. Talk to your doctor, nurse or pharmacist before following any medical regimen to see if it is safe and effective for you. Introducing [facility]! Dear Sherice Salvador: Thank you for requesting a [employer] account. Our records indicate that you already have an active [employer] account. You can access your account anytime at [email or web address] Did you know that you can access your hospital and ER discharge instructions at any time in [employer]? You can also review all of your test results from your hospital stay or ER visit. Additional Information If you have questions, please visit the Frequently Asked Questions section of the [employer] website at [email or web address] Remember, [employer] is NOT to be used for urgent needs. For medical emergencies, dial 911. Now available from your iPhone and Android! Please provide this summary of care documentation to your next provider. Your primary care clinician is listed as Cely Phelps. If you have any questions after today's visit, please call [phone].

## 2018-10-15 NOTE — PROGRESS NOTES
Patient is in the office today for heartburn x 2 weeks. 1. Have you been to the ER, urgent care clinic since your last visit? Hospitalized since your last visit? No    2. Have you seen or consulted any other health care providers outside of the [address] [place] [address] since your last visit? Include any pap smears or colon screening.  No

## 2018-10-15 NOTE — PATIENT INSTRUCTIONS
Pantoprazole (By mouth)   Pantoprazole (pan-TOE-pra-zole)  Treats gastroesophageal reflux disease (GERD), a damaged esophagus, and high levels of stomach acid. This medicine is a proton pump inhibitor (PPI). Brand Name(s): Protonix   There may be other brand names for this medicine. When This Medicine Should Not Be Used: This medicine is not right for everyone. Do not use it if you had an allergic reaction to pantoprazole or similar medicines. How to Use This Medicine:   Packet, Tablet, Delayed Release Tablet, Long Acting Tablet  · Your doctor will tell you how much medicine to use. Do not use more than directed. Take the medicine at least 30 minutes before a meal.  · Delayed-release tablet: Swallow the tablet whole. Do not crush, break, or chew it. · Delayed-release packet:   ¨ To prepare with applesauce:   § Mix the packet contents with 1 teaspoon of applesauce. Do not mix with water, or other liquids or food. Do not divide the packet contents to make smaller doses. § Swallow the mixture within 10 minutes after you mix it. Do not chew or crush the granules. § Sip some water after you take the mixture to make sure you swallow all of the medicine. ¨ To prepare with apple juice:   § Mix the packet contents with 1 teaspoon of apple juice in a small cup. Do not divide the packet contents to make smaller doses. § Stir for 5 seconds and drink the mixture immediately. Do not chew or crush the granules. § To make sure you get all of the medicine, add more apple juice to the cup. Drink it immediately. ¨ To prepare for a feeding tube:   § Pour the packet contents in a 2-ounce (60 milliliter [mL]) catheter-tip syringe. § Add 10 mL of apple juice to the syringe. Add the mixture to the tube. Gently tap or shake the barrel of the syringe to help empty it. § Add 10 mL of apple juice to the syringe and put it in the tube. Do this at least 2 times. There should be no granules left in the syringe.   · This medicine should come with a Medication Guide. Ask your pharmacist for a copy if you do not have one. · Missed dose: Take a dose as soon as you remember. If it is almost time for your next dose, wait until then and take a regular dose. Do not take extra medicine to make up for a missed dose. · Store the medicine in a closed container at room temperature, away from heat, moisture, and direct light. Drugs and Foods to Avoid:   Ask your doctor or pharmacist before using any other medicine, including over-the-counter medicines, vitamins, and herbal products. · Some foods and medicines can affect how pantoprazole works. Tell your doctor if you are using any of the following:   ¨ Ampicillin, atazanavir, digoxin, erlotinib, ketoconazole, methotrexate, mycophenolate mofetil, nelfinavir  ¨ Blood thinner (including warfarin)  ¨ Diuretic (water pill)  ¨ Iron supplements  Warnings While Using This Medicine:   · Tell your doctor if you are pregnant or breastfeeding, or if you have liver disease, lupus, or osteoporosis. · This medicine may cause the following problems:  ¨ Kidney problems  ¨ Low vitamin B12 or magnesium levels  ¨ Increased risk of broken bones in the hip, wrist, or spine  · This medicine can cause diarrhea. Call your doctor if the diarrhea becomes severe, does not stop, or is bloody. Do not take any medicine to stop diarrhea until you have talked to your doctor. Diarrhea can occur 2 months or more after you stop taking this medicine. · Tell any doctor or dentist who treats you that you are using this medicine. This medicine may affect certain medical test results. · Your doctor will check your progress and the effects of this medicine at regular visits. Keep all appointments. · Keep all medicine out of the reach of children. Never share your medicine with anyone.   Possible Side Effects While Using This Medicine:   Call your doctor right away if you notice any of these side effects:  · Allergic reaction: Itching or hives, swelling in your face or hands, swelling or tingling in your mouth or throat, chest tightness, trouble breathing  · Blistering, peeling, red skin rash  · Fever, joint pain, swelling in your body, unusual weight gain, change in how much or how often you urinate  · Joint pain, rash on your cheeks or arms that gets worse in the sun  · Seizures, dizziness, uneven heartbeat, muscle cramps or twitching  · Severe diarrhea, stomach cramps, fever  · Stomach pain, nausea, vomiting, weight loss  If you notice other side effects that you think are caused by this medicine, tell your doctor. Call your doctor for medical advice about side effects. You may report side effects to FDA at 2-837-AAP-6050  © 2017 [facility] Information is for End User's use only and may not be sold, redistributed or otherwise used for commercial purposes. The above information is an  only. It is not intended as medical advice for individual conditions or treatments. Talk to your doctor, nurse or pharmacist before following any medical regimen to see if it is safe and effective for you.

## 2018-10-24 ENCOUNTER — OFFICE VISIT (OUTPATIENT)
Dept: FAMILY MEDICINE CLINIC | Age: 23
End: 2018-10-24

## 2018-10-24 VITALS
TEMPERATURE: 98.2 F | HEIGHT: 65 IN | WEIGHT: 217.8 LBS | HEART RATE: 115 BPM | DIASTOLIC BLOOD PRESSURE: 86 MMHG | RESPIRATION RATE: 16 BRPM | OXYGEN SATURATION: 97 % | SYSTOLIC BLOOD PRESSURE: 128 MMHG | BODY MASS INDEX: 36.29 KG/M2
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
## 2018-10-24 DIAGNOSIS — S91.132A PUNCTURE WOUND OF GREAT TOE OF LEFT FOOT, INITIAL ENCOUNTER: ICD-10-CM

## 2018-10-24 DIAGNOSIS — Z23 ENCOUNTER FOR IMMUNIZATION: ICD-10-CM

## 2018-10-24 NOTE — PROGRESS NOTES
HISTORY OF PRESENT ILLNESS  Opal Hdz is a 25 y.o. female presents with caregiver Riri Mccoy from group home where patient resides. Patient reports she stepped on a piece glass this morning. Foot Injury    The history is provided by the patient and caregiver. This is a new problem. Episode onset: this morning  The problem has not changed since onset. The pain is present in the right toes (right great toe). Allergies   Allergen Reactions    Other Medication Unknown (comments)     Birth control pills     Current Outpatient Medications   Medication Sig Dispense Refill    pantoprazole (PROTONIX) 40 mg tablet Take 1 Tab by mouth daily. 30 Tab 0    lurasidone (LATUDA) 40 mg tab tablet Take  by mouth.  LORazepam (ATIVAN) 1 mg tablet Take 1 mg by mouth three (3) times daily. Prescribed by [facility] psychiatry      escitalopram oxalate (LEXAPRO) 20 mg tablet Take 20 mg by mouth daily. [facility] psychiatry      OXcarbazepine (TRILEPTAL) 300 mg tablet Take 900 mg by mouth nightly. [facility] psychiat[facility]      FISH -1,000 mg capsule Take 1,000 mg by mouth daily.  pravastatin (PRAVACHOL) 20 mg tablet ONE TABLET BY MOUTH AT BEDTIME -  HYPERCHOLESTEROLEMIA 30 Tab 5    haloperidol (HALDOL) 5 mg tablet   0    clindamycin-benzoyl peroxide (BENZACLIN) 1-5 % topical gel Apply  to affected area two (2) times a day. Apply to affected area after the skin has been cleansed and dried. 1 Tube 5    polyethylene glycol (MIRALAX) 17 gram/dose powder Take 17 g by mouth daily.  QUEtiapine (SEROQUEL) 50 mg tablet Take 50 mg by mouth nightly.  docusate sodium (COLACE) 100 mg capsule Take 100 mg by mouth two (2) times a day.  OXcarbazepine (TRILEPTAL) 600 mg tablet Take 1 Tab by mouth two (2) times a day. Indications: mood stabilization (Patient taking differently: Take 600 mg by mouth.  Takes with 300 mg trileptil hs to total 900 mg prescribed by psychiatry [facility]  Indications: mood stabilization) 60 Tab 0    Omega-3-DHA-EPA-Fish Oil 300-1,000 mg cap        Past Medical History:   Diagnosis Date    ADHD (attention deficit hyperactivity disorder)     Bipolar disorder (HCC)     Borderline personality disorder ([facility]     Mild mental retardation     PMS (premenstrual syndrome)     Thromboembolus ([facility] 2013    PE     Review of Systems   Constitutional: Negative for chills and fever. Musculoskeletal:        Piece of glass stuck in right great toe       /86 (BP 1 Location: Left arm)   Pulse (!) 115   Temp 98.2 °F (36.8 °C) (Oral)   Resp 16   Ht 5' 5\" (1.651 m)   Wt 217 lb 12.8 oz (98.8 kg)   SpO2 97%   BMI 36.24 kg/m²   Physical Exam   Constitutional: She appears well-developed and well-nourished. HENT:   Head: Normocephalic and atraumatic. Neck: Normal range of motion. Neck supple. Cardiovascular: Normal rate, regular rhythm and normal heart sounds. Exam reveals no gallop and no friction rub. No murmur heard. Pulmonary/Chest: Effort normal and breath sounds normal. She has no wheezes. She has no rhonchi. She has no rales. Musculoskeletal:        Feet:    Blood cleansed from toe with alcohol swab. Foreign object identified, tip of 11 blade scalpel used to the edge of object from toe. Using 2x2 object removed from toe and identified as a small shard of glass with black edge. Patient tolerated very well. No active bleeding observed. Area cleansed with alcohol swab and band aide applied. ASSESSMENT and PLAN    ICD-10-CM ICD-9-CM    1. Injury from broken glass, initial encounter W25. XXXA E920.8 TETANUS, DIPHTHERIA TOXOIDS AND ACELLULAR PERTUSSIS VACCINE (TDAP), IN INDIVIDS. >=7, IM   2. Puncture wound of great toe of left foot, initial encounter S91.132A 893.0 TETANUS, DIPHTHERIA TOXOIDS AND ACELLULAR PERTUSSIS VACCINE (TDAP), IN INDIVIDS. >=7, IM   3.  Encounter for immunization Z23 V03.89 INFLUENZA VIRUS VAC QUAD,SPLIT,PRESV FREE SYRINGE IM     I have discussed the diagnosis with the patient and the intended plan as seen in the above orders. The patient has received an after-visit summary and questions were answered concerning future plans. I have discussed medication side effects and warnings with the patient as well. Patient agreeable with above plan and verbalizes understanding. Follow-up Disposition:  Return if symptoms worsen or fail to improve.

## 2018-10-24 NOTE — PATIENT INSTRUCTIONS
Puncture Wounds: Care Instructions  Your Care Instructions    A puncture wound can happen anywhere on your body. These wounds tend to be narrower and deeper than cuts. A puncture wound is usually left open instead of being closed. This is because a puncture wound can be easily infected, and closing it can make infection even more likely. You will probably have a bandage over the wound. The doctor has checked you carefully, but problems can develop later. If you notice any problems or new symptoms, get medical treatment right away. Follow-up care is a key part of your treatment and safety. Be sure to make and go to all appointments, and call your doctor if you are having problems. It's also a good idea to know your test results and keep a list of the medicines you take. How can you care for yourself at home? · Keep the wound dry for the first 24 to 48 hours. After this, you can shower if your doctor okays it. Pat the wound dry. · Don't soak the wound, such as in a bathtub. Your doctor will tell you when it's safe to get the wound wet. · If your doctor told you how to care for your wound, follow your doctor's instructions. If you did not get instructions, follow this general advice:  ? After the first 24 to 48 hours, wash the wound with clean water 2 times a day. Don't use hydrogen peroxide or alcohol, which can slow healing. ? You may cover the wound with a thin layer of petroleum jelly, such as Vaseline, and a nonstick bandage. ? Apply more petroleum jelly and replace the bandage as needed. · Prop up the sore area on pillows anytime you sit or lie down during the next 3 days. Try to keep it above the level of your heart. This helps reduce swelling. · Avoid any activity that could cause your wound to get worse. · Be safe with medicines. Read and follow all instructions on the label. ? If the doctor gave you a prescription medicine for pain, take it as prescribed.   ? If you are not taking a prescription pain medicine, ask your doctor if you can take an over-the-counter medicine. · If your doctor prescribed antibiotics, take them as directed. Do not stop taking them just because you feel better. You need to take the full course of antibiotics. When should you call for help? Call your doctor now or seek immediate medical care if:    · You have new pain, or your pain gets worse.     · The wound starts to bleed, and blood soaks through the bandage. Oozing small amounts of blood is normal.     · The skin near the wound is cold or pale or changes color.     · You have tingling, weakness, or numbness near the wound.     · You have trouble moving the area near the wound.     · You have symptoms of infection, such as:  ? Increased pain, swelling, warmth, or redness around the wound. ? Red streaks leading from the wound. ? Pus draining from the wound. ? A fever.    Watch closely for changes in your health, and be sure to contact your doctor if:    · The wound is not closing (getting smaller).     · You do not get better as expected. Where can you learn more? Go to http://paty-jennifer.info/. Enter W234 in the search box to learn more about \"Puncture Wounds: Care Instructions. \"  Current as of: 2017  Content Version: 11.8  © 2289-7533 [employer]. Care instructions adapted under license by [employer] (which disclaims liability or warranty for this information). If you have questions about a medical condition or this instruction, always ask your healthcare professional. [place] [address] any warranty or liability for your use of this information. Tdap (Tetanus, Diphtheria, Pertussis) Vaccine: What You Need to Know  Why get vaccinated? Tetanus, diphtheria, and pertussis are very serious diseases. Tdap vaccine can protect us from these diseases.  And Tdap vaccine given to pregnant women can protect  babies against pertussis. Tetanus (lockjaw) is rare in the [facility] today. It causes painful muscle tightening and stiffness, usually all over the body. · It can lead to tightening of muscles in the head and neck so you can't open your mouth, swallow, or sometimes even breathe. Tetanus kills about 1 out of 10 people who are infected even after receiving the best medical care. Diphtheria is also rare in the [place] today. It can cause a thick coating to form in the back of the throat. · It can lead to breathing problems, heart failure, paralysis, and death. Pertussis (whooping cough) causes severe coughing spells, which can cause difficulty breathing, vomiting, and disturbed sleep. · It can also lead to weight loss, incontinence, and rib fractures. Up to 2 in 100 adolescents and 5 in 100 adults with pertussis are hospitalized or have complications, which could include pneumonia or death. These diseases are caused by bacteria. Diphtheria and pertussis are spread from person to person through secretions from coughing or sneezing. Tetanus enters the body through cuts, scratches, or wounds. Before vaccines, as many as 200,000 cases of diphtheria, 200,000 cases of pertussis, and hundreds of cases of tetanus were reported in the [place] each year. Since vaccination began, reports of cases for tetanus and diphtheria have dropped by about 99% and for pertussis by about 80%. Tdap vaccine  The Tdap vaccine can protect adolescents and adults from tetanus, diphtheria, and pertussis. One dose of Tdap is routinely given at age 6 or 15. People who did not get Tdap at that age should get it as soon as possible. Tdap is especially important for health care professionals and anyone having close contact with a baby younger than 12 months. Pregnant women should get a dose of Tdap during every pregnancy, to protect the  from pertussis.  Infants are most at risk for severe, life-threatening complications from pertussis. Another vaccine, called Td, protects against tetanus and diphtheria, but not pertussis. A Td booster should be given every 10 years. Tdap may be given as one of these boosters if you have never gotten Tdap before. Tdap may also be given after a severe cut or burn to prevent tetanus infection. Your doctor or the person giving you the vaccine can give you more information. Tdap may safely be given at the same time as other vaccines. Some people should not get this vaccine  · A person who has ever had a life-threatening allergic reaction after a previous dose of any diphtheria-, tetanus-, or pertussis-containing vaccine, OR has a severe allergy to any part of this vaccine, should not get Tdap vaccine. Tell the person giving the vaccine about any severe allergies. · Anyone who had coma or long repeated seizures within 7 days after a childhood dose of DTP or DTaP, or a previous dose of Tdap, should not get Tdap, unless a cause other than the vaccine was found. They can still get Td. · Talk to your doctor if you:  ? Have seizures or another nervous system problem. ? Had severe pain or swelling after any vaccine containing diphtheria, tetanus, or pertussis. ? Ever had a condition called Guillain-Barré Syndrome (GBS). ? Aren't feeling well on the day the shot is scheduled. Risks  With any medicine, including vaccines, there is a chance of side effects. These are usually mild and go away on their own. Serious reactions are also possible but are rare. Most people who get Tdap vaccine do not have any problems with it.   Mild problems following Tdap  (Did not interfere with activities)  · Pain where the shot was given (about 3 in 4 adolescents or 2 in 3 adults)  · Redness or swelling where the shot was given (about 1 person in 5)  · Mild fever of at least 100.4°F (up to about 1 in 25 adolescents or 1 in 100 adults)  · Headache (about 3 or 4 people in 10)  · Tiredness (about 1 person in 3 or 4)  · Nausea, vomiting, diarrhea, stomachache (up to 1 in 4 adolescents or 1 in 10 adults)  · Chills, sore joints (about 1 person in 10)  · Body aches (about 1 person in 3 or 4)  · Rash, swollen glands (uncommon)  Moderate problems following Tdap  (Interfered with activities, but did not require medical attention)  · Pain where the shot was given (up to 1 in 5 or 6)  · Redness or swelling where the shot was given (up to about 1 in 16 adolescents or 1 in 12 adults)  · Fever over 102°F (about 1 in 100 adolescents or 1 in 250 adults)  · Headache (about 1 in 7 adolescents or 1 in 10 adults)  · Nausea, vomiting, diarrhea, stomachache (up to 1 to 3 people in 100)  · Swelling of the entire arm where the shot was given (up to about 1 in 500)  Severe problems following Tdap  (Unable to perform usual activities; required medical attention)  · Swelling, severe pain, bleeding and redness in the arm where the shot was given (rare)  Problems that could happen after any vaccine:  · People sometimes faint after a medical procedure, including vaccination. Sitting or lying down for about 15 minutes can help prevent fainting, and injuries caused by a fall. Tell your doctor if you feel dizzy or have vision changes or ringing in the ears. · Some people get severe pain in the shoulder and have difficulty moving the arm where a shot was given. This happens very rarely. · Any medication can cause a severe allergic reaction. Such reactions from a vaccine are very rare, estimated at fewer than 1 in a million doses, and would happen within a few minutes to a few hours after the vaccination. As with any medicine, there is a very remote chance of a vaccine causing a serious injury or death. The safety of vaccines is always being monitored. For more information, visit: www.cdc.gov/vaccinesafety. What if there is a serious problem? What should I look for?   · Look for anything that concerns you, such as signs of a severe allergic reaction, very high fever, or unusual behavior. Signs of a severe allergic reaction can include hives, swelling of the face and throat, difficulty breathing, a fast heartbeat, dizziness, and weakness. These would usually start a few minutes to a few hours after the vaccination. What should I do? · If you think it is a severe allergic reaction or other emergency that can't wait, call 9-1-1 or get the person to the nearest hospital. Otherwise, call your doctor. · Afterward, the reaction should be reported to the Vaccine Adverse Event Reporting System (VAERS). Your doctor might file this report, or you can do it yourself through the VAERS web site at www.vaers. [facility].gov, or by calling 1-804.609.8403. VAERS does not give medical advice. The National Vaccine Injury Compensation Program  The National Vaccine Injury Compensation Program (VICP) is a federal program that was created to compensate people who may have been injured by certain vaccines. Persons who believe they may have been injured by a vaccine can learn about the program and about filing a claim by calling 7-602.121.3410 or visiting the [employer] website at www.[facility].gov/vaccinecompensation. There is a time limit to file a claim for compensation. How can I learn more? · Ask your doctor. He or she can give you the vaccine package insert or suggest other sources of information. · Call your local or state health department. · Contact the Centers for Disease Control and Prevention (CDC):  ? Call 0-276.132.7399 (1-800-CDC-INFO) or  ? Visit CDC's website at www.cdc.gov/vaccines  Vaccine Information Statement (Interim)  Tdap Vaccine  (2/24/15)  42 GEO [place] 120IY-48  Department of Health and Human Services  Centers for Disease Control and Prevention  Many Vaccine Information Statements are available in [place] and other languages. See www.immunize.org/vis. Muchas hojas de información sobre vacunas están disponibles en español y en otros idiomas. Visite www.immunize.org/vis.   Care instructions adapted under license by [employer] (which disclaims liability or warranty for this information). If you have questions about a medical condition or this instruction, always ask your healthcare professional. [place] [address] any warranty or liability for your use of this information. Influenza (Flu) Vaccine (Inactivated or Recombinant): What You Need to Know  Why get vaccinated? Influenza (\"flu\") is a contagious disease that spreads around the [place] every winter, usually between October and May. Flu is caused by influenza viruses and is spread mainly by coughing, sneezing, and close contact. Anyone can get flu. Flu strikes suddenly and can last several days. Symptoms vary by age, but can include:  · Fever/chills. · Sore throat. · Muscle aches. · Fatigue. · Cough. · Headache. · Runny or stuffy nose. Flu can also lead to pneumonia and blood infections, and cause diarrhea and seizures in children. If you have a medical condition, such as heart or lung disease, flu can make it worse. Flu is more dangerous for some people. Infants and young children, people 72years of age and older, pregnant women, and people with certain health conditions or a weakened immune system are at greatest risk. Each year thousands of people in the [facility] die from flu, and many more are hospitalized. Flu vaccine can:  · Keep you from getting flu. · Make flu less severe if you do get it. · Keep you from spreading flu to your family and other people. Inactivated and recombinant flu vaccines  A dose of flu vaccine is recommended every flu season. Children 6 months through 6years of age may need two doses during the same flu season. Everyone else needs only one dose each flu season.   Some inactivated flu vaccines contain a very small amount of a mercury-based preservative called thimerosal. Studies have not shown thimerosal in vaccines to be harmful, but flu vaccines that do not contain thimerosal are available. There is no live flu virus in flu shots. They cannot cause the flu. There are many flu viruses, and they are always changing. Each year a new flu vaccine is made to protect against three or four viruses that are likely to cause disease in the upcoming flu season. But even when the vaccine doesn't exactly match these viruses, it may still provide some protection. Flu vaccine cannot prevent:  · Flu that is caused by a virus not covered by the vaccine. · Illnesses that look like flu but are not. Some people should not get this vaccine  Tell the person who is giving you the vaccine:  · If you have any severe (life-threatening) allergies. If you ever had a life-threatening allergic reaction after a dose of flu vaccine, or have a severe allergy to any part of this vaccine, you may be advised not to get vaccinated. Most, but not all, types of flu vaccine contain a small amount of egg protein. · If you ever had Guillain-Barré syndrome (also called GBS) Some people with a history of GBS should not get this vaccine. This should be discussed with your doctor. · If you are not feeling well. It is usually okay to get flu vaccine when you have a mild illness, but you might be asked to come back when you feel better. Risks of a vaccine reaction  With any medicine, including vaccines, there is a chance of reactions. These are usually mild and go away on their own, but serious reactions are also possible. Most people who get a flu shot do not have any problems with it. Minor problems following a flu shot include:  · Soreness, redness, or swelling where the shot was given  · Hoarseness  · Sore, red or itchy eyes  · Cough  · Fever  · Aches  · Headache  · Itching  · Fatigue  If these problems occur, they usually begin soon after the shot and last 1 or 2 days.   More serious problems following a flu shot can include the following:  · There may be a small increased risk of Guillain-Barré Syndrome (GBS) after inactivated flu vaccine. This risk has been estimated at 1 or 2 additional cases per million people vaccinated. This is much lower than the risk of severe complications from flu, which can be prevented by flu vaccine. · Jany Boyer children who get the flu shot along with pneumococcal vaccine (PCV13) and/or DTaP vaccine at the same time might be slightly more likely to have a seizure caused by fever. Ask your doctor for more information. Tell your doctor if a child who is getting flu vaccine has ever had a seizure  Problems that could happen after any injected vaccine:  · People sometimes faint after a medical procedure, including vaccination. Sitting or lying down for about 15 minutes can help prevent fainting, and injuries caused by a fall. Tell your doctor if you feel dizzy, or have vision changes or ringing in the ears. · Some people get severe pain in the shoulder and have difficulty moving the arm where a shot was given. This happens very rarely. · Any medication can cause a severe allergic reaction. Such reactions from a vaccine are very rare, estimated at about 1 in a million doses, and would happen within a few minutes to a few hours after the vaccination. As with any medicine, there is a very remote chance of a vaccine causing a serious injury or death. The safety of vaccines is always being monitored. For more information, visit: www.cdc.gov/vaccinesafety/. What if there is a serious reaction? What should I look for? · Look for anything that concerns you, such as signs of a severe allergic reaction, very high fever, or unusual behavior. Signs of a severe allergic reaction can include hives, swelling of the face and throat, difficulty breathing, a fast heartbeat, dizziness, and weakness - usually within a few minutes to a few hours after the vaccination. What should I do?   · If you think it is a severe allergic reaction or other emergency that can't wait, call 9-1-1 and get the person to the nearest hospital. Otherwise, call your doctor. · Reactions should be reported to the \"Vaccine Adverse Event Reporting System\" (VAERS). Your doctor should file this report, or you can do it yourself through the VAERS website at www.vaers. [facility].gov, or by calling 9-848.797.9137. VAERS does not give medical advice. The National Vaccine Injury Compensation Program  The National Vaccine Injury Compensation Program (VICP) is a federal program that was created to compensate people who may have been injured by certain vaccines. Persons who believe they may have been injured by a vaccine can learn about the program and about filing a claim by calling 3-195.259.3998 or visiting the [employer] website at www.[facility].gov/vaccinecompensation. There is a time limit to file a claim for compensation. How can I learn more? · Ask your healthcare provider. He or she can give you the vaccine package insert or suggest other sources of information. · Call your local or state health department. · Contact the Centers for Disease Control and Prevention (CDC):  ? Call 0-884.504.2518 (1-800-CDC-INFO) or  ? Visit CDC's website at www.cdc.gov/flu  Vaccine Information Statement  Inactivated Influenza Vaccine  8/7/2015)  42 GEO [place] 409HP-03  Department of Health and Human Services  Centers for Disease Control and Prevention  Many Vaccine Information Statements are available in [place] and other languages. See www.immunize.org/vis. Muchas hojas de información sobre vacunas están disponibles en español y en otros idiomas. Visite www.immunize.org/vis. Care instructions adapted under license by [employer] (which disclaims liability or warranty for this information). If you have questions about a medical condition or this instruction, always ask your healthcare professional. [place] [address] any warranty or liability for your use of this information.

## 2018-10-24 NOTE — PROGRESS NOTES
Pt is here for stepping on a piece of glass this morning. Unsure of last TDAP. 1. Have you been to the ER, urgent care clinic since your last visit? Hospitalized since your last visit? No    2. Have you seen or consulted any other health care providers outside of the [address] [place] [address] since your last visit? Include any pap smears or colon screening.  No

## 2018-11-29 DIAGNOSIS — R12 HEARTBURN: ICD-10-CM

## 2018-11-29 RX ORDER — PANTOPRAZOLE SODIUM 40 MG/1
40 TABLET, DELAYED RELEASE ORAL DAILY
Qty: 30 TAB | Refills: 5 | Status: SHIPPED | OUTPATIENT
Start: 2018-11-29 | End: 2019-04-22 | Stop reason: SDUPTHER

## 2018-12-06 ENCOUNTER — OFFICE VISIT (OUTPATIENT)
Dept: FAMILY MEDICINE CLINIC | Age: 23
End: 2018-12-06

## 2018-12-06 VITALS
HEIGHT: 65 IN | OXYGEN SATURATION: 98 % | DIASTOLIC BLOOD PRESSURE: 81 MMHG | WEIGHT: 213 LBS | SYSTOLIC BLOOD PRESSURE: 112 MMHG | BODY MASS INDEX: 35.49 KG/M2 | RESPIRATION RATE: 17 BRPM | TEMPERATURE: 98.4 F | HEART RATE: 99 BPM

## 2018-12-06 DIAGNOSIS — M62.830 MUSCLE SPASM OF BACK: ICD-10-CM

## 2018-12-06 DIAGNOSIS — M32.9 SYSTEMIC LUPUS ERYTHEMATOSUS, UNSPECIFIED SLE TYPE, UNSPECIFIED ORGAN INVOLVEMENT STATUS (HCC): Primary | ICD-10-CM

## 2018-12-06 DIAGNOSIS — M54.2 NECK PAIN: ICD-10-CM

## 2018-12-06 RX ORDER — ORPHENADRINE CITRATE 100 MG/1
100 TABLET, EXTENDED RELEASE ORAL 2 TIMES DAILY
Qty: 28 TAB | Refills: 0 | Status: SHIPPED | OUTPATIENT
Start: 2018-12-06 | End: 2019-04-22

## 2018-12-06 NOTE — PROGRESS NOTES
Chief Complaint   Patient presents with    Other     referral to hematology    Back Pain    Neck Pain     1. Have you been to the ER, urgent care clinic since your last visit? Hospitalized since your last visit? No    2. Have you seen or consulted any other health care providers outside of the [address] [place] [address] since your last visit? Include any pap smears or colon screening.  No

## 2018-12-06 NOTE — PROGRESS NOTES
HISTORY OF PRESENT ILLNESS  Uriel Salguero is a 25 y.o. female. Patient presents with care giver for:  Chief Complaint   Patient presents with    Other     referral to hematology    Back Pain    Neck Pain     HPI  They would like a ref to hematology as the patient keeps saying she has Lupus. Pt was seen at [facility] 10-19-18    Pt is complaining of neck and back pain for quite sometime now. Review of Systems   Constitutional: Negative. HENT: Negative. Eyes: Negative. Respiratory: Negative. Cardiovascular: Negative. Musculoskeletal: Positive for back pain and neck pain. Visit Vitals  /81 (BP 1 Location: Left arm, BP Patient Position: Sitting)   Pulse 99   Temp 98.4 °F (36.9 °C) (Oral)   Resp 17   Ht 5' 5\" (1.651 m)   Wt 213 lb (96.6 kg)   SpO2 98%   BMI 35.45 kg/m²     Physical Exam   Constitutional: She appears well-developed. No distress. Cardiovascular: Normal rate, regular rhythm and normal heart sounds. No murmur heard. Pulmonary/Chest: Effort normal and breath sounds normal. No respiratory distress. She has no wheezes. She has no rales. Musculoskeletal:        Right shoulder: Normal.        Left shoulder: Normal.        Cervical back: She exhibits tenderness and spasm. She exhibits normal range of motion. Thoracic back: She exhibits tenderness and spasm. Lumbar back: She exhibits tenderness and spasm. She exhibits normal range of motion. ASSESSMENT and PLAN    ICD-10-CM ICD-9-CM    1. Systemic lupus erythematosus, unspecified SLE type, unspecified organ involvement status ([facility]) M32.9 710.0 REFERRAL TO RHEUMATOLOGY   2. Neck pain M54.2 723.1 orphenadrine citrate (NORFLEX) 100 mg sr tablet   3. Muscle spasm of back M62.830 724.8 orphenadrine citrate (NORFLEX) 100 mg sr tablet     PLAN  I discussed with care giver that Lupus is followed by rheumatology not hematology.  We will start there first. Then depending on blood work results if they are done, possible ref to hematology. Back and neck. Norflex bid for 2 weeks. If symptoms persist or worsen, pt is to RTC    Forms completed. I have discussed the diagnosis with the patient and the intended plan as seen in the above orders. The patient has received an after-visit summary and questions were answered concerning future plans. I have discussed medication side effects and warnings with the patient as well. Patient will call for further questions. Follow-up Disposition:  Return if symptoms worsen or fail to improve.

## 2018-12-10 ENCOUNTER — TELEPHONE (OUTPATIENT)
Dept: FAMILY MEDICINE CLINIC | Age: 23
End: 2018-12-10

## 2018-12-10 NOTE — TELEPHONE ENCOUNTER
Molina Ulrihc from [employer] is calling on behalf of th pt. The pt needs a pre auth for a medication but was not sure of the name. I called the pharmacy and the Pharmasist sent the request for   Orphenadrine 100 mg XR, but he said it was denied. He said the provider would need to file an appeal if she wants to put her on that medication.     [phone]

## 2018-12-11 RX ORDER — BACLOFEN 10 MG/1
10 TABLET ORAL 3 TIMES DAILY
Qty: 30 TAB | Refills: 0 | Status: SHIPPED | OUTPATIENT
Start: 2018-12-11 | End: 2019-04-22

## 2018-12-20 DIAGNOSIS — L70.0 ACNE VULGARIS: ICD-10-CM

## 2018-12-20 RX ORDER — CLINDAMYCIN AND BENZOYL PEROXIDE 10; 50 MG/G; MG/G
GEL TOPICAL
Qty: 25 G | Refills: 11 | Status: SHIPPED | OUTPATIENT
Start: 2018-12-20 | End: 2019-03-12 | Stop reason: SDUPTHER

## 2019-03-12 DIAGNOSIS — E78.2 MIXED HYPERLIPIDEMIA: ICD-10-CM

## 2019-03-12 DIAGNOSIS — L70.0 ACNE VULGARIS: ICD-10-CM

## 2019-03-12 RX ORDER — IBUPROFEN 600 MG/1
TABLET ORAL
Qty: 20 TAB | Refills: 5 | Status: SHIPPED | OUTPATIENT
Start: 2019-03-12 | End: 2019-04-22 | Stop reason: SDUPTHER

## 2019-03-12 RX ORDER — DIPHENHYDRAMINE HYDROCHLORIDE 25 MG/1
TABLET, FILM COATED ORAL
Qty: 24 TAB | Refills: 5 | Status: SHIPPED | OUTPATIENT
Start: 2019-03-12 | End: 2019-04-22

## 2019-03-12 RX ORDER — CLINDAMYCIN AND BENZOYL PEROXIDE 10; 50 MG/G; MG/G
GEL TOPICAL
Qty: 50 G | Refills: 5 | Status: SHIPPED | OUTPATIENT
Start: 2019-03-12 | End: 2019-04-22 | Stop reason: SDUPTHER

## 2019-03-12 RX ORDER — PRAVASTATIN SODIUM 20 MG/1
TABLET ORAL
Qty: 28 TAB | Refills: 11 | Status: SHIPPED | OUTPATIENT
Start: 2019-03-12 | End: 2019-04-22 | Stop reason: SDUPTHER

## 2019-03-12 RX ORDER — POLYETHYLENE GLYCOL 3350 17 G/17G
POWDER, FOR SOLUTION ORAL
Qty: 510 G | Refills: 5 | Status: SHIPPED | OUTPATIENT
Start: 2019-03-12 | End: 2019-04-22 | Stop reason: SDUPTHER

## 2019-03-12 RX ORDER — BIOTIN 1 MG
TABLET ORAL
Qty: 28 TAB | Refills: 5 | Status: SHIPPED | OUTPATIENT
Start: 2019-03-12 | End: 2019-04-22 | Stop reason: SDUPTHER

## 2019-04-22 ENCOUNTER — OFFICE VISIT (OUTPATIENT)
Dept: FAMILY MEDICINE CLINIC | Age: 24
End: 2019-04-22

## 2019-04-22 VITALS
WEIGHT: 200.8 LBS | TEMPERATURE: 98.3 F | HEIGHT: 65 IN | OXYGEN SATURATION: 98 % | HEART RATE: 103 BPM | BODY MASS INDEX: 33.45 KG/M2 | RESPIRATION RATE: 17 BRPM | SYSTOLIC BLOOD PRESSURE: 118 MMHG | DIASTOLIC BLOOD PRESSURE: 76 MMHG

## 2019-04-22 DIAGNOSIS — J01.00 ACUTE NON-RECURRENT MAXILLARY SINUSITIS: ICD-10-CM

## 2019-04-22 DIAGNOSIS — L70.0 ACNE VULGARIS: ICD-10-CM

## 2019-04-22 DIAGNOSIS — R12 HEARTBURN: ICD-10-CM

## 2019-04-22 DIAGNOSIS — E78.2 MIXED HYPERLIPIDEMIA: ICD-10-CM

## 2019-04-22 DIAGNOSIS — H66.91 OTITIS OF RIGHT EAR: Primary | ICD-10-CM

## 2019-04-22 DIAGNOSIS — K59.00 CONSTIPATION, UNSPECIFIED CONSTIPATION TYPE: ICD-10-CM

## 2019-04-22 RX ORDER — DOCUSATE SODIUM 100 MG/1
100 CAPSULE, LIQUID FILLED ORAL 2 TIMES DAILY
Qty: 30 CAP | Refills: 5 | Status: SHIPPED | OUTPATIENT
Start: 2019-04-22 | End: 2019-07-22 | Stop reason: SDUPTHER

## 2019-04-22 RX ORDER — MECLIZINE HCL 25MG 25 MG/1
25 TABLET, CHEWABLE ORAL
Qty: 30 TAB | Refills: 0 | Status: SHIPPED | OUTPATIENT
Start: 2019-04-22 | End: 2019-05-02

## 2019-04-22 RX ORDER — OMEGA-3 FATTY ACIDS/FISH OIL 300-1000MG
1 CAPSULE ORAL DAILY
Qty: 30 CAP | Refills: 5 | Status: ON HOLD | OUTPATIENT
Start: 2019-04-22 | End: 2019-09-11 | Stop reason: SDUPTHER

## 2019-04-22 RX ORDER — BIOTIN 1 MG
TABLET ORAL
Qty: 28 TAB | Refills: 5 | Status: SHIPPED | OUTPATIENT
Start: 2019-04-22 | End: 2019-09-16

## 2019-04-22 RX ORDER — CLINDAMYCIN AND BENZOYL PEROXIDE 10; 50 MG/G; MG/G
GEL TOPICAL 2 TIMES DAILY
Qty: 50 G | Refills: 5 | Status: SHIPPED | OUTPATIENT
Start: 2019-04-22 | End: 2019-09-16

## 2019-04-22 RX ORDER — PANTOPRAZOLE SODIUM 40 MG/1
40 TABLET, DELAYED RELEASE ORAL DAILY
Qty: 30 TAB | Refills: 5 | Status: ON HOLD | OUTPATIENT
Start: 2019-04-22 | End: 2019-09-11 | Stop reason: SDUPTHER

## 2019-04-22 RX ORDER — IBUPROFEN 600 MG/1
600 TABLET ORAL
Qty: 30 TAB | Refills: 5 | Status: SHIPPED | OUTPATIENT
Start: 2019-04-22 | End: 2019-09-16

## 2019-04-22 RX ORDER — AZITHROMYCIN 250 MG/1
TABLET, FILM COATED ORAL
Qty: 6 TAB | Refills: 0 | Status: SHIPPED | OUTPATIENT
Start: 2019-04-22 | End: 2019-04-27

## 2019-04-22 RX ORDER — PRAVASTATIN SODIUM 20 MG/1
20 TABLET ORAL
Qty: 30 TAB | Refills: 5 | Status: SHIPPED | OUTPATIENT
Start: 2019-04-22 | End: 2019-09-25 | Stop reason: SDUPTHER

## 2019-04-22 RX ORDER — POLYETHYLENE GLYCOL 3350 17 G/17G
POWDER, FOR SOLUTION ORAL
Qty: 510 G | Refills: 5 | Status: ON HOLD | OUTPATIENT
Start: 2019-04-22 | End: 2019-09-11 | Stop reason: SDUPTHER

## 2019-04-22 NOTE — PROGRESS NOTES
HISTORY OF PRESENT ILLNESS  Gila Denney is a 26 y.o. female. Patient presents for ear pain     HPI  Pt is going on a cruise and needs a script for sea sick pills and sunscreen. Ear pain started 5 days ago. Stuffy nose, runny nose. No fever. Review of Systems   Constitutional: Negative. HENT: Positive for congestion (green and yellow) and ear pain (right). Negative for sinus pain and sore throat. Eyes: Negative. Respiratory: Negative for cough, sputum production and shortness of breath. Cardiovascular: Negative. Visit Vitals  /76 (BP 1 Location: Left arm, BP Patient Position: Sitting)   Pulse (!) 103   Temp 98.3 °F (36.8 °C) (Oral)   Resp 17   Ht 5' 5\" (1.651 m)   Wt 200 lb 12.8 oz (91.1 kg)   SpO2 98%   BMI 33.41 kg/m²     Physical Exam   Constitutional: She appears well-developed. No distress. HENT:   Right Ear: A middle ear effusion is present. Left Ear: Tympanic membrane normal.   Nose: Mucosal edema and rhinorrhea present. Mouth/Throat: Posterior oropharyngeal erythema present. No oropharyngeal exudate. Cardiovascular: Normal rate, regular rhythm and normal heart sounds. No murmur heard. Pulmonary/Chest: Effort normal. No respiratory distress. She has no wheezes. She has rales. Neurological: She is alert. ASSESSMENT and PLAN    ICD-10-CM ICD-9-CM    1. Otitis of right ear H66.91 382.9 azithromycin (ZITHROMAX) 250 mg tablet   2. Acne vulgaris L70.0 706.1 clindamycin-benzoyl peroxide (BENZACLIN) 1-5 % topical gel   3. Heartburn R12 787.1 pantoprazole (PROTONIX) 40 mg tablet   4. Mixed hyperlipidemia E78.2 272.2 LIPID PANEL      METABOLIC PANEL, COMPREHENSIVE      pravastatin (PRAVACHOL) 20 mg tablet      LIPID PANEL      METABOLIC PANEL, COMPREHENSIVE   5. Acute non-recurrent maxillary sinusitis J01.00 461.0 azithromycin (ZITHROMAX) 250 mg tablet   6.  Constipation, unspecified constipation type K59.00 564.00 polyethylene glycol (MIRALAX) 17 gram/dose powder PLAN:  The Care Giver and I together reviewed her medications. Scripts were printed. Form completed  Care Giver is to call with if ear pain persist or worsens. I have discussed the diagnosis with the patient and the intended plan as seen in the above orders. The patient has received an after-visit summary and questions were answered concerning future plans. I have discussed medication side effects and warnings with the patient as well. Patient will call for further questions. Follow-up and Dispositions    · Return in about 3 months (around 7/22/2019).

## 2019-04-22 NOTE — PROGRESS NOTES
Chief Complaint   Patient presents with    Ear Pain     bilateral     Other     pt going on a cruise needs nausea meds     1. Have you been to the ER, urgent care clinic since your last visit? Hospitalized since your last visit? No    2. Have you seen or consulted any other health care providers outside of the [address] [place] [address] since your last visit? Include any pap smears or colon screening.  No

## 2019-04-23 LAB
ALBUMIN SERPL-MCNC: 4.1 G/DL (ref 3.5–5.5)
ALBUMIN/GLOB SERPL: 1.8 {RATIO} (ref 1.2–2.2)
ALP SERPL-CCNC: 149 IU/L (ref 39–117)
ALT SERPL-CCNC: 18 IU/L (ref 0–32)
AST SERPL-CCNC: 12 IU/L (ref 0–40)
BILIRUB SERPL-MCNC: <0.2 MG/DL (ref 0–1.2)
BUN SERPL-MCNC: 17 MG/DL (ref 6–20)
BUN/CREAT SERPL: 25 (ref 9–23)
CALCIUM SERPL-MCNC: 9.8 MG/DL (ref 8.7–10.2)
CHLORIDE SERPL-SCNC: 105 MMOL/L (ref 96–106)
CHOLEST SERPL-MCNC: 210 MG/DL (ref 100–199)
CO2 SERPL-SCNC: 21 MMOL/L (ref 20–29)
CREAT SERPL-MCNC: 0.68 MG/DL (ref 0.57–1)
GLOBULIN SER CALC-MCNC: 2.3 G/DL (ref 1.5–4.5)
GLUCOSE SERPL-MCNC: 85 MG/DL (ref 65–99)
HDLC SERPL-MCNC: 41 MG/DL
LDLC SERPL CALC-MCNC: 136 MG/DL (ref 0–99)
POTASSIUM SERPL-SCNC: 4.6 MMOL/L (ref 3.5–5.2)
PROT SERPL-MCNC: 6.4 G/DL (ref 6–8.5)
SODIUM SERPL-SCNC: 142 MMOL/L (ref 134–144)
TRIGL SERPL-MCNC: 167 MG/DL (ref 0–149)
VLDLC SERPL CALC-MCNC: 33 MG/DL (ref 5–40)

## 2019-06-06 ENCOUNTER — OFFICE VISIT (OUTPATIENT)
Dept: FAMILY MEDICINE CLINIC | Age: 24
End: 2019-06-06

## 2019-06-06 VITALS
TEMPERATURE: 98.2 F | RESPIRATION RATE: 17 BRPM | WEIGHT: 201 LBS | HEIGHT: 65 IN | OXYGEN SATURATION: 98 % | HEART RATE: 120 BPM | SYSTOLIC BLOOD PRESSURE: 119 MMHG | BODY MASS INDEX: 33.49 KG/M2 | DIASTOLIC BLOOD PRESSURE: 79 MMHG

## 2019-06-06 DIAGNOSIS — F31.9 BIPOLAR 1 DISORDER (HCC): ICD-10-CM

## 2019-06-06 DIAGNOSIS — K21.9 GASTROESOPHAGEAL REFLUX DISEASE WITHOUT ESOPHAGITIS: ICD-10-CM

## 2019-06-06 DIAGNOSIS — J01.00 ACUTE NON-RECURRENT MAXILLARY SINUSITIS: Primary | ICD-10-CM

## 2019-06-06 DIAGNOSIS — E78.2 MIXED HYPERLIPIDEMIA: ICD-10-CM

## 2019-06-06 DIAGNOSIS — E66.01 SEVERE OBESITY WITH BODY MASS INDEX (BMI) OF 35.0 TO 39.9 WITH SERIOUS COMORBIDITY (HCC): ICD-10-CM

## 2019-06-06 DIAGNOSIS — M32.9 SYSTEMIC LUPUS ERYTHEMATOSUS, UNSPECIFIED SLE TYPE, UNSPECIFIED ORGAN INVOLVEMENT STATUS (HCC): ICD-10-CM

## 2019-06-06 RX ORDER — CEFUROXIME AXETIL 500 MG/1
500 TABLET ORAL 2 TIMES DAILY
Qty: 20 TAB | Refills: 0 | Status: SHIPPED | OUTPATIENT
Start: 2019-06-06 | End: 2019-08-09 | Stop reason: ALTCHOICE

## 2019-06-06 RX ORDER — CEFUROXIME AXETIL 500 MG/1
500 TABLET ORAL 2 TIMES DAILY
Qty: 20 TAB | Refills: 0 | Status: SHIPPED | OUTPATIENT
Start: 2019-06-06 | End: 2019-06-06 | Stop reason: SDUPTHER

## 2019-06-06 NOTE — PROGRESS NOTES
HISTORY OF PRESENT ILLNESS  Emily Dash is a 26 y.o. female. Patient presents for not feeling well. HPI  Pt is not feeling well, throat and stomach hurts and ears are bothering her. She gets dizzy but does not pass out. This usually occurs with certain behaviors. Diet protocol is being requested. Review of Systems   Constitutional: Negative. HENT: Positive for congestion, ear pain and sore throat. Eyes: Negative. Respiratory: Positive for cough. Negative for sputum production and shortness of breath. Cardiovascular: Negative. Gastrointestinal: Negative. Genitourinary: Negative. Visit Vitals  /79 (BP 1 Location: Left arm, BP Patient Position: Sitting)   Pulse (!) 120   Temp 98.2 °F (36.8 °C) (Oral)   Resp 17   Ht 5' 5\" (1.651 m)   Wt 201 lb (91.2 kg)   SpO2 98%   BMI 33.45 kg/m²     Physical Exam   Constitutional: She is oriented to person, place, and time. She appears well-developed. No distress. HENT:   Right Ear: There is tenderness. Left Ear: There is tenderness. A middle ear effusion is present. Nose: Mucosal edema and rhinorrhea present. Right sinus exhibits maxillary sinus tenderness. Left sinus exhibits maxillary sinus tenderness. Mouth/Throat: No posterior oropharyngeal erythema. Eyes: Pupils are equal, round, and reactive to light. Conjunctivae and EOM are normal. Right eye exhibits no discharge. Left eye exhibits no discharge. Neck: Normal range of motion. Neck supple. No JVD present. No tracheal deviation present. No thyromegaly present. Cardiovascular: Normal rate, regular rhythm and normal heart sounds. No murmur heard. Pulmonary/Chest: Effort normal and breath sounds normal. No stridor. No respiratory distress. She has no wheezes. She has no rales. Abdominal: Soft. Bowel sounds are normal. She exhibits no distension and no mass. There is no tenderness. There is no rebound and no guarding. Musculoskeletal: Normal range of motion.  She exhibits no edema. Lymphadenopathy:     She has no cervical adenopathy. Neurological: She is alert and oriented to person, place, and time. No cranial nerve deficit. I have reviewed/discussed the above normal BMI with the patient. I have recommended the following interventions: dietary management education, guidance, and counseling and encourage exercise . Mor Castillo ASSESSMENT and PLAN    ICD-10-CM ICD-9-CM    1. Acute non-recurrent maxillary sinusitis J01.00 461.0 cefUROXime (CEFTIN) 500 mg tablet      DISCONTINUED: cefUROXime (CEFTIN) 500 mg tablet   2. Mixed hyperlipidemia E78.2 272.2    3. Bipolar 1 disorder (HCC) F31.9 296.7    4. Systemic lupus erythematosus, unspecified SLE type, unspecified organ involvement status ([facility] M32.9 710.0    5. Severe obesity with body mass index (BMI) of 35.0 to 39.9 with serious comorbidity (HCC) E66.01 278.01    6. Gastroesophageal reflux disease without esophagitis K21.9 530.81      PLAN:  I advised Care Giver that patient is not on any special diet or diet restrictions so there is no protocol. I did remind care giver that it is up to the [address] [place] [address] to provide healthy diet and snack options. Pt should be encouraged to exercise daily. Care Giver is to call if sinus symptoms persist or worsen. Forms completed for Group Home: Physical and today's visit. Along with AVS    I have discussed the diagnosis with the patient and the intended plan as seen in the above orders. The patient has received an after-visit summary and questions were answered concerning future plans. I have discussed medication side effects and warnings with the patient as well. Patient will call for further questions. Follow-up and Dispositions    · Return for october.

## 2019-06-06 NOTE — PROGRESS NOTES
Chief Complaint   Patient presents with    Well Woman     1. Have you been to the ER, urgent care clinic since your last visit? Hospitalized since your last visit? No    2. Have you seen or consulted any other health care providers outside of the [address] [place] [address] since your last visit? Include any pap smears or colon screening.  No

## 2019-07-22 RX ORDER — DOCUSATE SODIUM 100 MG/1
CAPSULE, LIQUID FILLED ORAL
Qty: 6 CAP | Refills: 11 | Status: SHIPPED | OUTPATIENT
Start: 2019-07-22 | End: 2019-09-16

## 2019-07-31 ENCOUNTER — OFFICE VISIT (OUTPATIENT)
Dept: FAMILY MEDICINE CLINIC | Age: 24
End: 2019-07-31

## 2019-07-31 VITALS
WEIGHT: 198.4 LBS | SYSTOLIC BLOOD PRESSURE: 114 MMHG | DIASTOLIC BLOOD PRESSURE: 77 MMHG | HEART RATE: 116 BPM | BODY MASS INDEX: 33.05 KG/M2 | RESPIRATION RATE: 18 BRPM | OXYGEN SATURATION: 96 % | TEMPERATURE: 98.2 F | HEIGHT: 65 IN

## 2019-07-31 DIAGNOSIS — L70.9 ACNE, UNSPECIFIED ACNE TYPE: ICD-10-CM

## 2019-07-31 DIAGNOSIS — N39.0 URINARY TRACT INFECTION WITHOUT HEMATURIA, SITE UNSPECIFIED: ICD-10-CM

## 2019-07-31 DIAGNOSIS — R35.0 FREQUENT URINATION: Primary | ICD-10-CM

## 2019-07-31 DIAGNOSIS — N89.8 VAGINAL ITCHING: ICD-10-CM

## 2019-07-31 LAB
BILIRUB UR QL STRIP: NEGATIVE
GLUCOSE UR-MCNC: NEGATIVE MG/DL
KETONES P FAST UR STRIP-MCNC: NEGATIVE MG/DL
PH UR STRIP: 7 [PH] (ref 4.6–8)
PROT UR QL STRIP: NEGATIVE
SP GR UR STRIP: 1.02 (ref 1–1.03)
UA UROBILINOGEN AMB POC: NORMAL (ref 0.2–1)
URINALYSIS CLARITY POC: CLEAR
URINALYSIS COLOR POC: NORMAL
URINE BLOOD POC: NEGATIVE
URINE LEUKOCYTES POC: NORMAL
URINE NITRITES POC: NEGATIVE

## 2019-07-31 RX ORDER — NITROFURANTOIN 25; 75 MG/1; MG/1
100 CAPSULE ORAL 2 TIMES DAILY
Qty: 14 CAP | Refills: 0 | Status: SHIPPED | OUTPATIENT
Start: 2019-07-31 | End: 2019-08-07

## 2019-07-31 RX ORDER — FLUCONAZOLE 150 MG/1
150 TABLET ORAL DAILY
Qty: 1 TAB | Refills: 0 | Status: SHIPPED | OUTPATIENT
Start: 2019-07-31 | End: 2019-08-01

## 2019-07-31 RX ORDER — HALOPERIDOL 5 MG/ML
INJECTION INTRAMUSCULAR ONCE
COMMUNITY
End: 2019-09-16

## 2019-07-31 NOTE — PATIENT INSTRUCTIONS
Vaginal Yeast Infection: Care Instructions  Your Care Instructions    A vaginal yeast infection is caused by too many yeast cells in the vagina. This is common in women of all ages. Itching, vaginal discharge and irritation, and other symptoms can bother you. But yeast infections don't often cause other health problems. Some medicines can increase your risk of getting a yeast infection. These include antibiotics, birth control pills, hormones, and steroids. You may also be more likely to get a yeast infection if you are pregnant, have diabetes, douche, or wear tight clothes. With treatment, most yeast infections get better in 2 to 3 days. Follow-up care is a key part of your treatment and safety. Be sure to make and go to all appointments, and call your doctor if you are having problems. It's also a good idea to know your test results and keep a list of the medicines you take. How can you care for yourself at home? · Take your medicines exactly as prescribed. Call your doctor if you think you are having a problem with your medicine. · Ask your doctor about over-the-counter (OTC) medicines for yeast infections. They may cost less than prescription medicines. If you use an OTC treatment, read and follow all instructions on the label. · Do not use tampons while using a vaginal cream or suppository. The tampons can absorb the medicine. Use pads instead. · Wear loose cotton clothing. Do not wear nylon or other fabric that holds body heat and moisture close to the skin. · Try sleeping without underwear. · Do not scratch. Relieve itching with a cold pack or a cool bath. · Do not wash your vaginal area more than once a day. Use plain water or a mild, unscented soap. Air-dry the vaginal area. · Change out of wet swimsuits after swimming. · Do not have sex until you have finished your treatment. · Do not douche. When should you call for help?   Call your doctor now or seek immediate medical care if:    · You have unexpected vaginal bleeding.     · You have new or increased pain in your vagina or pelvis.    Watch closely for changes in your health, and be sure to contact your doctor if:    · You have a fever.     · You are not getting better after 2 days.     · Your symptoms come back after you finish your medicines. Where can you learn more? Go to http://paty-jennifer.info/. Enter E291 in the search box to learn more about \"Vaginal Yeast Infection: Care Instructions. \"  Current as of: February 19, 2019  Content Version: 12.1  © 0116-5363 [employer]. Care instructions adapted under license by [employer] (which disclaims liability or warranty for this information). If you have questions about a medical condition or this instruction, always ask your healthcare professional. [place] [address] any warranty or liability for your use of this information. Urinary Tract Infection in Women: Care Instructions  Your Care Instructions    A urinary tract infection, or UTI, is a general term for an infection anywhere between the kidneys and the urethra (where urine comes out). Most UTIs are bladder infections. They often cause pain or burning when you urinate. UTIs are caused by bacteria and can be cured with antibiotics. Be sure to complete your treatment so that the infection goes away. Follow-up care is a key part of your treatment and safety. Be sure to make and go to all appointments, and call your doctor if you are having problems. It's also a good idea to know your test results and keep a list of the medicines you take. How can you care for yourself at home? · Take your antibiotics as directed. Do not stop taking them just because you feel better. You need to take the full course of antibiotics. · Drink extra water and other fluids for the next day or two. This may help wash out the bacteria that are causing the infection.  (If you have kidney, heart, or liver disease and have to limit fluids, talk with your doctor before you increase your fluid intake.)  · Avoid drinks that are carbonated or have caffeine. They can irritate the bladder. · Urinate often. Try to empty your bladder each time. · To relieve pain, take a hot bath or lay a heating pad set on low over your lower belly or genital area. Never go to sleep with a heating pad in place. To prevent UTIs  · Drink plenty of water each day. This helps you urinate often, which clears bacteria from your system. (If you have kidney, heart, or liver disease and have to limit fluids, talk with your doctor before you increase your fluid intake.)  · Urinate when you need to. · Urinate right after you have sex. · Change sanitary pads often. · Avoid douches, bubble baths, feminine hygiene sprays, and other feminine hygiene products that have deodorants. · After going to the bathroom, wipe from front to back. When should you call for help? Call your doctor now or seek immediate medical care if:    · Symptoms such as fever, chills, nausea, or vomiting get worse or appear for the first time.     · You have new pain in your back just below your rib cage. This is called flank pain.     · There is new blood or pus in your urine.     · You have any problems with your antibiotic medicine.    Watch closely for changes in your health, and be sure to contact your doctor if:    · You are not getting better after taking an antibiotic for 2 days.     · Your symptoms go away but then come back. Where can you learn more? Go to http://paty-jennifer.info/. Enter E723 in the search box to learn more about \"Urinary Tract Infection in Women: Care Instructions. \"  Current as of: December 19, 2018  Content Version: 12.1  © 1880-2801 [employer]. Care instructions adapted under license by [employer] (which disclaims liability or warranty for this information).  If you have questions about a medical condition or this instruction, always ask your healthcare professional. [place] [address] any warranty or liability for your use of this information.

## 2019-07-31 NOTE — PROGRESS NOTES
Chief Complaint   Patient presents with    Vaginal Itching    Dizziness     1. Have you been to the ER, urgent care clinic since your last visit? Hospitalized since your last visit? No    2. Have you seen or consulted any other health care providers outside of the [address] [place] [address] since your last visit? Include any pap smears or colon screening.  No

## 2019-07-31 NOTE — PROGRESS NOTES
SID Wallace is a 26 y.o. female, accompanied by staff from [facility], who presents today for vaginal itching, burning and urinary frequency. Pt notes she has had the symptoms for about 2 days now. Pt states she only uses dove soap to vaginal area. Pt also note she has recurrent acne to chin and no improvement with current treatment, pt's caregiver is requesting referral for dermatology. Pt denies any other acute medical concern today. Current Outpatient Medications   Medication Sig Dispense Refill    paliperidone palmitate (INVEGA SUSTENNA) 156 mg/mL injection 156 mg by IntraMUSCular route once.  haloperidol lactate (HALDOL) 5 mg/mL injection by IntraMUSCular route once.   mg capsule ONE CAPSULE BY MOUTH TWICE DAILY - CONSTIPATION 6 Cap 11    escitalopram oxalate (LEXAPRO) 20 mg tablet ONE TABLET BY MOUTH EVERY MORNING - DEPRESSION 30 Tab 5    cefUROXime (CEFTIN) 500 mg tablet Take 1 Tab by mouth two (2) times a day. 20 Tab 0    etonogestrel (NEXPLANON) 68 mg impl 68 mg.      sunscreen lotion Use once a day 1 Bottle 0    clindamycin-benzoyl peroxide (BENZACLIN) 1-5 % topical gel Apply  to affected area two (2) times a day. Apply to affected area after the skin has been cleansed and dried. 50 g 5    pantoprazole (PROTONIX) 40 mg tablet Take 1 Tab by mouth daily. 30 Tab 5    pravastatin (PRAVACHOL) 20 mg tablet Take 1 Tab by mouth nightly. 30 Tab 5    biotin 1 mg tab Use once a day am 28 Tab 5    ibuprofen (MOTRIN) 600 mg tablet Take 1 Tab by mouth every eight (8) hours as needed for Pain. 30 Tab 5    omega-3/dha/epa/fish oil (FISH OIL-OMEGA-3-DHA-EPA) 300-1,000 mg capsule Take 1 Cap by mouth daily. 30 Cap 5    polyethylene glycol (MIRALAX) 17 gram/dose powder Give qam 510 g 5    LORazepam (ATIVAN) 1 mg tablet Take 2 mg by mouth three (3) times daily.  Prescribed by [facility]      OXcarbazepine (TRILEPTAL) 300 mg tablet Take 900 mg by mouth nightly. [facility] psychiat[facility]      QUEtiapine (SEROQUEL) 50 mg tablet Take 50 mg by mouth nightly.  OXcarbazepine (TRILEPTAL) 600 mg tablet Take 1 Tab by mouth two (2) times a day. Indications: mood stabilization (Patient taking differently: Take 600 mg by mouth. Takes with 300 mg trileptil hs to total 900 mg prescribed by psychiatry [facility]  Indications: mood stabilization) 60 Tab 0    lurasidone (LATUDA) 40 mg tab tablet Take  by mouth.  haloperidol (HALDOL) 5 mg tablet   0      Allergies   Allergen Reactions    Other Medication Unknown (comments)     Birth control pills       SUBJECTIVE:  Review of Systems   Constitutional: Negative for chills, fever and malaise/fatigue. Respiratory: Negative for shortness of breath. Cardiovascular: Negative for chest pain, palpitations and leg swelling. Gastrointestinal: Negative for abdominal pain, diarrhea, nausea and vomiting. Genitourinary: Positive for frequency. Vaginal itching and burning   Musculoskeletal: Negative for myalgias. Skin:        Facial acne   Neurological: Negative for dizziness, tingling, sensory change and headaches. OBJECTIVE:  Visit Vitals  /77 (BP 1 Location: Left arm, BP Patient Position: Sitting)   Pulse (!) 116   Temp 98.2 °F (36.8 °C) (Oral)   Resp 18   Ht 5' 5\" (1.651 m)   Wt 198 lb 6.4 oz (90 kg)   SpO2 96%   BMI 33.02 kg/m²      I have reviewed/discussed the above normal BMI with the patient. I have recommended the following interventions: dietary management education, guidance, and counseling, encourage exercise and monitor weight . Physical Exam   Constitutional: She is oriented to person, place, and time and well-developed, well-nourished, and in no distress. HENT:   Head: Normocephalic. Eyes: Pupils are equal, round, and reactive to light. Conjunctivae and EOM are normal.   Cardiovascular: Normal rate, regular rhythm and normal heart sounds.    Pulmonary/Chest: Effort normal and breath sounds normal. She has no wheezes. She has no rales. She exhibits no tenderness. Musculoskeletal: She exhibits no edema or tenderness (neg CVA tenderness). Neurological: She is alert and oriented to person, place, and time. Gait normal.   Skin: Skin is warm and dry. No erythema. Psychiatric: Mood and affect normal.   Nursing note and vitals reviewed. ASSESSMENT:  Diagnoses and all orders for this visit:    1. Frequent urination  -     AMB POC URINALYSIS DIP STICK AUTO W/ MICRO    2. Vaginal itching  -     AMB POC URINALYSIS DIP STICK AUTO W/ MICRO  -     fluconazole (DIFLUCAN) 150 mg tablet; Take 1 Tab by mouth daily for 1 day. FDA advises cautious prescribing of oral fluconazole in pregnancy. -     CULTURE, URINE; Future    3. Urinary tract infection without hematuria, site unspecified  -     nitrofurantoin, macrocrystal-monohydrate, (MACROBID) 100 mg capsule; Take 1 Cap by mouth two (2) times a day for 7 days. 4. Acne, unspecified acne type  -     REFERRAL TO DERMATOLOGY    PLAN:  UA today- trace leuk  Start Macrobid BID for 7 days  Start Diflucan 150 mg once  Referral placed for dermatology    I have discussed the diagnosis with the patient and the intended plan as seen in the above orders. The patient has received an after-visit summary and questions were answered concerning future plans. I have discussed medication side effects and warnings with the patient as well. Patient will call for further questions. Follow-up and Dispositions    · Return in about 2 weeks (around 8/14/2019), or if symptoms worsen or fail to improve.        Denice Campbell NP

## 2019-08-02 LAB — BACTERIA UR CULT: ABNORMAL

## 2019-08-09 DIAGNOSIS — N39.0 URINARY TRACT INFECTION WITHOUT HEMATURIA, SITE UNSPECIFIED: Primary | ICD-10-CM

## 2019-08-09 RX ORDER — AMOXICILLIN 875 MG/1
875 TABLET, FILM COATED ORAL 2 TIMES DAILY
Qty: 14 TAB | Refills: 0 | Status: SHIPPED | OUTPATIENT
Start: 2019-08-09 | End: 2019-08-16

## 2019-08-09 NOTE — PROGRESS NOTES
Called and notified caregiver/staff at medical group home results and treatment that was sent over to pharmacy.

## 2019-09-03 ENCOUNTER — HOSPITAL ENCOUNTER (INPATIENT)
Age: 24
LOS: 12 days | Discharge: HOME | DRG: 753 | End: 2019-09-16
Attending: EMERGENCY MEDICINE | Admitting: PSYCHIATRY & NEUROLOGY
Payer: MEDICAID

## 2019-09-03 DIAGNOSIS — R45.851 SUICIDAL THOUGHTS: Primary | ICD-10-CM

## 2019-09-03 DIAGNOSIS — F31.64 SEVERE MIXED BIPOLAR I DISORDER W/PSYCHOTIC FEATURES, MOOD-INCONGRUENT (HCC): ICD-10-CM

## 2019-09-03 LAB
ALBUMIN SERPL-MCNC: 3.7 G/DL (ref 3.4–5)
ALBUMIN/GLOB SERPL: 0.9 {RATIO} (ref 0.8–1.7)
ALP SERPL-CCNC: 167 U/L (ref 45–117)
ALT SERPL-CCNC: 30 U/L (ref 13–56)
AMPHET UR QL SCN: NEGATIVE
ANION GAP SERPL CALC-SCNC: 8 MMOL/L (ref 3–18)
AST SERPL-CCNC: 20 U/L (ref 10–38)
BARBITURATES UR QL SCN: NEGATIVE
BASOPHILS # BLD: 0 K/UL (ref 0–0.1)
BASOPHILS NFR BLD: 0 % (ref 0–2)
BENZODIAZ UR QL: NEGATIVE
BILIRUB SERPL-MCNC: 0.2 MG/DL (ref 0.2–1)
BUN SERPL-MCNC: 17 MG/DL (ref 7–18)
BUN/CREAT SERPL: 25 (ref 12–20)
CALCIUM SERPL-MCNC: 9.3 MG/DL (ref 8.5–10.1)
CANNABINOIDS UR QL SCN: NEGATIVE
CHLORIDE SERPL-SCNC: 106 MMOL/L (ref 100–111)
CO2 SERPL-SCNC: 26 MMOL/L (ref 21–32)
COCAINE UR QL SCN: NEGATIVE
CREAT SERPL-MCNC: 0.69 MG/DL (ref 0.6–1.3)
DIFFERENTIAL METHOD BLD: NORMAL
EOSINOPHIL # BLD: 0.1 K/UL (ref 0–0.4)
EOSINOPHIL NFR BLD: 2 % (ref 0–5)
ERYTHROCYTE [DISTWIDTH] IN BLOOD BY AUTOMATED COUNT: 12.2 % (ref 11.6–14.5)
ETHANOL SERPL-MCNC: <3 MG/DL (ref 0–3)
GLOBULIN SER CALC-MCNC: 4.1 G/DL (ref 2–4)
GLUCOSE SERPL-MCNC: 120 MG/DL (ref 74–99)
HCG UR QL: NEGATIVE
HCT VFR BLD AUTO: 43.4 % (ref 35–45)
HDSCOM,HDSCOM: NORMAL
HGB BLD-MCNC: 15 G/DL (ref 12–16)
LYMPHOCYTES # BLD: 2.1 K/UL (ref 0.9–3.6)
LYMPHOCYTES NFR BLD: 26 % (ref 21–52)
MCH RBC QN AUTO: 30.5 PG (ref 24–34)
MCHC RBC AUTO-ENTMCNC: 34.6 G/DL (ref 31–37)
MCV RBC AUTO: 88.4 FL (ref 74–97)
METHADONE UR QL: NEGATIVE
MONOCYTES # BLD: 0.6 K/UL (ref 0.05–1.2)
MONOCYTES NFR BLD: 8 % (ref 3–10)
NEUTS SEG # BLD: 5.2 K/UL (ref 1.8–8)
NEUTS SEG NFR BLD: 64 % (ref 40–73)
OPIATES UR QL: NEGATIVE
PCP UR QL: NEGATIVE
PLATELET # BLD AUTO: 247 K/UL (ref 135–420)
PMV BLD AUTO: 11.5 FL (ref 9.2–11.8)
POTASSIUM SERPL-SCNC: 4.1 MMOL/L (ref 3.5–5.5)
PROT SERPL-MCNC: 7.8 G/DL (ref 6.4–8.2)
RBC # BLD AUTO: 4.91 M/UL (ref 4.2–5.3)
SODIUM SERPL-SCNC: 140 MMOL/L (ref 136–145)
WBC # BLD AUTO: 8 K/UL (ref 4.6–13.2)

## 2019-09-03 PROCEDURE — 80307 DRUG TEST PRSMV CHEM ANLYZR: CPT

## 2019-09-03 PROCEDURE — 99282 EMERGENCY DEPT VISIT SF MDM: CPT

## 2019-09-03 PROCEDURE — 81025 URINE PREGNANCY TEST: CPT

## 2019-09-03 PROCEDURE — 74011250637 HC RX REV CODE- 250/637: Performed by: EMERGENCY MEDICINE

## 2019-09-03 PROCEDURE — 85025 COMPLETE CBC W/AUTO DIFF WBC: CPT

## 2019-09-03 PROCEDURE — 80053 COMPREHEN METABOLIC PANEL: CPT

## 2019-09-03 RX ORDER — LORAZEPAM 1 MG/1
2 TABLET ORAL
Status: COMPLETED | OUTPATIENT
Start: 2019-09-03 | End: 2019-09-03

## 2019-09-03 RX ADMIN — LORAZEPAM 2 MG: 1 TABLET ORAL at 22:03

## 2019-09-04 PROBLEM — F31.9 BIPOLAR DISORDER (HCC): Status: ACTIVE | Noted: 2019-09-04

## 2019-09-04 PROCEDURE — 74011250637 HC RX REV CODE- 250/637: Performed by: PSYCHIATRY & NEUROLOGY

## 2019-09-04 PROCEDURE — 65220000003 HC RM SEMIPRIVATE PSYCH

## 2019-09-04 RX ORDER — PANTOPRAZOLE SODIUM 40 MG/1
40 TABLET, DELAYED RELEASE ORAL
Status: DISCONTINUED | OUTPATIENT
Start: 2019-09-05 | End: 2019-09-16 | Stop reason: HOSPADM

## 2019-09-04 RX ORDER — IBUPROFEN 400 MG/1
400 TABLET ORAL
Status: DISCONTINUED | OUTPATIENT
Start: 2019-09-04 | End: 2019-09-16 | Stop reason: HOSPADM

## 2019-09-04 RX ORDER — LORAZEPAM 1 MG/1
1 TABLET ORAL 3 TIMES DAILY
Status: DISCONTINUED | OUTPATIENT
Start: 2019-09-04 | End: 2019-09-16 | Stop reason: HOSPADM

## 2019-09-04 RX ORDER — POLYETHYLENE GLYCOL 3350 17 G/17G
17 POWDER, FOR SOLUTION ORAL DAILY
Status: DISCONTINUED | OUTPATIENT
Start: 2019-09-04 | End: 2019-09-16 | Stop reason: HOSPADM

## 2019-09-04 RX ORDER — THERA TABS 400 MCG
1 TAB ORAL DAILY
Status: DISCONTINUED | OUTPATIENT
Start: 2019-09-04 | End: 2019-09-16 | Stop reason: HOSPADM

## 2019-09-04 RX ORDER — HALOPERIDOL 5 MG/1
5 TABLET ORAL
Status: DISCONTINUED | OUTPATIENT
Start: 2019-09-04 | End: 2019-09-16 | Stop reason: HOSPADM

## 2019-09-04 RX ORDER — OXCARBAZEPINE 300 MG/1
900 TABLET, FILM COATED ORAL 2 TIMES DAILY
Status: DISCONTINUED | OUTPATIENT
Start: 2019-09-04 | End: 2019-09-05

## 2019-09-04 RX ORDER — GLUCOSAM/CHONDRO/HERB 149/HYAL 750-100 MG
1 TABLET ORAL DAILY
Status: DISCONTINUED | OUTPATIENT
Start: 2019-09-04 | End: 2019-09-08 | Stop reason: CLARIF

## 2019-09-04 RX ORDER — HALOPERIDOL 5 MG/ML
5 INJECTION INTRAMUSCULAR
Status: DISCONTINUED | OUTPATIENT
Start: 2019-09-04 | End: 2019-09-16 | Stop reason: HOSPADM

## 2019-09-04 RX ORDER — TRAZODONE HYDROCHLORIDE 50 MG/1
50 TABLET ORAL
Status: DISCONTINUED | OUTPATIENT
Start: 2019-09-04 | End: 2019-09-16 | Stop reason: HOSPADM

## 2019-09-04 RX ORDER — DOCUSATE SODIUM 100 MG/1
100 CAPSULE, LIQUID FILLED ORAL 2 TIMES DAILY
Status: DISCONTINUED | OUTPATIENT
Start: 2019-09-04 | End: 2019-09-12

## 2019-09-04 RX ORDER — PRAVASTATIN SODIUM 20 MG/1
20 TABLET ORAL
Status: DISCONTINUED | OUTPATIENT
Start: 2019-09-04 | End: 2019-09-16 | Stop reason: HOSPADM

## 2019-09-04 RX ORDER — LORAZEPAM 2 MG/ML
1-2 INJECTION INTRAMUSCULAR
Status: DISCONTINUED | OUTPATIENT
Start: 2019-09-04 | End: 2019-09-16 | Stop reason: HOSPADM

## 2019-09-04 RX ADMIN — DOCUSATE SODIUM 100 MG: 100 CAPSULE, LIQUID FILLED ORAL at 20:32

## 2019-09-04 RX ADMIN — IBUPROFEN 400 MG: 400 TABLET ORAL at 18:28

## 2019-09-04 RX ADMIN — PRAVASTATIN SODIUM 20 MG: 20 TABLET ORAL at 20:32

## 2019-09-04 RX ADMIN — LORAZEPAM 1 MG: 1 TABLET ORAL at 13:01

## 2019-09-04 RX ADMIN — OXCARBAZEPINE 900 MG: 300 TABLET, FILM COATED ORAL at 20:32

## 2019-09-04 RX ADMIN — LORAZEPAM 1 MG: 1 TABLET ORAL at 20:33

## 2019-09-04 NOTE — PROGRESS NOTES
Patient admitted at this time with complaints of suicidal thoughts. Patient  has a  LD and states \" I have thoughts sometimes\". She lives in a group home. She has been having behaviorals at the home that indicate she may want to hurt herself. Described in the chart as running into traffic. Oriented to unit and guidelines.

## 2019-09-04 NOTE — ROUTINE PROCESS
TRANSFER - IN REPORT:    Verbal report received from [address] (name) on Aliza Mcrae  being received from Behavioral (unit) for routine progression of care      Report consisted of patients Situation, Background, Assessment and   Recommendations(SBAR). Information from the following report(s) ED Summary and Recent Results was reviewed with the receiving nurse. Opportunity for questions and clarification was provided. Assessment completed upon patients arrival to unit and care assumed.

## 2019-09-04 NOTE — BSMART NOTE
Comprehensive Assessment Form Part 1    Section I - Disposition    The plan is to have [facility] evaluate for TDO. The Medical Doctor, Darlyn Bhatt MD, is in agreement with disposition. Section II - Integrated Summary  The information is given by the patient and caregiver. The Chief Complaint is self harm. The Precipitant Factors are medication adjustments. Previous Hospitalizations: [facility] AND [facility] [place] 5/2017    Patient is a 26 y.o. female with history of  Bipolar d/o, ADHD, Mild ID and Borderline Personality d/o who presents with suicidal gestures. Group home staff advise patient has been attempting to run in front of cars. Patient denies this. Patient reports \"running into trucks. \" Patient advises she gets mad but is unable to articulate why she gets mad. Staff member reports patient has verbalized voices telling her to kill herself. Staff member also reports patient has been banging head, screaming, hollering and taking off her clothing. Patient denies these actions. Staff member reminded patient of her actions while in the ER. Patient reports she has been getting shots but does not feel they are helping. History of inpatient at [facility] AND [facility] [place] 5/2017 and [facility] 8/2017 both on a TDO and was connected with REACH services. Current medications: Biotin tab 100 mcg daily, Miralax 17 gm daily, Omega 3 fish oil 1000 mg daily, Protonix 40 mg every am, Pravachol 20 mg hs, Benzaclin gel 1-5% BID, Docusate Sodium 100 mg BID, Trileptal 900 mg BID, Ativan 2 mg TID, and Invega Sustena 156 mg inj monthly. The patient's appearance shows no evidence of impairment. The patient's behavior shows poor impulse control and is restless. The patient is only aware of  place and person. The patient's speech is pressured. The patient's mood  is anxious. The range of affect is flat. The thought process is tangential and perseveration. Patient has displays difficulty answering direct questions and interjects with information she wants you to know.  The patient's memory is impaired. The patient's sleep has evidence of insomnia. The patient shows no insight. The patient's judgement is cognitively impaired. Discusses TDO process with staff member and she is willing to petition. Discussed with on physician Dr Shannan Almanza, no appropriate bed at [facility] AND [facility] [place]. The patient is felt to be at risk for self harm or harm to others.          Caty Foote Mt
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
## 2019-09-04 NOTE — ED NOTES
Patient laying in bed alert and oriented to baseline. No signs of distress noted at this time. Did call Crisis to inform them that the TDO is here. Mariella at  took message. Will follow up to speak with Chaya Larios.

## 2019-09-04 NOTE — ED NOTES
Patient in results waiting area banging her head against the wall when asked to stop she did it harder and more frequent.

## 2019-09-04 NOTE — BH NOTES
States she is kind of \"shaky\". Eats and tolerates evening meal. Offers no complaints. ID. Withdrawn quiet and isolative. Does attend RN group. Gripper socks and 15 minute checks in place for safety. Gait appropriate. Takes medicines without incident. States [facility]. States SI HI sometimes. Will continue to monitor and support.

## 2019-09-05 PROBLEM — F31.9 BIPOLAR DISORDER (HCC): Status: RESOLVED | Noted: 2019-09-04 | Resolved: 2019-09-05

## 2019-09-05 PROBLEM — F31.64 SEVERE MIXED BIPOLAR I DISORDER W/PSYCHOTIC FEATURES, MOOD-INCONGRUENT (HCC): Status: ACTIVE | Noted: 2019-09-05

## 2019-09-05 PROCEDURE — 74011250636 HC RX REV CODE- 250/636: Performed by: PSYCHIATRY & NEUROLOGY

## 2019-09-05 PROCEDURE — 65220000003 HC RM SEMIPRIVATE PSYCH

## 2019-09-05 PROCEDURE — 74011250637 HC RX REV CODE- 250/637: Performed by: PSYCHIATRY & NEUROLOGY

## 2019-09-05 RX ORDER — PALIPERIDONE 3 MG/1
3 TABLET, EXTENDED RELEASE ORAL
Status: DISCONTINUED | OUTPATIENT
Start: 2019-09-05 | End: 2019-09-06

## 2019-09-05 RX ORDER — DIVALPROEX SODIUM 250 MG/1
500 TABLET, DELAYED RELEASE ORAL 2 TIMES DAILY
Status: DISCONTINUED | OUTPATIENT
Start: 2019-09-05 | End: 2019-09-10

## 2019-09-05 RX ADMIN — HALOPERIDOL LACTATE 5 MG: 5 INJECTION INTRAMUSCULAR at 17:04

## 2019-09-05 RX ADMIN — LORAZEPAM 1 MG: 1 TABLET ORAL at 08:03

## 2019-09-05 RX ADMIN — THERA TABS 1 TABLET: TAB at 08:03

## 2019-09-05 RX ADMIN — OXCARBAZEPINE 900 MG: 300 TABLET, FILM COATED ORAL at 08:03

## 2019-09-05 RX ADMIN — DOCUSATE SODIUM 100 MG: 100 CAPSULE, LIQUID FILLED ORAL at 08:03

## 2019-09-05 RX ADMIN — PALIPERIDONE 3 MG: 3 TABLET, EXTENDED RELEASE ORAL at 21:02

## 2019-09-05 RX ADMIN — DOCUSATE SODIUM 100 MG: 100 CAPSULE, LIQUID FILLED ORAL at 21:02

## 2019-09-05 RX ADMIN — LORAZEPAM 1 MG: 1 TABLET ORAL at 13:20

## 2019-09-05 RX ADMIN — PANTOPRAZOLE SODIUM 40 MG: 40 TABLET, DELAYED RELEASE ORAL at 06:31

## 2019-09-05 RX ADMIN — LORAZEPAM 2 MG: 2 INJECTION INTRAMUSCULAR; INTRAVENOUS at 17:04

## 2019-09-05 RX ADMIN — PRAVASTATIN SODIUM 20 MG: 20 TABLET ORAL at 21:02

## 2019-09-05 RX ADMIN — DIVALPROEX SODIUM 500 MG: 250 TABLET, DELAYED RELEASE ORAL at 21:02

## 2019-09-05 RX ADMIN — LORAZEPAM 1 MG: 1 TABLET ORAL at 21:04

## 2019-09-05 NOTE — BH NOTES
Approximately at 1700 pt had a behavioral outburst in the conference room where pt began crying and shouting while attempting to harm herself. Pt was sitting on the floor in the conference room slapping her arms & legs while knocking her head against a wall repeatedly. Pt was able to calm herself down and eat dinner after receiving medication via IM. Pt shouted, \"I need help, I don't want to talk to any of y'all. \" Staff was able to ambulate pt to her room where she was able to regulate her mood.

## 2019-09-05 NOTE — PROGRESS NOTES
Problem: Suicide  Goal: *STG: Remains safe in hospital  Description  Patient will be assessed daily for safety. Outcome: Progressing Towards Goal  Goal: *STG: Seeks staff when feelings of self harm or harm towards others arise  Description  Patient assessed daily for suicidal thoughts ,contract for safety. Outcome: Progressing Towards Goal  Goal: *STG: Attends activities and groups  Description  Patient will be encouraged to attend groups. Outcome: Progressing Towards Goal  Goal: *STG/LTG: Complies with medication therapy  Description  Patient will take prescribed medication daily. Outcome: Progressing Towards Goal  Patient states she is doing ok. She need re direction from staff. Childlike in her behavior.
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
## 2019-09-05 NOTE — BSMART NOTE
ART THERAPY GROUP PROGRESS NOTE    PATIENT SCHEDULED FOR GROUP AT: 13:30    ATTENDANCE: Full    PARTICIPATION LEVEL: Participates fully in the art process    ATTENTION LEVEL: Needs occasional re-direction    FOCUS: Problem-solving    SYMBOLIC & THEMATIC CONTENT AS NOTED IN IMAGERY: She was cheerful and child-like. She speaks loudly and needs reminders to mind her volume. She has difficulty waiting for directives and often interrupts others. Social skills are poor and significant cognitive deficits are evident.

## 2019-09-05 NOTE — BSMART NOTE
OCCUPATIONAL THERAPY PROGRESS NOTE    Group Time:  0236  Attendance: The patient attended full group. .  Participation:  The patient participated fully in the activity. Attention:  The patient needed frequent redirection to activity. Interaction:  The patient frequently interacts with others. Easily distracted, little awareness of others answers. Needed help with reading of questions and meaning of some words/concepts. Pleasant and cooperative when redirected.

## 2019-09-05 NOTE — BH NOTES
Patient in conference room crying, screaming and banging her head against wall repeatedly. Patient unable to be redirected. She received  Haldol 5 mg IM and Ativan 2 mg IM at 1704. Writer offered to process with patient. \"I don't want to talk to yall because I got a shot. I need help because I'm sick in the head\". She was encouraged to express her feelings. She went to staff and apologized for her behavior. \"I'm okay. I'm going to be good\".

## 2019-09-06 LAB
CHOLEST SERPL-MCNC: 231 MG/DL
HBA1C MFR BLD: 5.3 % (ref 4.2–5.6)
HDLC SERPL-MCNC: 38 MG/DL (ref 40–60)
HDLC SERPL: 6.1 {RATIO} (ref 0–5)
LDLC SERPL CALC-MCNC: 168 MG/DL (ref 0–100)
LIPID PROFILE,FLP: ABNORMAL
TRIGL SERPL-MCNC: 125 MG/DL (ref ?–150)
TSH SERPL DL<=0.05 MIU/L-ACNC: 1.44 UIU/ML (ref 0.36–3.74)
VLDLC SERPL CALC-MCNC: 25 MG/DL

## 2019-09-06 PROCEDURE — 74011250637 HC RX REV CODE- 250/637: Performed by: PSYCHIATRY & NEUROLOGY

## 2019-09-06 PROCEDURE — 84443 ASSAY THYROID STIM HORMONE: CPT

## 2019-09-06 PROCEDURE — 80061 LIPID PANEL: CPT

## 2019-09-06 PROCEDURE — 36415 COLL VENOUS BLD VENIPUNCTURE: CPT

## 2019-09-06 PROCEDURE — 65220000003 HC RM SEMIPRIVATE PSYCH

## 2019-09-06 PROCEDURE — 83036 HEMOGLOBIN GLYCOSYLATED A1C: CPT

## 2019-09-06 RX ORDER — HALOPERIDOL 5 MG/1
5 TABLET ORAL 2 TIMES DAILY
Status: DISCONTINUED | OUTPATIENT
Start: 2019-09-06 | End: 2019-09-09

## 2019-09-06 RX ADMIN — LORAZEPAM 1 MG: 1 TABLET ORAL at 08:09

## 2019-09-06 RX ADMIN — PRAVASTATIN SODIUM 20 MG: 20 TABLET ORAL at 21:02

## 2019-09-06 RX ADMIN — LORAZEPAM 1 MG: 1 TABLET ORAL at 21:03

## 2019-09-06 RX ADMIN — LORAZEPAM 1 MG: 1 TABLET ORAL at 13:41

## 2019-09-06 RX ADMIN — HALOPERIDOL 5 MG: 5 TABLET ORAL at 21:06

## 2019-09-06 RX ADMIN — DOCUSATE SODIUM 100 MG: 100 CAPSULE, LIQUID FILLED ORAL at 08:09

## 2019-09-06 RX ADMIN — PANTOPRAZOLE SODIUM 40 MG: 40 TABLET, DELAYED RELEASE ORAL at 06:46

## 2019-09-06 RX ADMIN — DIVALPROEX SODIUM 500 MG: 250 TABLET, DELAYED RELEASE ORAL at 08:09

## 2019-09-06 RX ADMIN — DOCUSATE SODIUM 100 MG: 100 CAPSULE, LIQUID FILLED ORAL at 21:03

## 2019-09-06 RX ADMIN — DIVALPROEX SODIUM 500 MG: 250 TABLET, DELAYED RELEASE ORAL at 21:03

## 2019-09-06 RX ADMIN — THERA TABS 1 TABLET: TAB at 08:10

## 2019-09-06 NOTE — BH NOTES
Patient was restless and sad, she asked staff to explain what does Tdo mean, staff explained to  Her, she got upset when she found out that she had to go to court,she banged her head on the wall, staff had to stop her from hurting herself, she was medicated to calm her down, she was encouraged by staff. Staff will continue to monitor patient for safety.

## 2019-09-06 NOTE — PROGRESS NOTES
Problem: Falls - Risk of  Goal: *Absence of Falls  Description  Document Sherrie Estrada Fall Risk and appropriate interventions in the flowsheet daily,. Outcome: Progressing Towards Goal  Note:   Fall Risk Interventions:            Medication Interventions: Teach patient to arise slowly                   Problem: Suicide  Goal: *STG: Remains safe in hospital  Description  Patient will be assessed daily for safety. Outcome: Progressing Towards Goal  Goal: *STG: Seeks staff when feelings of self harm or harm towards others arise  Description  Patient assessed daily for suicidal thoughts ,contract for safety. Outcome: Progressing Towards Goal  Goal: *STG: Attends activities and groups  Description  Patient will be encouraged to attend groups. Outcome: Progressing Towards Goal   Patient denies feelings of self harm. She does need re direction from staff for childlike behaviors. She is taking her medications, attending groups.

## 2019-09-06 NOTE — BSMART NOTE
OCCUPATIONAL THERAPY PROGRESS NOTE    Group Time:  3498  Attendance: The patient attended full group. .  Participation:  The patient participated with moderate elaboration in the activity. Attention:  The patient needed redirection to activity at least once. .  Interaction:  The patient frequently interacts with others. Not as distracted as yesterday. Camino ability to participate only. Responded well to redirection.

## 2019-09-06 NOTE — GROUP NOTE
ARLE[facility]  GROUP DOCUMENTATION INDIVIDUAL                                                                          Group Therapy Note    Date: September 6    Group Start Time: 1200  Group End Time: 3891  Group Topic: Nursing    [facility] SPECIAL TRTMT Yoli Loya [address], RN    IP [address] [facility] GROUP DOCUMENTATION GROUP    Group Therapy Note    Attendees: 5         Attendance: Attended    Patient's Goal: Discharge Planning    Interventions/techniques: Informed    Follows Directions:  Followed directions    Interactions: Interacted appropriately    Mental Status: Calm    Behavior/appearance: Cooperative    Goals Achieved: Able to engage in interactions      Additional Notes:  Discussed discharge planning and coping skills    Ean Pires RN
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
## 2019-09-06 NOTE — PROGRESS NOTES
The pt was seen individually and her case was discussed with staff. During session today, we confirmed the fact that haloperidol helps with her AH and agitation. Hence, taking in consideration that the first generation antipsychotic have less metabolic effects, we will proceed with discontinue the paliperidone oral dose, and will start haloperidol, 5 mg BID orally. Please see orders. 24-Hr Vitals/Weight (last day)     Date/Time Temp Pulse BP MAP (Calculated) BP 1 Location BP Patient Position Resp SpO2 O2 Device O2 Flow Rate (L/min) Level of Consciousness MEWS Score Weight   09/06/19 0814 98.2 °F (36.8 °C) 94 120/82 95 -- -- 18 -- -- -- Alert 1 --   09/05/19 2204 98.7 °F (37.1 °C) 87 129/93Abnormal  105 Right arm -- 18 -- -- -- Alert 1 --   09/05/19 0754 97.5 °F (36.4 °C) 107Abnormal  115/78 90 Right arm At rest 20 -- -- -- Alert 2 --     The higher than normal BP appears to be related to agitation. The pt had problems with control loss last evening, requiring meds. She is calmer today, however she remains psychotic. Will see again tomorrow. Otherwise, labs drawn today, showed the following results:    Yuli Diana, [address] #[id number] ([place]: [de-identified]) (26 y.o. F) (Adm: 09/03/19)   [id number]   24-Hr All Comp Based Labs    (Last 24 hours)   Date/Time Component Value    09/06/19 0610 LIPID PROFILE  Details   09/06/19 0610 Cholesterol, total 231  Details   09/06/19 0610 Triglyceride 125 Details   09/06/19 0610 HDL Cholesterol 38  Details   09/06/19 0610 LDL, calculated 168  Details   09/06/19 0610 VLDL, calculated 25 Details   09/06/19 0610 CHOL/HDL Ratio 6.1  Details   09/06/19 0610 Hemoglobin A1c, (calculated) 5.3 Details   09/06/19 0610 TSH 1.44 Details     Several abnormal results are noticed. Of most importance is that the pt's A1c showed normal results. So, she is not a diabetic. She is overweight, and has mild elevation of her cholesterol, with a low HDL.  This again supports her paliperidone not being increased due to concerns about hyperlipidimia in addition to further weight increase.

## 2019-09-06 NOTE — BSMART NOTE
ART THERAPY GROUP PROGRESS NOTE    PATIENT SCHEDULED FOR GROUP AT: 10:10    ATTENDANCE: Full    PARTICIPATION LEVEL: Participates fully in the art process. ATTENTION LEVEL: Able to focus on task. FOCUS: Cognitive    SYMBOLIC & THEMATIC CONTENT AS NOTED IN IMAGERY: She presented with a calm mood and flat affect. Her thought processes were concrete and disorganized throughout the group. She was actively engaged in the art process and engaged in discussion with other group members. Her artwork was disorganized and not appropriate for developmental skill level. She showed regression both in artwork and mannerisms.
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
## 2019-09-07 PROCEDURE — 90471 IMMUNIZATION ADMIN: CPT

## 2019-09-07 PROCEDURE — 74011250637 HC RX REV CODE- 250/637: Performed by: PSYCHIATRY & NEUROLOGY

## 2019-09-07 PROCEDURE — 65220000003 HC RM SEMIPRIVATE PSYCH

## 2019-09-07 PROCEDURE — 74011250636 HC RX REV CODE- 250/636: Performed by: PSYCHIATRY & NEUROLOGY

## 2019-09-07 PROCEDURE — 90686 IIV4 VACC NO PRSV 0.5 ML IM: CPT | Performed by: PSYCHIATRY & NEUROLOGY

## 2019-09-07 RX ADMIN — LORAZEPAM 1 MG: 1 TABLET ORAL at 13:39

## 2019-09-07 RX ADMIN — LORAZEPAM 1 MG: 1 TABLET ORAL at 08:10

## 2019-09-07 RX ADMIN — POLYETHYLENE GLYCOL 3350 17 G: 17 POWDER, FOR SOLUTION ORAL at 08:13

## 2019-09-07 RX ADMIN — PRAVASTATIN SODIUM 20 MG: 20 TABLET ORAL at 20:05

## 2019-09-07 RX ADMIN — DOCUSATE SODIUM 100 MG: 100 CAPSULE, LIQUID FILLED ORAL at 08:09

## 2019-09-07 RX ADMIN — DIVALPROEX SODIUM 500 MG: 250 TABLET, DELAYED RELEASE ORAL at 08:09

## 2019-09-07 RX ADMIN — IBUPROFEN 400 MG: 400 TABLET ORAL at 08:11

## 2019-09-07 RX ADMIN — HALOPERIDOL 5 MG: 5 TABLET ORAL at 15:58

## 2019-09-07 RX ADMIN — THERA TABS 1 TABLET: TAB at 08:10

## 2019-09-07 RX ADMIN — HALOPERIDOL 5 MG: 5 TABLET ORAL at 20:05

## 2019-09-07 RX ADMIN — DIVALPROEX SODIUM 500 MG: 250 TABLET, DELAYED RELEASE ORAL at 20:05

## 2019-09-07 RX ADMIN — PANTOPRAZOLE SODIUM 40 MG: 40 TABLET, DELAYED RELEASE ORAL at 06:31

## 2019-09-07 RX ADMIN — LORAZEPAM 1 MG: 1 TABLET ORAL at 15:58

## 2019-09-07 RX ADMIN — DOCUSATE SODIUM 100 MG: 100 CAPSULE, LIQUID FILLED ORAL at 20:05

## 2019-09-07 RX ADMIN — INFLUENZA VIRUS VACCINE 0.5 ML: 15; 15; 15; 15 SUSPENSION INTRAMUSCULAR at 13:34

## 2019-09-07 RX ADMIN — LORAZEPAM 1 MG: 1 TABLET ORAL at 20:05

## 2019-09-07 RX ADMIN — HALOPERIDOL 5 MG: 5 TABLET ORAL at 08:09

## 2019-09-07 NOTE — PROGRESS NOTES
The pt was seen in psych rounds today. Her case was discussed with staff. Still labile at times, valproate levels will be drawn on several days. For now, will maintain the same dose of Depakote DR, the same as the same dose of haloperidol orally at 5 mg BID. Will be also confirming when her dose of [place] of 156 mg is due. 24-Hr Vitals/Weight (last day)     Date/Time Temp Pulse BP MAP (Calculated) BP 1 Location BP Patient Position Resp SpO2 O2 Device O2 Flow Rate (L/min) Level of Consciousness MEWS Score Weight   09/07/19 0827 97.1 °F (36.2 °C) 102Abnormal  124/89 101 Right arm Sitting 16 -- -- -- Alert 2 --   09/06/19 0814 98.2 °F (36.8 °C) 94 120/82 95 -- -- 18 -- -- -- Alert 1 --     Vitals showed a mild elevation of her BP and HR. This could be related to agitation, which happens regularly without a trigger. Will see again tomorrow.

## 2019-09-07 NOTE — PROGRESS NOTES
Problem: Falls - Risk of  Goal: *Absence of Falls  Description  Document Aisha Torres Fall Risk and appropriate interventions in the flowsheet daily,. Outcome: Progressing Towards Goal  Note:   Fall Risk Interventions:  Medication Interventions: Teach patient to arise slowly  Pt remains free of falls. Problem: Suicide  Goal: *STG: Remains safe in hospital  Description  Patient will be assessed daily for safety. Outcome: Progressing Towards Goal  Note:   Pt remains safe. Goal: *STG: Seeks staff when feelings of self harm or harm towards others arise  Description  Patient assessed daily for suicidal thoughts ,contract for safety. Outcome: Progressing Towards Goal  Note:   Pt contracts for safety. Patient has been in the milieu all shift until just recently when she got mad and walked off to her room because she decided she was ready to leave. Then she wanted this writer to call her group home for snacks and when she was told I would not do that she got upset and is now refusing her Ativan dose. She still expresses suicidal thoughts because she wants to be with her mother who she believes is in heaven. Otherwise she has been appropriate all shift.

## 2019-09-08 PROCEDURE — 65220000003 HC RM SEMIPRIVATE PSYCH

## 2019-09-08 PROCEDURE — 74011250637 HC RX REV CODE- 250/637: Performed by: PSYCHIATRY & NEUROLOGY

## 2019-09-08 RX ORDER — OMEGA-3-ACID ETHYL ESTERS 1 G/1
1 CAPSULE, LIQUID FILLED ORAL DAILY
Status: DISCONTINUED | OUTPATIENT
Start: 2019-09-08 | End: 2019-09-16 | Stop reason: HOSPADM

## 2019-09-08 RX ADMIN — DOCUSATE SODIUM 100 MG: 100 CAPSULE, LIQUID FILLED ORAL at 09:04

## 2019-09-08 RX ADMIN — HALOPERIDOL 5 MG: 5 TABLET ORAL at 20:01

## 2019-09-08 RX ADMIN — DIVALPROEX SODIUM 500 MG: 250 TABLET, DELAYED RELEASE ORAL at 20:01

## 2019-09-08 RX ADMIN — LORAZEPAM 1 MG: 1 TABLET ORAL at 20:02

## 2019-09-08 RX ADMIN — DOCUSATE SODIUM 100 MG: 100 CAPSULE, LIQUID FILLED ORAL at 20:02

## 2019-09-08 RX ADMIN — PANTOPRAZOLE SODIUM 40 MG: 40 TABLET, DELAYED RELEASE ORAL at 09:04

## 2019-09-08 RX ADMIN — DIVALPROEX SODIUM 500 MG: 250 TABLET, DELAYED RELEASE ORAL at 09:04

## 2019-09-08 RX ADMIN — LORAZEPAM 1 MG: 1 TABLET ORAL at 14:26

## 2019-09-08 RX ADMIN — THERA TABS 1 TABLET: TAB at 09:04

## 2019-09-08 RX ADMIN — OMEGA-3-ACID ETHYL ESTERS 1 CAPSULE: 1 CAPSULE, LIQUID FILLED ORAL at 10:07

## 2019-09-08 RX ADMIN — POLYETHYLENE GLYCOL 3350 17 G: 17 POWDER, FOR SOLUTION ORAL at 10:07

## 2019-09-08 RX ADMIN — PRAVASTATIN SODIUM 20 MG: 20 TABLET ORAL at 20:01

## 2019-09-08 RX ADMIN — HALOPERIDOL 5 MG: 5 TABLET ORAL at 09:04

## 2019-09-08 RX ADMIN — LORAZEPAM 1 MG: 1 TABLET ORAL at 09:04

## 2019-09-08 NOTE — BH NOTES
The patient spent the shift interacting with her peers and staff. She was in an upbeat mood and approachable. She ate all of her dinner and was not a management issue during this shift. Staff will continue to monitor the patient for safety.

## 2019-09-08 NOTE — PROGRESS NOTES
Problem: Falls - Risk of  Goal: *Absence of Falls  Description  Document Travis Boeck Fall Risk and appropriate interventions in the flowsheet daily,. Outcome: Progressing Towards Goal  Note:   Fall Risk Interventions:            Medication Interventions: Teach patient to arise slowly                   Problem: Suicide  Goal: *STG: Remains safe in hospital  Description  Patient will be assessed daily for safety. Outcome: Progressing Towards Goal  Goal: *STG/LTG: Complies with medication therapy  Description  Patient will take prescribed medication daily. Outcome: Progressing Towards Goal       Pt presents euthymic. Pt frequently asks questions r/t the [facility] program and when she might be going to the program and she has been encouraged to discuss this matter with SW tomorrow. Pt has been needy and intrusive at times but is easily redirectable. Pt has not been a behavioral management issue this shift. Pt offers no c/o si/hi and avh and displays no behaviors that would indicate si/hi and avh.

## 2019-09-08 NOTE — PROGRESS NOTES
The pt was seen individually and her case was discussed with staff. Somewhat obsessive, the pt is continuing to verbalize about participating in the [facility] program. For her to do so, she has to have psych testing that shows her suffering with an ID. Clinically, her IQ is lower than normal, however psych testing data is apparently a requirement. 24-Hr Vitals/Weight (last day)     Date/Time Temp Pulse BP MAP (Calculated) BP 1 Location BP Patient Position Resp SpO2 O2 Device O2 Flow Rate (L/min) Level of Consciousness MEWS Score Weight   09/08/19 0904 98 °F (36.7 °C) 100 129/72 91 Right arm Sitting 16 -- -- -- Alert 1 --   09/07/19 2020 97.4 °F (36.3 °C) 104Abnormal  115/85 95 Right arm Sitting 18 -- -- -- Alert 2 --   09/07/19 0827 97.1 °F (36.2 °C) 102Abnormal  124/89 101 Right arm Sitting 16 -- -- -- Alert 2 --     Will see again tomorrow. Still very labile, valproic levels in a couple of days.

## 2019-09-08 NOTE — BH NOTES
The patient was monitored for safety. Mood was anxious at times. Pt needed less prompts to stay focused and in control of her behavior. Pt was able to talk with her Dr. Brandon Severe was able to have a visitor from where she lives. Pt had clothes brought in to her. Pt was able to eat all meals. Staff will continue to monitor for safety and locations.

## 2019-09-09 PROCEDURE — 74011250637 HC RX REV CODE- 250/637: Performed by: PSYCHIATRY & NEUROLOGY

## 2019-09-09 PROCEDURE — 65220000003 HC RM SEMIPRIVATE PSYCH

## 2019-09-09 RX ORDER — HALOPERIDOL 10 MG/1
10 TABLET ORAL
Status: DISCONTINUED | OUTPATIENT
Start: 2019-09-09 | End: 2019-09-11

## 2019-09-09 RX ORDER — HALOPERIDOL 5 MG/1
5 TABLET ORAL DAILY
Status: DISCONTINUED | OUTPATIENT
Start: 2019-09-10 | End: 2019-09-11

## 2019-09-09 RX ADMIN — HALOPERIDOL 10 MG: 10 TABLET ORAL at 20:27

## 2019-09-09 RX ADMIN — IBUPROFEN 400 MG: 400 TABLET ORAL at 12:07

## 2019-09-09 RX ADMIN — LORAZEPAM 1 MG: 1 TABLET ORAL at 08:00

## 2019-09-09 RX ADMIN — LORAZEPAM 1 MG: 1 TABLET ORAL at 20:26

## 2019-09-09 RX ADMIN — THERA TABS 1 TABLET: TAB at 08:04

## 2019-09-09 RX ADMIN — DOCUSATE SODIUM 100 MG: 100 CAPSULE, LIQUID FILLED ORAL at 08:05

## 2019-09-09 RX ADMIN — OMEGA-3-ACID ETHYL ESTERS 1 CAPSULE: 1 CAPSULE, LIQUID FILLED ORAL at 08:04

## 2019-09-09 RX ADMIN — DIVALPROEX SODIUM 500 MG: 250 TABLET, DELAYED RELEASE ORAL at 20:27

## 2019-09-09 RX ADMIN — DOCUSATE SODIUM 100 MG: 100 CAPSULE, LIQUID FILLED ORAL at 20:27

## 2019-09-09 RX ADMIN — PANTOPRAZOLE SODIUM 40 MG: 40 TABLET, DELAYED RELEASE ORAL at 06:33

## 2019-09-09 RX ADMIN — DIVALPROEX SODIUM 500 MG: 250 TABLET, DELAYED RELEASE ORAL at 08:04

## 2019-09-09 RX ADMIN — HALOPERIDOL 5 MG: 5 TABLET ORAL at 08:04

## 2019-09-09 RX ADMIN — PRAVASTATIN SODIUM 20 MG: 20 TABLET ORAL at 20:27

## 2019-09-09 RX ADMIN — LORAZEPAM 1 MG: 1 TABLET ORAL at 13:27

## 2019-09-09 NOTE — PROGRESS NOTES
The pt was IC for IP care. She was seen individually and her case was discussed with staff. During session today, she described the presence of AH being still present. She is very hopeful that [facility] will accept her as a patient. 24-Hr Vitals/Weight (last day)     Date/Time Temp Pulse BP MAP (Calculated) BP 1 Location BP Patient Position Resp SpO2 O2 Device O2 Flow Rate (L/min) Level of Consciousness MEWS Score Weight   09/09/19 0810 99 °F (37.2 °C) 105Abnormal  129/78 95 -- -- 16 -- -- -- Alert 2 --   09/08/19 1928 97.9 °F (36.6 °C) 107Abnormal  135/98Abnormal  110 Right arm Sitting 16 -- -- -- Alert 2 --   09/08/19 0904 98 °F (36.7 °C) 100 129/72 91 Right arm Sitting 16 -- -- -- Alert 1 --     Vials are stable, however the presence of tachycardia noticed. Doubt any cardiac abnormalities, the increased HR possibly associated to her psychotic process, (increased dopaminergic activity). Thyroid profile is normal. Will see again tomorrow.

## 2019-09-09 NOTE — BSMART NOTE
OCCUPATIONAL THERAPY PROGRESS NOTE    Group Time:  0778  Attendance: The patient attended full group. .  Participation:  The patient participated with moderate elaboration in the activity. Attention:  The patient needed redirection to activity at least once. Interaction:  The patient frequently interacts with others. Remains easily distracted and limited awareness of others comments/answers. Easily redirected and not intrusive in group. Affect bright.

## 2019-09-09 NOTE — BSMART NOTE
Pt. Is a 26year old female with history of Bipolar Disorder, Border personality Disorder and Mild IDD.  Pt. Was hospitalized for having auditory hallucinations and ideations to harm self.     Pt.'s case was discussed with the treating psychiatrist. Kim Dorsey will explore stepping pt down to REACH services. Pt. Tawana Teran to court this keisha santoyo . Pt. Was PRANAV GRANADOS Contact:  DARWIN met with pt to discuss dc planning. Pt expressed she would like to work on medications and behaviors. SW reviewed pt.'s treatment goals. . Pt was arlette to identify treatment goals: decrease aggression  Not hit self and others, take medications and do not call people on the phone all day. SW provided the pt  with positive feedback and positive verbal praise. SW will continue to assist the pt with dc planning.

## 2019-09-09 NOTE — PROGRESS NOTES
Problem: Falls - Risk of  Goal: *Absence of Falls  Description  Document Margrett Bernheim Fall Risk and appropriate interventions in the flowsheet daily,. Outcome: Progressing Towards Goal  Note:   Fall Risk Interventions:            Medication Interventions: Teach patient to arise slowly                   Problem: Suicide  Goal: *STG: Remains safe in hospital  Description  Patient will be assessed daily for safety. Outcome: Progressing Towards Goal  Goal: *STG: Seeks staff when feelings of self harm or harm towards others arise  Description  Patient assessed daily for suicidal thoughts ,contract for safety. Outcome: Progressing Towards Goal  Goal: *STG: Attends activities and groups  Description  Patient will be encouraged to attend groups. Outcome: Progressing Towards Goal  Patient states she is doing better. Childlike with her behaviors. Need redriection from staff . Encouraged to use her coping skills to deal with anxiety.

## 2019-09-09 NOTE — BSMART NOTE
ART THERAPY GROUP PROGRESS NOTE    PATIENT SCHEDULED FOR GROUP AT: 1078    ATTENDANCE: Full    PARTICIPATION LEVEL: Participates fully in the art process    ATTENTION LEVEL: Able to focus on task    FOCUS: 113 Lore Mancini: She was calm, compliant, and cheerful. She was invested in the task at hand and interacted with peers. She was child like and needed some re-direction. She claimed her goal is \"not to run away anymore because it's dangerous. \"

## 2019-09-10 LAB
ALBUMIN SERPL-MCNC: 2.9 G/DL (ref 3.4–5)
ALBUMIN/GLOB SERPL: 0.9 {RATIO} (ref 0.8–1.7)
ALP SERPL-CCNC: 136 U/L (ref 45–117)
ALT SERPL-CCNC: 22 U/L (ref 13–56)
AST SERPL-CCNC: 11 U/L (ref 10–38)
BILIRUB DIRECT SERPL-MCNC: <0.1 MG/DL (ref 0–0.2)
BILIRUB SERPL-MCNC: 0.6 MG/DL (ref 0.2–1)
GLOBULIN SER CALC-MCNC: 3.1 G/DL (ref 2–4)
PROT SERPL-MCNC: 6 G/DL (ref 6.4–8.2)
VALPROATE SERPL-MCNC: 52 UG/ML (ref 50–100)

## 2019-09-10 PROCEDURE — 74011250637 HC RX REV CODE- 250/637: Performed by: PSYCHIATRY & NEUROLOGY

## 2019-09-10 PROCEDURE — 65220000003 HC RM SEMIPRIVATE PSYCH

## 2019-09-10 PROCEDURE — 80164 ASSAY DIPROPYLACETIC ACD TOT: CPT

## 2019-09-10 PROCEDURE — 80076 HEPATIC FUNCTION PANEL: CPT

## 2019-09-10 PROCEDURE — 36415 COLL VENOUS BLD VENIPUNCTURE: CPT

## 2019-09-10 RX ORDER — DIVALPROEX SODIUM 250 MG/1
750 TABLET, DELAYED RELEASE ORAL 2 TIMES DAILY
Status: DISCONTINUED | OUTPATIENT
Start: 2019-09-10 | End: 2019-09-16

## 2019-09-10 RX ADMIN — PRAVASTATIN SODIUM 20 MG: 20 TABLET ORAL at 20:11

## 2019-09-10 RX ADMIN — DOCUSATE SODIUM 100 MG: 100 CAPSULE, LIQUID FILLED ORAL at 20:11

## 2019-09-10 RX ADMIN — LORAZEPAM 1 MG: 1 TABLET ORAL at 13:22

## 2019-09-10 RX ADMIN — DIVALPROEX SODIUM 500 MG: 250 TABLET, DELAYED RELEASE ORAL at 08:09

## 2019-09-10 RX ADMIN — HALOPERIDOL 10 MG: 10 TABLET ORAL at 20:11

## 2019-09-10 RX ADMIN — HALOPERIDOL 5 MG: 5 TABLET ORAL at 14:42

## 2019-09-10 RX ADMIN — OMEGA-3-ACID ETHYL ESTERS 1 CAPSULE: 1 CAPSULE, LIQUID FILLED ORAL at 08:09

## 2019-09-10 RX ADMIN — THERA TABS 1 TABLET: TAB at 08:09

## 2019-09-10 RX ADMIN — HALOPERIDOL 5 MG: 5 TABLET ORAL at 08:11

## 2019-09-10 RX ADMIN — DOCUSATE SODIUM 100 MG: 100 CAPSULE, LIQUID FILLED ORAL at 08:09

## 2019-09-10 RX ADMIN — LORAZEPAM 1 MG: 1 TABLET ORAL at 20:11

## 2019-09-10 RX ADMIN — PANTOPRAZOLE SODIUM 40 MG: 40 TABLET, DELAYED RELEASE ORAL at 06:47

## 2019-09-10 RX ADMIN — DIVALPROEX SODIUM 750 MG: 250 TABLET, DELAYED RELEASE ORAL at 20:11

## 2019-09-10 RX ADMIN — LORAZEPAM 1 MG: 1 TABLET ORAL at 08:09

## 2019-09-10 NOTE — BH NOTES
Pt was active on the unit, walking around. Pt was able to interact with peers positively. Pt interacted with staff at a minimum but positively. Pt displayed frustration while on the phone, saying that she wanted to leave. Pt did not have any outbursts or negative behaviors after hanging up. Pt was able to eat meals and comply to unit protocols.

## 2019-09-10 NOTE — PROGRESS NOTES
Problem: Falls - Risk of  Goal: *Absence of Falls  Description  Patient will remain fall free during hospital stay  Document Fausto Fall Risk and appropriate interventions in the flowsheet daily,. Outcome: Progressing Towards Goal  Note:   Fall Risk Interventions:            Medication Interventions: Teach patient to arise slowly                   Problem: Suicide  Goal: *STG: Remains safe in hospital  Description  Patient will be assessed daily for safety. Outcome: Progressing Towards Goal  Goal: *STG: Seeks staff when feelings of self harm or harm towards others arise  Description  Patient assessed daily for suicidal thoughts ,contract for safety. Outcome: Progressing Towards Goal  Goal: *STG: Attends activities and groups  Description  Patient will be encouraged to attend groups. Outcome: Progressing Towards Goal   Patient is anxious this morning, she was able to utilize her coping skills and calmed down. Attending groups. Medication complaint.

## 2019-09-10 NOTE — PROGRESS NOTES
The pt was seen individually and her case was discussed with staff. Still labile at times, her valproate blood levels, (see below), are not therapeutic. Otherwise, VS are stable. 24-Hr Vitals/Weight (last day)     Date/Time Temp Pulse BP MAP (Calculated) BP 1 Location BP Patient Position Resp SpO2 O2 Device O2 Flow Rate (L/min) Level of Consciousness MEWS Score Weight   09/10/19 0820 98.5 °F (36.9 °C) 84 118/85 96 -- -- 16 -- -- -- Alert 1 --   09/09/19 1959 97.1 °F (36.2 °C) 100 108/77 87 Right arm Sitting 16 -- -- -- Alert 1 --   09/09/19 0810 99 °F (37.2 °C) 105Abnormal  129/78 95 -- -- 16 -- -- -- Alert 2 --     Mod increase of HR noticed yesterday, better today. Otherwise, there is no evidence of meds related side effects, this after her haloperidol dose was increased. [address] Alamois Su Korey Lassiter Nett #[id number] (Acct: [de-identified]) (26 y.o. F) (Adm: 09/03/19)   [id number]   24-Hr All Comp Based Labs    (Last 24 hours)   Date/Time Component Value    09/10/19 0605 Valproic acid 52 Details   09/10/19 0605 Protein, total 6.0  Details   09/10/19 0605 Albumin 2.9  Details   09/10/19 0605 Globulin 3.1 Details   09/10/19 0605 A-G Ratio 0.9 Details   09/10/19 0605 Bilirubin, total 0.6 Details   09/10/19 0605 Bilirubin, direct <0.1 Details   09/10/19 0605 Alk. phosphatase 136  Details   09/10/19 0605 AST 11 Details   09/10/19 0605 ALT (SGPT) 22 Details     LFTs showed a decrease of total protein and mild elevation of alk phos. We will proceed to increase her Depakote DR dose to 750 mg BID. Blood levels in 3 days or so.

## 2019-09-10 NOTE — BSMART NOTE
Pt. Is a 26year old female with history of Bipolar Disorder, Border personality Disorder and Mild IDD.  Pt. Was hospitalized for having auditory hallucinations and ideations to harm self.     Pt.'s case was discussed with the treating psychiatrist. Julio Rosa will explore stepping pt down to REACH services.      SW Contact:  SW met with pt to discuss dc planning. Pt. Expressed she was upset because her father is upset that she is in the hospital.  SW discussed using positive coping skills. SW had pt to reflect on treatment goals. SW discussed the importance of demonstrating positive behaviors oppose to negative ones. Pt. Is able to process treatment. Pt. Expressed she will work on not harming self , so she can go back to the group home  or REACH. Pt. Attempts to maintain self control. Pt. Does adhere to redirection and or positive verbal praise. Pt. 's mood is slowly improving. SW will continue to assist the pt with dc planning.

## 2019-09-10 NOTE — BSMART NOTE
ART THERAPY GROUP PROGRESS NOTE    PATIENT SCHEDULED FOR GROUP AT: 9:30    ATTENDANCE: 1/4    PARTICIPATION LEVEL: Participates fully in the art process. ATTENTION LEVEL: Needs occasional re-direction. FOCUS: Self Affirmation    SYMBOLIC & THEMATIC CONTENT AS NOTED IN IMAGERY: She presented with an agitated mood and congruent affect. She worked quickly in the creative art making process, she completed her work and immediately and left the group. Thematic content in the imagery was concrete and included writing and imagery. Her approach to task was intentional and she remained engaged while art making.

## 2019-09-11 DIAGNOSIS — R12 HEARTBURN: ICD-10-CM

## 2019-09-11 DIAGNOSIS — K59.00 CONSTIPATION, UNSPECIFIED CONSTIPATION TYPE: ICD-10-CM

## 2019-09-11 PROCEDURE — 65220000003 HC RM SEMIPRIVATE PSYCH

## 2019-09-11 PROCEDURE — 74011250637 HC RX REV CODE- 250/637: Performed by: PSYCHIATRY & NEUROLOGY

## 2019-09-11 RX ORDER — HALOPERIDOL 10 MG/1
10 TABLET ORAL 2 TIMES DAILY
Status: DISCONTINUED | OUTPATIENT
Start: 2019-09-11 | End: 2019-09-16 | Stop reason: HOSPADM

## 2019-09-11 RX ORDER — OMEGA-3 FATTY ACIDS/FISH OIL 300-1000MG
1 CAPSULE ORAL DAILY
Qty: 30 CAP | Refills: 1 | Status: SHIPPED | OUTPATIENT
Start: 2019-09-11 | End: 2019-10-23 | Stop reason: SDUPTHER

## 2019-09-11 RX ORDER — POLYETHYLENE GLYCOL 3350 17 G/17G
POWDER, FOR SOLUTION ORAL
Qty: 510 G | Refills: 1 | Status: SHIPPED | OUTPATIENT
Start: 2019-09-11 | End: 2020-10-27

## 2019-09-11 RX ORDER — DOCUSATE SODIUM 100 MG/1
100 CAPSULE, LIQUID FILLED ORAL 2 TIMES DAILY
Qty: 60 CAP | Refills: 2 | OUTPATIENT
Start: 2019-09-11 | End: 2019-09-16

## 2019-09-11 RX ORDER — PANTOPRAZOLE SODIUM 40 MG/1
40 TABLET, DELAYED RELEASE ORAL DAILY
Qty: 30 TAB | Refills: 1 | OUTPATIENT
Start: 2019-09-11 | End: 2019-09-16

## 2019-09-11 RX ADMIN — DIVALPROEX SODIUM 750 MG: 250 TABLET, DELAYED RELEASE ORAL at 20:26

## 2019-09-11 RX ADMIN — OMEGA-3-ACID ETHYL ESTERS 1 CAPSULE: 1 CAPSULE, LIQUID FILLED ORAL at 08:42

## 2019-09-11 RX ADMIN — PANTOPRAZOLE SODIUM 40 MG: 40 TABLET, DELAYED RELEASE ORAL at 06:32

## 2019-09-11 RX ADMIN — LORAZEPAM 1 MG: 1 TABLET ORAL at 13:55

## 2019-09-11 RX ADMIN — LORAZEPAM 1 MG: 1 TABLET ORAL at 20:26

## 2019-09-11 RX ADMIN — PRAVASTATIN SODIUM 20 MG: 20 TABLET ORAL at 20:26

## 2019-09-11 RX ADMIN — LORAZEPAM 1 MG: 1 TABLET ORAL at 08:00

## 2019-09-11 RX ADMIN — HALOPERIDOL 5 MG: 5 TABLET ORAL at 08:41

## 2019-09-11 RX ADMIN — DOCUSATE SODIUM 100 MG: 100 CAPSULE, LIQUID FILLED ORAL at 08:42

## 2019-09-11 RX ADMIN — DIVALPROEX SODIUM 750 MG: 250 TABLET, DELAYED RELEASE ORAL at 08:42

## 2019-09-11 RX ADMIN — HALOPERIDOL 10 MG: 10 TABLET ORAL at 22:30

## 2019-09-11 RX ADMIN — THERA TABS 1 TABLET: TAB at 08:42

## 2019-09-11 RX ADMIN — HALOPERIDOL 5 MG: 5 TABLET ORAL at 18:46

## 2019-09-11 RX ADMIN — DOCUSATE SODIUM 100 MG: 100 CAPSULE, LIQUID FILLED ORAL at 20:26

## 2019-09-11 NOTE — BSMART NOTE
OCCUPATIONAL THERAPY PROGRESS NOTE    Group Time:  9141  Attendance: The patient attended full group. .  Participation:  The patient participated fully in the activity. Attention:  The patient needed frequent redirection to activity. Interaction:  The patient frequently interacts with others. The patient shows limited awareness of others. 3 or more redirections for talking over others in childlike unawareness of them.

## 2019-09-11 NOTE — BH NOTES
Treatment team met -     Medical Director:                                ___x__present        Psychiatrist:                                        __x___present      Charge nurse:                                     __x___present           MSW:                                                __x___present      :                      _x____present      Nurse Manager:                                  __x___present      Student RNs:                                      _____present      Medical Students:                               _____present      Art Therapist:                                      _x____present   Clinical Coordinator:                           _x____present   Internal :                      _x____present        Plan of care discussed and updated as appropriate. Requires redirecting on the unit, reported to be yelling on the unit, hallucinating. Depakote increased. Tawana Anderson

## 2019-09-11 NOTE — PROGRESS NOTES
conducted an Spirituality Group for zoidu, who is a 26 y.o.,female. Patient's Primary Language is: [place]. According to the patient's EMR Mu-ism Affiliation is: Non Rastafarian.     The reason the Patient came to the hospital is:   Patient Active Problem List    Diagnosis Date Noted    Severe mixed bipolar I disorder w/psychotic features, mood-incongruent ([facility] 09/05/2019    Heartburn 10/15/2018    Severe obesity (BMI 35.0-39.9) 09/18/2018    Attention deficit hyperactivity disorder (ADHD), combined type 05/30/2017    B[facility] 05/25/2017         conducted Spirituality Group on \"Goal\" and patient was one of the   participants. The  provided the following Interventions:  Continued the relationship of care and support. Listened empathically. Offered prayer and assurance of continued prayer on patients behalf. Chart reviewed. The following outcomes were achieved:  Patient expressed gratitude for 's visit. Assessment:  There are no further spiritual or Anabaptist issues which require Spiritual Care Services interventions at this time. Plan:  Chaplains will continue to follow and will provide pastoral care on an as needed/requested basis.  recommends bedside caregivers page  on duty if patient shows signs of acute spiritual or emotional distress. Chaplain Amberly OSBORNE  [address] [place] [address]   [phone]

## 2019-09-11 NOTE — BSMART NOTE
Pt. Is a 26year old female with history of Bipolar Disorder, Border personality Disorder and Mild IDD. Pt. Was hospitalized for having auditory hallucinations and ideations to harm self. Pt.'s case was discussed in treatment team.  The doctor is making adjustment with pt.;s medications. DARWIN contacted [facility] services about the pt. . REACH will meet with pt once , she is table on medications. SW Contact:  SW met with pt to discuss dc planning. Pt. expressed to Sw , she is feeling happy today. SW had pt to reflect on treatment goals: such as safety plan, continued medication and safety. Pt. expressed she has flashbacks. Pt. admit she bangs gher head when she has the flashbacks. SW encouraged pt to  talk to stay and not bang her head she when she has flashbacks as apart of positive coping. Pt. 's mood is slowly improving. SW will continue to assist the pt with dc planning. DC PLAN: Pt. Expressed she will work on not harming self , so she can go back to the group home  or [facility].

## 2019-09-11 NOTE — BH NOTES
Patient appeared to be anxious this shift. Patient wanted visitor this shift. Patient talk to someone on the phone and got a little agitated. Went to the nurse station and started shouting at the nurse. Patient was redirected from the nurse station Patient was given medications. Patient will work on goals for discharge.

## 2019-09-11 NOTE — PROGRESS NOTES
Problem: Falls - Risk of  Goal: *Absence of Falls  Description  Patient will remain fall free during hospital stay  Document Fausto Fall Risk and appropriate interventions in the flowsheet daily,. Outcome: Progressing Towards Goal  Note:   Fall Risk Interventions:            Medication Interventions: Teach patient to arise slowly                   Problem: Suicide  Goal: *STG: Remains safe in hospital  Description  Patient will be assessed daily for safety. Outcome: Progressing Towards Goal  Goal: *STG: Seeks staff when feelings of self harm or harm towards others arise  Description  Patient assessed daily for suicidal thoughts ,contract for safety. Outcome: Progressing Towards Goal  Goal: *STG: Attends activities and groups  Description  Patient will be encouraged to attend groups. Outcome: Progressing Towards Goal   Patient is pleasant this morning. Denies feeling suicidal . Attending groups. She does need re direction from staff with behaviors.

## 2019-09-11 NOTE — BH NOTES
Patient was very excited and happy during shift. Patient ate dinner completed group and evening hygiene. Patient had a successful visit and remained on task. Patient's initial roommate grew upset with patient for no reason and she got scared and we moved her into another room via the nurses orders. Patient contracted for safety and stated she does not feel SI or HI. Patient did not fall during shift. Staff will continue to monitor patient throughout the night.

## 2019-09-11 NOTE — PROGRESS NOTES
NUTRITION    Nutrition Screen    RECOMMENDATIONS / PLAN:     - No nutrition intervention indicated at this time. Will re-screen as appropriate. NUTRITION DIAGNOSIS & INTERVENTIONS:     Nutrition Diagnosis: No nutrition diagnosis at this time. ASSESSMENT:     Presented to ED with increased suicidal ideations with plan to walk intro traffic, previously at group home. Pt with excellent meal intake and appetite since admission, tolerating diet. Denied having nutritional questions or concerns during visit. Pt overweight/obesem current diet order meeting estimated energy needs. Average intake adequate to meet patients estimated nutritional needs:   [x] Yes     [] No      [] Unable to determine at this time    Diet: DIET REGULAR    Food Allergies: NKFA  Current Appetite:   [x] Good     [] Fair     [] Poor     [] Other:  Appetite/meal intake prior to admission:   [x] Good     [] Fair     [] Poor     [] Other:   Feeding Limitations:  [] Swallowing Difficulty       [] Chewing Difficulty       [] Other   Current Meal Intake: No data found. Gastrointestinal Issues:  [] Yes    [x] No: none known   Skin Integrity:  WDL    Pertinent Medications:  Reviewed: colace, lovaza, miralax, theragran   Labs:  Reviewed     Anthropometrics:  Ht Readings from Last 1 Encounters:   09/03/19 5' 5\" (1.651 m)       Last 3 Recorded Weights in this Encounter    09/03/19 1911   Weight: 94.3 kg (208 lb)       Body mass index is 34.61 kg/m².   Obese Class I    Weight History:  +10 lbs x 1-2 months PTA  Weight Metrics 9/3/2019 7/31/2019 6/6/2019 4/22/2019 12/6/2018 10/24/2018 10/15/2018   Weight 208 lb 198 lb 6.4 oz 201 lb 200 lb 12.8 oz 213 lb 217 lb 12.8 oz 215 lb   BMI 34.61 kg/m2 33.02 kg/m2 33.45 kg/m2 33.41 kg/m2 35.45 kg/m2 36.24 kg/m2 35.78 kg/m2       Admitting Diagnosis: Bipolar disorder ([facility] [F31.9]  Past Medical History:   Diagnosis Date    ADHD (attention deficit hyperactivity disorder)     [facility]     Borderline personality disorder ([facility]     Mild mental retardation     PMS (premenstrual syndrome)     Thromboembolus ([facility] 2013    PE        Education Needs:        [x] None identified  [] Identified - Not appropriate at this time  []  Identified and addressed - refer to education log  Learning Limitations:   [x] None identified  [] Identified    Cultural, Taoism & ethnic food preferences identified:  [x] None    [] Yes      ESTIMATED NUTRITION NEEDS:     6408-5385 kcal (MSJx1.2-1.3), 75-94 gm protein (0.8-1 gm/kg), 1 mL/kcal  Based on: 94 kg       [x] Actual BW      [] IBW          MONITORING & EVALUATION:     Nutrition Goal(s):   1. Po intake of meals will meet >75% of patient estimated nutritional needs within the next 7 days. Outcome: [] Met/Ongoing    [] Progressing towards goal    [] Not progressing towards goal    [x] New/Initial goal     Monitor:  [x] Food and beverage intake   [x] Diet order   [x] Nutrition-focused physical findings   [] Weight      Previous Recommendations (for follow-up assessments only):     []   Implemented       []   Not Implemented (RD to address)   [] No Longer Appropriate   [] No Recommendation Made       Discharge Planning: No nutritional discharge needs at this time.     [x]  Participated in care planning, discharge planning, & interdisciplinary rounds as appropriate      Rosamond Bumpers, RD   Pager: [phone]

## 2019-09-11 NOTE — PROGRESS NOTES
The pt was seen individually and her case was discussed with staff in Tx Team. During session today, the pt remains rather concrete. Mood lability is still present, however improving slowly with current tx with a combination of Invega Sustena,nexty injection of 156 mg IM to be administered on/about the 22 of Sept.     24-Hr Vitals/Weight (last day)     Date/Time Temp Pulse BP MAP (Calculated) BP 1 Location BP Patient Position Resp SpO2 O2 Device O2 Flow Rate (L/min) Level of Consciousness MEWS Score Weight   09/11/19 0858 98.3 °F (36.8 °C) 98 121/85 97 -- -- 16 -- -- -- Alert 1 --   09/10/19 0820 98.5 °F (36.9 °C) 84 118/85 96 -- -- 16 -- -- -- Alert 1 --     Vitals are stable. HR is somewhat high, still with in normal parameters. Current MS exam:  1. Still labile. The pt's valproate levels are in the low 50s, thus the reason for which her Depakote DR was increased to 750 mg BID. 2. Valproate levels to be obtained on 9/14/19 at 4:00 AM. The pt's valproic acid dose was increased on the 10th., reason for which we have to wait until the 14th., to get the levels. Do it before that, will not provide accurate levels. 3. From an antipsychotic point of view, the pt was administered with an injection of Bancroft on the 22 of August. The dose will be repeated on the 22 of Sept. In addition, the pt Nahid 35 RESPONDS WITH CONTROL LOSS, has been prescribed with haloperidol, a first generation antipsychotic which she indicated helps her out. Current dose of 5 mg AM and 10 mg at HS, was increased on Sept 9th. , 2 days ago. So a new dose increase to 10 mg BID will be started on the 12th. Please see med orders. 4. The pt continues to require redirection to be able to maintain self control. This is in response to the above described AH, which are commanding and ego dystonic. 5. Getting her eval by the REACH program before she is stable, will not be a good plan, since she will be rejected.  SO SHE REQUIRES BETTER STABILITY BEFORE THE EVAL OCCURS. 6. We expect the pt to be stable enough to go through the eval process on Monday or Tuesday. Acceptance and discharge to their care, usually happens 1-2 days later with bed availability. We are hopeful that will happen if stable. Will see the pt again tomorrow morning.

## 2019-09-11 NOTE — TELEPHONE ENCOUNTER
Patient also needs Colace-100mg  Requested Prescriptions     Pending Prescriptions Disp Refills    pantoprazole (PROTONIX) 40 mg tablet 30 Tab 5     Sig: Take 1 Tab by mouth daily.  omega-3/dha/epa/fish oil (FISH OIL-OMEGA-3-DHA-EPA) 300-1,000 mg capsule 30 Cap 5     Sig: Take 1 Cap by mouth daily.     polyethylene glycol (MIRALAX) 17 gram/dose powder 510 g 5     Sig: Give qam

## 2019-09-12 PROCEDURE — 74011250637 HC RX REV CODE- 250/637: Performed by: PSYCHIATRY & NEUROLOGY

## 2019-09-12 PROCEDURE — 65220000003 HC RM SEMIPRIVATE PSYCH

## 2019-09-12 RX ORDER — LOPERAMIDE HYDROCHLORIDE 2 MG/1
2 CAPSULE ORAL
Status: DISCONTINUED | OUTPATIENT
Start: 2019-09-12 | End: 2019-09-16 | Stop reason: HOSPADM

## 2019-09-12 RX ADMIN — DIVALPROEX SODIUM 750 MG: 250 TABLET, DELAYED RELEASE ORAL at 08:14

## 2019-09-12 RX ADMIN — DIVALPROEX SODIUM 750 MG: 250 TABLET, DELAYED RELEASE ORAL at 20:20

## 2019-09-12 RX ADMIN — LORAZEPAM 1 MG: 1 TABLET ORAL at 20:20

## 2019-09-12 RX ADMIN — THERA TABS 1 TABLET: TAB at 08:14

## 2019-09-12 RX ADMIN — LORAZEPAM 1 MG: 1 TABLET ORAL at 15:05

## 2019-09-12 RX ADMIN — HALOPERIDOL 10 MG: 10 TABLET ORAL at 20:20

## 2019-09-12 RX ADMIN — PRAVASTATIN SODIUM 20 MG: 20 TABLET ORAL at 20:20

## 2019-09-12 RX ADMIN — DOCUSATE SODIUM 100 MG: 100 CAPSULE, LIQUID FILLED ORAL at 08:14

## 2019-09-12 RX ADMIN — OMEGA-3-ACID ETHYL ESTERS 1 CAPSULE: 1 CAPSULE, LIQUID FILLED ORAL at 08:14

## 2019-09-12 RX ADMIN — HALOPERIDOL 10 MG: 10 TABLET ORAL at 08:14

## 2019-09-12 RX ADMIN — PANTOPRAZOLE SODIUM 40 MG: 40 TABLET, DELAYED RELEASE ORAL at 06:36

## 2019-09-12 RX ADMIN — LORAZEPAM 1 MG: 1 TABLET ORAL at 08:14

## 2019-09-12 NOTE — PROGRESS NOTES
Problem: Falls - Risk of  Goal: *Absence of Falls  Description  Patient will remain fall free during hospital stay  Document Fausto Fall Risk and appropriate interventions in the flowsheet daily,. Outcome: Progressing Towards Goal  Note:   Fall Risk Interventions:            Medication Interventions: Teach patient to arise slowly                   Problem: Suicide  Goal: *STG: Remains safe in hospital  Description  Patient will be assessed daily for safety. Outcome: Progressing Towards Goal  Goal: *STG: Seeks staff when feelings of self harm or harm towards others arise  Description  Patient assessed daily for suicidal thoughts ,contract for safety. Outcome: Progressing Towards Goal  Goal: *STG: Attends activities and groups  Description  Patient will be encouraged to attend groups. Outcome: Progressing Towards Goal  Patient is pleasant this morning. She denies suicidal thoughts. She is attending most groups with minimal assistant from staff when she is inappropriate. Sometime she need limits set for intrusive behavior. She has been using her coping skills when she becomes upset , by talking with staff and accepting the guidelines that were made for her.

## 2019-09-12 NOTE — PROGRESS NOTES
The pt was seen individually and her case was discussed with staff. During session today, she required to be redirected a couple of times, however her psychosis is doing better. Case discussed with the REACH program staff. Please see MsKunal Ko Díaz note which is self explanatory. So if improvement continues, a discharge early next week will be possible, specifically since [facility] does not have a bed available. [facility]. 24-Hr Vitals/Weight (last day)     Date/Time Temp Pulse BP MAP (Calculated) BP 1 Location BP Patient Position Resp SpO2 O2 Device O2 Flow Rate (L/min) Level of Consciousness MEWS Score Weight   09/12/19 0816 97.1 °F (36.2 °C) 102Abnormal  128/85 99 Right arm Sitting 16 -- -- -- Alert 2 --   09/12/19 0433 97 °F (36.1 °C) -- -- -- -- -- -- -- -- -- -- -- --   09/11/19 2111 97 °F (36.1 °C) 80 127/98Abnormal  108 Right arm Sitting 16 -- -- -- Alert 1 --   09/11/19 0858 98.3 °F (36.8 °C) 98 121/85 97 -- -- 16 -- -- -- Alert 1 --     Vitals are still showing a mild elevation of her HR and BP occurring on occasion. Will see again tomorrow. Depakote levels on Saturday.

## 2019-09-12 NOTE — BSMART NOTE
OCCUPATIONAL THERAPY PROGRESS NOTE    Group Time:  3916  Attendance: The patient attended full group. .  Participation:  The patient participated with moderate elaboration in the activity. Attention:  The patient needed redirection to activity at least once. Interaction:  The patient frequently interacts with others. Not as interrupting today with fewer redirections needed. Mood bright.

## 2019-09-12 NOTE — PROGRESS NOTES
GROUP THERAPY PROGRESS NOTE    Dolly Mirza is participating in Coping Skills Group. Group time: 30 minutes    Personal goal for participation:coping skills    Goal orientation: personal    Group therapy participation: active    Therapeutic interventions reviewed and discussed:     Impression of participation: discussed positive coping skills and staying healthy outside hospital setting.

## 2019-09-12 NOTE — BSMART NOTE
Pt. Is a 26year old female with history of Bipolar Disorder, Border personality Disorder and Mild IDD.  Pt. Was hospitalized for having auditory hallucinations and ideations to harm self.     Pt.'s case was discussed  With treating psychiatrist and Ur staff. SW talked to DYLON Denis with [facility]. The staff reports the  REACH house is currently full . SW informed the staff about wanting to step pt down  Next week. Ms. Angeles Todd reports if a bed is not available. Pt. Can return to her group home with REACH supports. in treatment team.       DARWIN Contact:  DARWIN met with pt to discuss dc planning. Pt.informed SW she is happy. Pt. states she was able to talk to her staff at the [facility]. SW had pt to reflect on treatment goals: such as safety plan, continued medication and safety. Pt. Was provided verbal praise for not displaying negative self injurious behaviors. Pt. 's mood is slowly improving.   Pt.  Will adhere to redirection and requires verbal prompts for  Being intrusive at times.  DARWIN will continue to assist the pt with dc planning.      DC PLAN: Pt. Expressed she will work on not harming self , so she can go back to the group home  or REACH.

## 2019-09-13 PROCEDURE — 74011250637 HC RX REV CODE- 250/637: Performed by: PSYCHIATRY & NEUROLOGY

## 2019-09-13 PROCEDURE — 65220000003 HC RM SEMIPRIVATE PSYCH

## 2019-09-13 RX ADMIN — DIVALPROEX SODIUM 750 MG: 250 TABLET, DELAYED RELEASE ORAL at 20:03

## 2019-09-13 RX ADMIN — LORAZEPAM 1 MG: 1 TABLET ORAL at 13:49

## 2019-09-13 RX ADMIN — LORAZEPAM 1 MG: 1 TABLET ORAL at 08:46

## 2019-09-13 RX ADMIN — THERA TABS 1 TABLET: TAB at 08:45

## 2019-09-13 RX ADMIN — PANTOPRAZOLE SODIUM 40 MG: 40 TABLET, DELAYED RELEASE ORAL at 06:36

## 2019-09-13 RX ADMIN — PRAVASTATIN SODIUM 20 MG: 20 TABLET ORAL at 20:03

## 2019-09-13 RX ADMIN — HALOPERIDOL 10 MG: 10 TABLET ORAL at 08:45

## 2019-09-13 RX ADMIN — OMEGA-3-ACID ETHYL ESTERS 1 CAPSULE: 1 CAPSULE, LIQUID FILLED ORAL at 08:46

## 2019-09-13 RX ADMIN — DIVALPROEX SODIUM 750 MG: 250 TABLET, DELAYED RELEASE ORAL at 08:45

## 2019-09-13 RX ADMIN — HALOPERIDOL 10 MG: 10 TABLET ORAL at 20:03

## 2019-09-13 RX ADMIN — TRAZODONE HYDROCHLORIDE 50 MG: 50 TABLET ORAL at 20:03

## 2019-09-13 RX ADMIN — LORAZEPAM 1 MG: 1 TABLET ORAL at 20:03

## 2019-09-13 NOTE — PROGRESS NOTES
The pt was seen individually and her case was discussed with staff. During session today, she was advised about her upcoming discharge back to the [facility] where she was residing before. The REACH program does not have a bed available for the pt, and there will not be one for longer than 2 weeks. Response to this info was appropriate, however. BRANDY will be able to help with OP support, this while she resides at the [facility]. 24-Hr Vitals/Weight (last day)     Date/Time Temp Pulse BP MAP (Calculated) BP 1 Location BP Patient Position Resp SpO2 O2 Device O2 Flow Rate (L/min) Level of Consciousness MEWS Score Weight   09/12/19 2026 97.9 °F (36.6 °C) 100 113/73 86 Right arm Sitting 16 -- -- -- Alert 1 --   09/12/19 0816 97.1 °F (36.2 °C) 102Abnormal  128/85 99 Right arm Sitting 16 -- -- -- Alert 2 --   09/12/19 0433 97 °F (36.1 °C) -- -- -- -- -- -- -- -- -- -- -- --     Vitals showed a mild increased HR. This could be related to anxiety, in addition to the possibility of being meds related. No change on her current meds profile is expected since otherwise, the pt is showing improvement of her psychiatric symptoms, in addition to her not showing any other evidence of side effects. The pt was advised about the undersigned being off this weekend, and that our coverage will see her for me. A discharge on Monday is expected. Valproate levels tomorrow morning.

## 2019-09-13 NOTE — BSMART NOTE
OCCUPATIONAL THERAPY PROGRESS NOTE    Group Time:  0460  Attendance: The patient attended 3/4 of group. The patient left and returned to activity at least once. Participation:  The patient participated with moderate elaboration in the activity. Attention:  The patient needed frequent redirection to activity. Interaction:  The patient frequently interacts with others. Restless, attention span short especially when others talking.   Behavior controlled

## 2019-09-13 NOTE — GROUP NOTE
ARLEEN  GROUP DOCUMENTATION INDIVIDUAL                                                                          Group Therapy Note    Date: September 13    Group Start Time: 4715  Group End Time: 9948  Group Topic: Nursing    [facility]    Group Therapy Note    Attendees: 5         Attendance: Did not attend        Camryn Cobian

## 2019-09-13 NOTE — BSMART NOTE
ART THERAPY GROUP PROGRESS NOTE    PATIENT SCHEDULED FOR GROUP AT: 1000    ATTENDANCE: 1/2    PARTICIPATION LEVEL: Participates fully in the art process. ATTENTION LEVEL: Able to focus on task. FOCUS: Cognitive    SYMBOLIC & THEMATIC CONTENT AS NOTED IN IMAGERY: She presented with a calm mood and flat affect and her thought processes were goal directed and concrete. She arrived to group late due to being in the shower. She worked quickly when engaging the art process and thematic content was focused on behaving, her friends, her sister, and God. She was actively engaged in group discussion and asked questions about other group members' artwork.

## 2019-09-13 NOTE — GROUP NOTE
IP  GROUP DOCUMENTATION INDIVIDUAL                                                                          Group Therapy Note    Date: September 13    Group Start Time: 1330  Group End Time: 1400  Group Topic: Nursing    [facility] 1 SPECIAL TRTMT 1    Leta Kay, RN    IP [facility] GROUP DOCUMENTATION GROUP    Group Therapy Note    Attendees:          Attendance: Did not attend      I  Additional Notes: patient was sleeping     Janice Yeboah

## 2019-09-13 NOTE — BH NOTES
During this shift (3pm-11pm) pt's mood has been elevated on and of due to pt's difficulty accepting her reality of having to stay at the hospital longer than expected. Pt constantly asks staff, \"When am I leaving? The doctor said 8 days. \" \"Can you call my ? I need to get out of here. \" \"What's my Depakote level? \" Pt hysterical d/t her being unable to control when she can leave the hospital. Pt michelle letting her emotions out by crying in the milieu, when it became disruptive to other pts, pt was redirected to room where she contracted for safety. Pt attended groups held by staff during this shift and has been compliant with medication and staff direction. Pt did not have any visitors this shift. Will continue monitoring pt for behavior, location & safety.

## 2019-09-13 NOTE — BSMART NOTE
Pt. Is a 26year old female with history of Bipolar Disorder, Border personality Disorder and Mild IDD.  Pt. Was hospitalized for having auditory hallucinations and ideations to harm self.     Pt.'s case was discussed  With treating psychiatrist.   Dore Lesch met with pt.'s staff from [facility]  [facility]. SW informed the staff about REACH services and recommendation of [employer] when pt is dc Monday.       SW Contact:  SW met with pt to discuss dc planning. Pt. Reports she is feeling better. Pt states she has talked to her staff at the [facility]. SW had pt to reflect on treatment goals. Pt. Denies ideations and hallucinations. SW provided pt with positive verbal praise for demonstrating non self injurious behaviors. Pt. Appears to be at baseline. Pt requires less redirecting for self injurious behaviors. Pt does need constant redirection for being intrusive with social boundaries. Pt. Short listen to redirection.  SW will continue to assist the pt with dc planning.      DC PLAN: Pt. Expressed she will work on not harming self , so she can go back to the group [facility]  or REACH.

## 2019-09-14 LAB — VALPROATE SERPL-MCNC: 54 UG/ML (ref 50–100)

## 2019-09-14 PROCEDURE — 74011250637 HC RX REV CODE- 250/637: Performed by: PSYCHIATRY & NEUROLOGY

## 2019-09-14 PROCEDURE — 36415 COLL VENOUS BLD VENIPUNCTURE: CPT

## 2019-09-14 PROCEDURE — 65220000003 HC RM SEMIPRIVATE PSYCH

## 2019-09-14 PROCEDURE — 80164 ASSAY DIPROPYLACETIC ACD TOT: CPT

## 2019-09-14 RX ADMIN — LORAZEPAM 1 MG: 1 TABLET ORAL at 20:13

## 2019-09-14 RX ADMIN — LORAZEPAM 1 MG: 1 TABLET ORAL at 13:32

## 2019-09-14 RX ADMIN — OMEGA-3-ACID ETHYL ESTERS 1 CAPSULE: 1 CAPSULE, LIQUID FILLED ORAL at 09:00

## 2019-09-14 RX ADMIN — PRAVASTATIN SODIUM 20 MG: 20 TABLET ORAL at 20:13

## 2019-09-14 RX ADMIN — DIVALPROEX SODIUM 750 MG: 250 TABLET, DELAYED RELEASE ORAL at 09:00

## 2019-09-14 RX ADMIN — HALOPERIDOL 10 MG: 10 TABLET ORAL at 09:00

## 2019-09-14 RX ADMIN — THERA TABS 1 TABLET: TAB at 09:00

## 2019-09-14 RX ADMIN — DIVALPROEX SODIUM 750 MG: 250 TABLET, DELAYED RELEASE ORAL at 20:12

## 2019-09-14 RX ADMIN — HALOPERIDOL 10 MG: 10 TABLET ORAL at 20:13

## 2019-09-14 RX ADMIN — LORAZEPAM 1 MG: 1 TABLET ORAL at 09:00

## 2019-09-14 NOTE — BH NOTES
Patient was anxious this shift because her visitors did not come. Patient called and they told her that they were coming to visit but no one showed. Patient was pacing the unit until visiting hours were over. Patient kept announcing \"I 'am going home Monday\". Patient will work on goals for discharge.

## 2019-09-14 NOTE — PROGRESS NOTES
Problem: Suicide  Goal: *STG: Remains safe in hospital  Description  Patient will be assessed daily for safety. Outcome: Progressing Towards Goal  Goal: *STG: Seeks staff when feelings of self harm or harm towards others arise  Description  Patient assessed daily for suicidal thoughts ,contract for safety. Outcome: Progressing Towards Goal  Goal: *STG: Attends activities and groups  Description  Patient will be encouraged to attend groups. Outcome: Progressing Towards Goal  Goal: *STG/LTG: Complies with medication therapy  Description  Patient will take prescribed medication daily. Outcome: Progressing Towards Goal  Patient is pleasant this morning, she attending group, able to focus for a little while. Re direction needed at times. No acting out behaviors.

## 2019-09-14 NOTE — PROGRESS NOTES
Problem: Falls - Risk of  Goal: *Absence of Falls  Description  Patient will remain fall free during hospital stay  Document Fausto Fall Risk and appropriate interventions in the flowsheet daily,. Outcome: Progressing Towards Goal  Note:   Fall Risk Interventions:            Medication Interventions: Teach patient to arise slowly                   Problem: Suicide  Goal: *STG: Remains safe in hospital  Description  Patient will be assessed daily for safety. Outcome: Progressing Towards Goal  Goal: *STG: Attends activities and groups  Description  Patient will be encouraged to attend groups. Outcome: Progressing Towards Goal     Patient is pleasant today. She denies suicidal thoughts. She is attending most groups with minimal assistant from staff when she is inappropriate. Sometime she need limits set for intrusive behavior. She has been using her coping skills when she becomes upset , by talking with staff and accepting the guidelines that were made for her.

## 2019-09-14 NOTE — BSMART NOTE
ART THERAPY GROUP PROGRESS NOTE    PATIENT SCHEDULED FOR GROUP AT: 900    ATTENDANCE: 1/2    PARTICIPATION LEVEL: Participates fully in the art process. ATTENTION LEVEL: Able to focus on task. FOCUS: Self Affirmation    SYMBOLIC & THEMATIC CONTENT AS NOTED IN IMAGERY: She presented with a calm mood and flat affect, her thought processes were goal oriented throughout the group. She was actively engaged in the art making process, she displayed difficulty in fully comprehending the directive however she maintained engaged and asked for help from art therapist. She worked quickly and left group after completing her artwork.

## 2019-09-15 PROCEDURE — 65220000003 HC RM SEMIPRIVATE PSYCH

## 2019-09-15 PROCEDURE — 74011250637 HC RX REV CODE- 250/637: Performed by: PSYCHIATRY & NEUROLOGY

## 2019-09-15 RX ADMIN — LORAZEPAM 1 MG: 1 TABLET ORAL at 20:06

## 2019-09-15 RX ADMIN — OMEGA-3-ACID ETHYL ESTERS 1 CAPSULE: 1 CAPSULE, LIQUID FILLED ORAL at 08:12

## 2019-09-15 RX ADMIN — DIVALPROEX SODIUM 750 MG: 250 TABLET, DELAYED RELEASE ORAL at 20:06

## 2019-09-15 RX ADMIN — PRAVASTATIN SODIUM 20 MG: 20 TABLET ORAL at 20:06

## 2019-09-15 RX ADMIN — HALOPERIDOL 10 MG: 10 TABLET ORAL at 20:06

## 2019-09-15 RX ADMIN — HALOPERIDOL 10 MG: 10 TABLET ORAL at 08:12

## 2019-09-15 RX ADMIN — PANTOPRAZOLE SODIUM 40 MG: 40 TABLET, DELAYED RELEASE ORAL at 06:55

## 2019-09-15 RX ADMIN — LORAZEPAM 1 MG: 1 TABLET ORAL at 08:00

## 2019-09-15 RX ADMIN — THERA TABS 1 TABLET: TAB at 08:12

## 2019-09-15 RX ADMIN — LORAZEPAM 1 MG: 1 TABLET ORAL at 13:31

## 2019-09-15 RX ADMIN — DIVALPROEX SODIUM 750 MG: 250 TABLET, DELAYED RELEASE ORAL at 08:11

## 2019-09-15 NOTE — PROGRESS NOTES
The patient's chart was reviewed, she was discussed with nursing staff and she was seen during rounds. Per nursing staff, the patient is focused on discharge. During rounds, the patient states she is \"ready for discharge. \"  She reports eating and sleeping without difficulty. She denies psychosis. Vitals-97   115/82   100   16    MSE-22yo WF. Appears younger than stated age. Somewhat intrusive. Cooperative. Talkative. Stated mood is, \"Good. \"  Affect is full. Thoughts are logical.  Denies psychosis. Denies SI and HI. Insight appears limited and judgment is fair. A/P-Bipolar I disorder, most recent episode mixed with psychotic symptoms; Mild intellectual disability; ADHD, combined type, by history  1. Continue Depakote 750mg bid. VPA level obtained today-54. 2. Continue Haldol 10mg bid. 3. Continue Ativan 1mg tid. 4. Continue medications for physical health. 5. Continue hospitalization.

## 2019-09-15 NOTE — GROUP NOTE
ARLEEN  GROUP DOCUMENTATION INDIVIDUAL                                                                          Group Therapy Note    Date: September 15    Group Start Time: 8362  Group End Time: 8525  Group Topic: Nursing    [facility] SPECIAL TRTMT Yoli Loya 124, RN     [facility]    Group Therapy Note    Attendees: 3           Attendance: Did not attend            Gerard Siddiqi RN

## 2019-09-15 NOTE — PROGRESS NOTES
Problem: Suicide  Goal: *STG: Remains safe in hospital  Description  Patient will be assessed daily for safety. Outcome: Progressing Towards Goal  Goal: *STG: Seeks staff when feelings of self harm or harm towards others arise  Description  Patient assessed daily for suicidal thoughts ,contract for safety. Outcome: Progressing Towards Goal  Goal: *STG: Attends activities and groups  Description  Patient will be encouraged to attend groups. Outcome: Progressing Towards Goal     Patient has been encouraged up for breakfast with medication compliance. Patient reported she slept \"good\"throughout the night. Patient stated she is feeling \"good, I think I'm leaving Monday though\". Patient has appropriate affect and mood and has been social and interactive within milieu. Patient sated \"I am doing good\". Patient denies A/VH, denies SI/HI, with no safety issues noted. Will monitor for safety with support as needed throughout treatment regimen.

## 2019-09-15 NOTE — PROGRESS NOTES
The patient's chart was reviewed, she was discussed with nursing staff and she was seen during rounds. Per nursing staff, the patient continues to be focused on discharge. During rounds, the patient reports eating and sleeping well. She inquired about discharge and was encouraged to discuss this with primary psychiatrist.  She is tolerating her medications without difficulty. MSE-24yo WF. Appears younger than stated age. Seen at her bedside. Initially lying in bed. Cooperative. Stated mood is again, \"Good. \"  Affect is full. Thoughts are focused on discharge. Denies psychosis. Denies SI and HI. Insight is limited and judgment is fair. A/P-Bipolar I disorder, most recent episode mixed with psychotic symptoms; Mild intellectual disability; ADHD, combined type, by history  1. Continue Depakote 750mg bid. VPA level obtained 9/14/19-54. 2. Continue Haldol 10mg bid. 3. Continue Ativan 1mg tid. 4. Continue medications for physical health. 5. Continue hospitalization.

## 2019-09-15 NOTE — BH NOTES
Patient has been visible within milieu and has been calm and cooperative upon approach. Patient has been focused on discharging tomorrow with no verbal redirection required. Patient has been open to 1:1 and unit activities. Patient has appropriate affect and mood. Patient has been compliant with prescribed medications. Patient denies SI/HI, with no safety issues noted. Patient denies A/VH. Will monitor for safety with support as needed throughout treatment regimen.

## 2019-09-15 NOTE — BSMART NOTE
ART THERAPY GROUP PROGRESS NOTE    PATIENT SCHEDULED FOR GROUP AT: 900    ATTENDANCE: 1/2    PARTICIPATION LEVEL: Participates fully in the art process. ATTENTION LEVEL: Needs occasional re-direction. FOCUS: Gratitude    SYMBOLIC & THEMATIC CONTENT AS NOTED IN IMAGERY: She presented with a calm mood and blunted affect and her thought processes were concrete throughout the group. She was actively engaged in the art therapy directive and her approach to task was intentional. She struggled with interrupting others during group discussion and required redirection to listen to others when talking.

## 2019-09-15 NOTE — BH NOTES
During this shift (3pm-11pm) pt has been in high spirits after receiving good news from her doctor. Pt is excited for D/C on Monday. Pt has been in the milieu for the majority of the evening socializing with peers and participating in groups held by staff. Pt did not require redirection from staff at any time this shift. Pt did not have any visitors but has been calling friends and family spreading the good news. Pt is compliant with medications and requests from staff. Pt does not endorse SI, HI or AVH at this time. Will continue rounds monitoring pt for behavior, location  & safety.

## 2019-09-16 VITALS
RESPIRATION RATE: 16 BRPM | SYSTOLIC BLOOD PRESSURE: 118 MMHG | TEMPERATURE: 98.1 F | WEIGHT: 208 LBS | HEART RATE: 98 BPM | HEIGHT: 65 IN | DIASTOLIC BLOOD PRESSURE: 84 MMHG | BODY MASS INDEX: 34.66 KG/M2 | OXYGEN SATURATION: 100 %

## 2019-09-16 PROCEDURE — 74011250637 HC RX REV CODE- 250/637: Performed by: PSYCHIATRY & NEUROLOGY

## 2019-09-16 RX ORDER — PANTOPRAZOLE SODIUM 40 MG/1
40 TABLET, DELAYED RELEASE ORAL
Qty: 15 TAB | Refills: 1 | Status: SHIPPED | OUTPATIENT
Start: 2019-09-17 | End: 2019-09-18 | Stop reason: SDUPTHER

## 2019-09-16 RX ORDER — THERA TABS 400 MCG
1 TAB ORAL DAILY
Qty: 1 TAB | Refills: 0 | Status: SHIPPED
Start: 2019-09-17 | End: 2019-09-18 | Stop reason: SDUPTHER

## 2019-09-16 RX ORDER — DIVALPROEX SODIUM 500 MG/1
1000 TABLET, DELAYED RELEASE ORAL 2 TIMES DAILY
Qty: 60 TAB | Refills: 1 | Status: SHIPPED | OUTPATIENT
Start: 2019-09-16 | End: 2022-07-13

## 2019-09-16 RX ORDER — DIVALPROEX SODIUM 250 MG/1
1000 TABLET, DELAYED RELEASE ORAL 2 TIMES DAILY
Status: DISCONTINUED | OUTPATIENT
Start: 2019-09-16 | End: 2019-09-16 | Stop reason: HOSPADM

## 2019-09-16 RX ORDER — IBUPROFEN 400 MG/1
400 TABLET ORAL
Qty: 1 TAB | Refills: 0 | Status: SHIPPED
Start: 2019-09-16 | End: 2019-09-18 | Stop reason: SDUPTHER

## 2019-09-16 RX ORDER — HALOPERIDOL 10 MG/1
10 TABLET ORAL 2 TIMES DAILY
Qty: 30 TAB | Refills: 1 | Status: SHIPPED | OUTPATIENT
Start: 2019-09-16

## 2019-09-16 RX ORDER — LORAZEPAM 1 MG/1
1 TABLET ORAL 3 TIMES DAILY
Qty: 45 TAB | Refills: 1 | Status: SHIPPED | OUTPATIENT
Start: 2019-09-16

## 2019-09-16 RX ADMIN — LORAZEPAM 1 MG: 1 TABLET ORAL at 13:26

## 2019-09-16 RX ADMIN — PANTOPRAZOLE SODIUM 40 MG: 40 TABLET, DELAYED RELEASE ORAL at 07:09

## 2019-09-16 RX ADMIN — HALOPERIDOL 10 MG: 10 TABLET ORAL at 08:08

## 2019-09-16 RX ADMIN — DIVALPROEX SODIUM 750 MG: 250 TABLET, DELAYED RELEASE ORAL at 08:08

## 2019-09-16 RX ADMIN — LORAZEPAM 1 MG: 1 TABLET ORAL at 08:08

## 2019-09-16 RX ADMIN — OMEGA-3-ACID ETHYL ESTERS 1 CAPSULE: 1 CAPSULE, LIQUID FILLED ORAL at 08:08

## 2019-09-16 RX ADMIN — THERA TABS 1 TABLET: TAB at 08:08

## 2019-09-16 NOTE — PROGRESS NOTES
Problem: Falls - Risk of  Goal: *Absence of Falls  Description  Patient will remain fall free during hospital stay  Document Fausto Fall Risk and appropriate interventions in the flowsheet daily,. Outcome: Progressing Towards Goal  Note:   Fall Risk Interventions:            Medication Interventions: Teach patient to arise slowly                   Problem: Suicide  Goal: *STG: Remains safe in hospital  Description  Patient will be assessed daily for safety. Outcome: Progressing Towards Goal  Goal: *STG: Seeks staff when feelings of self harm or harm towards others arise  Description  Patient assessed daily for suicidal thoughts ,contract for safety. Outcome: Progressing Towards Goal  Goal: *STG: Attends activities and groups  Description  Patient will be encouraged to attend groups. Outcome: Progressing Towards Goal  Goal: *STG/LTG: Complies with medication therapy  Description  Patient will take prescribed medication daily. Outcome: Progressing Towards Goal  Goal: Interventions  Outcome: Progressing Towards Goal     Problem: Patient Education: Go to Patient Education Activity  Goal: Patient/Family Education  Outcome: Progressing Towards Goal   Pt is pleasant and cooperative. She denies any feelings of harming self or others. She is compliant with medication and participating in groups. Pt is focused on being discharged today.

## 2019-09-16 NOTE — BH NOTES
Pt caregiver here to pick her up. Discharge instructions given to pt and caregiver, they was given a copy. Discharge at [phone] with her belongings with her caregiver.

## 2019-09-16 NOTE — BSMART NOTE
Pt. Is a 26year old female with history of Bipolar Disorder, Border personality Disorder and Mild IDD.  Pt. Was hospitalized for having auditory hallucinations and ideations to harm self.     Pt.'s case was discussed  With treating psychiatrist and  staff. Pt. Will be dc today. Pt. Has the following aftercare appointments: Pt. Has a therapy appointment with Radha Valles on 9/24/19 @ 10:00 a.m  @ [address] [facility], [address] [place] [address] Road  [phone]. Pt. Has medication management appointment with  Dr. Emily Hopkins on 11/5/19 @ [facility]  [address] [place] [address], [address]    Phone: [phone]    Pt. Has  a PCP appointment with Bobby Thomas @ 9:20 am on 9/18/2019. Please arrive at 9:00 am for your appt. Pt. Will also have [place] at the group home . Ms. Silvia Burden @ [phone] will meet pt on 9/18/19 @ 11:00 a.m at the group home    [facility] staff will not pick pt up until 3:00 today     Pt. Expressed she was happy to be going home today. SW encouraged medication treatment compliance. SW encouraged pt to utilize positive coping skills to express her emotions. Pt reflected on positive coping skills coping skills ( deep breathing, talk to staff and take a walk oppose to running away and or hurting self and others. Pt. denies ideations and hallucinations. DARWIN contacted both  Mrs. Zaragoza Renaldo and Prakash Douglas both staff members from [facility]. The provider will come and pick pt up @ 3:00 .

## 2019-09-16 NOTE — DISCHARGE INSTRUCTIONS
BEHAVIORAL HEALTH NURSING DISCHARGE NOTE      The following personal items collected during your admission are returned to you:   Dental Appliance:    Vision: Visual Aid: None  Hearing Aid:    Jewelry:    Clothing:    Other Valuables:    Valuables sent to safe:        PATIENT INSTRUCTIONS:      Regular diet      The discharge information has been reviewed with the patient and caregiver. The patient and caregiver verbalized understanding.       Patient armband removed and shredded

## 2019-09-18 ENCOUNTER — OFFICE VISIT (OUTPATIENT)
Dept: FAMILY MEDICINE CLINIC | Age: 24
End: 2019-09-18

## 2019-09-18 VITALS
RESPIRATION RATE: 16 BRPM | TEMPERATURE: 98.1 F | HEIGHT: 65 IN | DIASTOLIC BLOOD PRESSURE: 82 MMHG | WEIGHT: 204.4 LBS | SYSTOLIC BLOOD PRESSURE: 126 MMHG | HEART RATE: 109 BPM | OXYGEN SATURATION: 98 % | BODY MASS INDEX: 34.05 KG/M2

## 2019-09-18 DIAGNOSIS — F31.9 BIPOLAR 1 DISORDER (HCC): Primary | ICD-10-CM

## 2019-09-18 DIAGNOSIS — K21.9 GASTROESOPHAGEAL REFLUX DISEASE WITHOUT ESOPHAGITIS: ICD-10-CM

## 2019-09-18 DIAGNOSIS — F31.64 SEVERE MIXED BIPOLAR I DISORDER W/PSYCHOTIC FEATURES, MOOD-INCONGRUENT (HCC): ICD-10-CM

## 2019-09-18 RX ORDER — IBUPROFEN 400 MG/1
400 TABLET ORAL
Qty: 60 TAB | Refills: 1 | Status: SHIPPED | OUTPATIENT
Start: 2019-09-18 | End: 2020-02-19

## 2019-09-18 RX ORDER — PANTOPRAZOLE SODIUM 40 MG/1
40 TABLET, DELAYED RELEASE ORAL
Qty: 30 TAB | Refills: 5 | Status: SHIPPED | OUTPATIENT
Start: 2019-09-18 | End: 2019-09-18 | Stop reason: SDUPTHER

## 2019-09-18 RX ORDER — THERA TABS 400 MCG
1 TAB ORAL DAILY
Qty: 30 TAB | Refills: 5 | Status: SHIPPED | OUTPATIENT
Start: 2019-09-18

## 2019-09-18 RX ORDER — IBUPROFEN 400 MG/1
400 TABLET ORAL
Qty: 60 TAB | Refills: 1 | Status: SHIPPED | OUTPATIENT
Start: 2019-09-18 | End: 2019-09-18 | Stop reason: SDUPTHER

## 2019-09-18 RX ORDER — THERA TABS 400 MCG
1 TAB ORAL DAILY
Qty: 30 TAB | Refills: 5 | Status: SHIPPED | OUTPATIENT
Start: 2019-09-18 | End: 2019-09-18 | Stop reason: SDUPTHER

## 2019-09-18 RX ORDER — PANTOPRAZOLE SODIUM 40 MG/1
40 TABLET, DELAYED RELEASE ORAL
Qty: 30 TAB | Refills: 5 | Status: SHIPPED | OUTPATIENT
Start: 2019-09-18 | End: 2020-02-19

## 2019-09-18 NOTE — PROGRESS NOTES
Pt is here for follow up from hospital for bipolarw/psyhotic features    1. Have you been to the ER, urgent care clinic since your last visit? Hospitalized since your last visit? Yes 9/3/19 [facility]    2. Have you seen or consulted any other health care providers outside of the [address] [place] [address] since your last visit? Include any pap smears or colon screening.  No

## 2019-09-25 DIAGNOSIS — E78.2 MIXED HYPERLIPIDEMIA: ICD-10-CM

## 2019-09-25 RX ORDER — PRAVASTATIN SODIUM 20 MG/1
20 TABLET ORAL
Qty: 30 TAB | Refills: 5 | Status: SHIPPED | OUTPATIENT
Start: 2019-09-25 | End: 2020-03-06

## 2019-09-25 NOTE — TELEPHONE ENCOUNTER
Jimbo Salazar with [facility] requesting copies on current prescriptions & refills as needed. Only 1 new RX needed. Pravachol.

## 2019-09-28 NOTE — DISCHARGE SUMMARY
[facility]    Name:  Laura Martin  MR#:   [id number]  :  1995  ACCOUNT #:  [de-identified]  ADMIT DATE:  2019  DISCHARGE DATE:  2019    SIGNIFICANT FINDINGS:  History and physical exam was performed shortly after the patient was admitted to the facility. Attention invited to this document, a place where the reasons for which the patient was brought to the attention of DR. SPENCE'S [facility] [place] [facility]\"" Emergency Department by her caregiver with adult home where the patient resides are the same as the findings upon her being examined there in addition to the reasons for which the patient required to be admitted to the facility psychiatrically are very clearly stated. For the purpose of this discharge summary, the reader is advised that the patient was brought to the attention of DR. ANTONIA FRANCO as stated by her caregiver with the adult home where she resides. The patient with a chronic history of bipolar illness and who also has been diagnosed in the past as suffering with a borderline personality disorder (?). She presented herself with increased suicidal ideation with plans to walk into traffic. As a resident of a group home, a staff member was present at the time of the patient's evaluation at the ER with the patient indicating that in addition to her treatment with [place] which she receives an injection of 156 mg intramuscularly every four weeks, she was needing \"prescriptions for Haldol injections. \"  The patient had a history of responding well to haloperidol in the past when provided in injectable form; however, for the treatment of a bipolar illness, it appears that oxcarbazepine (Trileptal) has been the mood disorder drug the patient has been chosen to be prescribed with since on or about  when she was here in our facility under the care of Dr. Sujata Cary.   During the initial session, the patient described a history of mood lability with a prior history of suicidal attempts, this being the reason for which the patient's hospitalization was obviously indicated. The patient has a prior history of hospitalizations in our facility as above indicated, admitted to the care of Dr. Jose Avila on or about 2017 when she required to be admitted on 05/24 and discharged a week later on 05/31 with a final diagnosis of bipolar I disorder. During that admission, the patient was also diagnosed with a mild intellectual disability and attention deficit disorder combined type. She has a chronic history of medical problems including a prior history of what appears to be glucose intolerance versus diabetes mellitus, the same as a history of recurrent urinary tract infections. During her hospitalization here, Dr. Jose Avila prescribed her with ziprasidone 80 mg twice a day and oxcarbazepine which was increased to 600 mg twice a day for additional mood stabilization. She was also maintained on her dose of Vyvanse and Intuniv for treatment of her attention deficit, the same as her completing a course of antibiotic treatment with Keflex due to her having the symptoms and labs compatible with cystitis. By the time in which the patient was discharged, she was taking ziprasidone 80 mg twice a day, Vyvanse 20 mg daily, Pravachol 40 mg every night at bedtime due to her history of dyslipidemia, the same as the prescription for guaifenesin due to problems with coughing and oxcarbazepine 600 mg twice a day for mood stabilization. Other medications have been prescribed as an outpatient with the patient being seen by Dr. Max Copeland as an outpatient; however, it is not clear if she continued with being provided therapy with [address] [place] [address] with Ms. Roseline Guerrier being the therapist to which she was discharged with outpatient appointments on 07/19/2017.     Multiple labs were performed at the time of the patient's evaluation with emergency room, all of them again clearly described on her admission note. We will not repeat them here with a great deal of detail; however, the tests including a CBC with differential showed normal results, the CMP showing normal lytes, normal kidney functioning tests, blood sugar of 120, estimated GFR of 116 mL per minute, liver function tests showed normal results with exception of an alkaline phosphatase of 167, normal values between 45 and 117 units per liter. Pregnancy test in urine was negative. Urine drug screen was negative also. COURSE DURING HOSPITALIZATION AND TREATMENT:  Admitted to the [facility], the patient remained in the same unit throughout her course of inpatient care. She was seen on daily individual psychiatric basis and was also referred to the groups within the context of the program.  Admitted under the auspices of a temporary detaining order, the patient was presented before the local courts, at which time, she was involuntarily committed for inpatient treatment. During this hospitalization, some changes were made on the patient's medications treatment regimen. This included her being maintained on prescriptions for [facility] which again she was receiving at a dose of 156 mg intramuscularly every four weeks which she had received some time prior to admission. However, she was started treatment with haloperidol increased to 10 mg twice a day which was very helpful to improve the patient's impairment of reality testing which was obvious at the time of the initial evaluation. In addition for mood stabilization, since she was considered to have a mixed episode of bipolar illness I disorder, she was prescribed with divalproex which she not only tolerated very well, but also improved the patient's stability.     During this admission, several other labs were performed including a lipid panel due to her prescription for [facility] which showed results with some minor alterations including triglycerides of 125, cholesterol elevated to 231, HDL low at 38, cholesterol-HDL ratio of 6.1, LDL of 169 with a normal A1c of 5.3%. So no evidence of diabetes mellitus type 2 noted during this admission. Treatment with Depakote was prescribed with the patient showing, with a dose of 750 mg twice a day, to have a blood level of only 54 mcg/mL, very low for psychiatric purposes. For that reason, her dose of Depakote was increased to 1000 mg twice a day of the DR presentation with excellent treatment response being noted. The patient's TSH had been performed also and showed normal results at 1.44 international units per milliliter. With this treatment approach and with redirection in regards to her agitated behavior going to require the same as supportive therapy individually and in group, the patient was able to reach a baseline level of functioning to the point in which by the time of discharge, she was considered to be much improved and under much better control. Throughout her hospital stay, attempts to refer her to be provided treatment with the [facility] for the way of considering the possibility of the patient being provided with admission to one of their beds to continue the treatment that we provided her in the hospital where this discharge plan was confronted with the fact that [facility] did not have beds available and so the patient at the time of discharge was required to return back to the adult home where she was prior to admission; however, members of the staff of [facility] were contacted to provide the patient support at the [facility] home where she was going to reside. She had appointments set with her PCP for 09/18 in addition to continued medication management with Dr. Chelsea Cadena, appointment on 11/05/2019 at [address] [place] [address] in [place].   The patient was also provided with therapy outpatient appointments on 09/24 at 10:00 a.m. with the B to continue to provide her with therapy as she required. CONDITION UPON DISCHARGE:  Considered to be much improved. The patient was not suicidal nor she showed any evidence of danger to others by this time of discharge. She denied any psychotic symptoms and there was no evidence of neurocognitive impairment, even though the patient did show evidence of a mild intellectual disability as I had above mentioned. FINAL DIAGNOSES:  AXIS I:  Bipolar I disorder, most recent episode mixed with psychotic symptoms, severe. AXIS II:  Mild intellectual disability. AXIS III:  Moderately obese. Dyslipidemia by history, under treatment. Reflux esophagitis. Prior history of remote pulmonary embolism as far as 2013, history of adverse reactions versus allergies to birth control pills with the type of reaction unknown, severity not specified. DISPOSITION:  Outpatient treatment referrals as I have above mentioned. Please see her IP  discharge note which is self explanatory. PRESCRIPTIONS UPON DISCHARGE:  The patient was discharged on the following medications including Depakote  mg tablets, #60, one refill to take two tablets twice a day; haloperidol 10 mg tablets, #30, one refill to take one twice a day; lorazepam 1 mg tablets, #45, one refill, to take one three times a day; the patient was also receiving injections of Invega Sustenna 156 mg every four weeks as I have already indicated; pantoprazole 40 mg tablets, #15, one refill to take one every morning. She also was continued on prescriptions for pravastatin 20 mg every night at bedtime, MiraLax 17 g with 8 ounces of water one daily due to chronic constipation, omega 3 (fish oil) 1000 mg daily, therapeutic multivitamins one daily, ibuprofen 400 mg every four hours as needed for pain. She also was continued on her birth control pill, taken for birth control, with etonogestrel 68 mg implant as prior admission.     No evidence of medications-related side effects noted upon discharge. PROGNOSIS:  Due to the patient's chronic history of psychiatric difficulties, the prognosis is considered to be fair-to-guarded depending upon treatment compliance and treatment response. It was strongly suggested for the patient to have a new Depakote level drawn a week or two after discharge due to the fact that the last Depakote level drawn with the above-mentioned results was with a dose of 750 mg of Depakote DR twice a day which showed again very low Depakote blood levels.       Jc Jj MD      [id number]  D:  09/27/2019 18:57  T:  09/28/2019 11:06  JOB #:  [id number]

## 2019-10-04 ENCOUNTER — CLINICAL SUPPORT (OUTPATIENT)
Dept: FAMILY MEDICINE CLINIC | Age: 24
End: 2019-10-04

## 2019-10-04 DIAGNOSIS — Z11.1 PPD SCREENING TEST: Primary | ICD-10-CM

## 2019-10-04 NOTE — PROGRESS NOTES
PPD Placement note  Romayne American, 26 y.o. female is here today for placement of PPD test  Reason for PPD test: other  Pt taken PPD test before: no  Verified in allergy area and with patient that they are not allergic to the products PPD is made of (Phenol or Tween). Yes  Is patient taking any oral or IV steroid medication now or have they taken it in the last month? no  Has the patient ever received the BCG vaccine?: no  Has the patient been in recent contact with anyone known or suspected of having active TB disease?: no  Alert and oriented in NAD. P:  PPD placed on 10/4/2019 at 0919 am to left arm intradermally. She tolerated the procedure well. Patient caregiver advised to return for reading within 48-72 hours. She verbalized understanding. Patient left ambulatory with no complaints of pain or distress noted at this time.

## 2019-10-07 LAB
MM INDURATION POC: 0 MM (ref 0–5)
PPD POC: NEGATIVE NEGATIVE

## 2019-12-06 ENCOUNTER — TELEPHONE (OUTPATIENT)
Dept: FAMILY MEDICINE CLINIC | Age: 24
End: 2019-12-06

## 2019-12-06 NOTE — TELEPHONE ENCOUNTER
Chucho Garcia with [facility] called and needs immunization records for patients currently under her care.

## 2019-12-09 NOTE — PROGRESS NOTES
Chief Complaint   Patient presents with    Heartburn     HPI:  Inna Schultz is a 25 y.o. female who comes in today for an acute visit with complaints of heartburn. She recently started heartburn, though she denies abdominal pain, nausea, vomiting, fever, chills, CP, or SOB. Past Medical History:   Diagnosis Date    ADHD (attention deficit hyperactivity disorder)     Bipolar disorder (HCC)     Borderline personality disorder ([facility]     Mild mental retardation     PMS (premenstrual syndrome)     [facility]    PE     Allergies   Allergen Reactions    Other Medication Unknown (comments)     Birth control pills     Current Outpatient Medications   Medication Sig Dispense Refill    Omega-3-DHA-EPA-Fish Oil 300-1,000 mg cap       lurasidone (LATUDA) 40 mg tab tablet Take  by mouth.  LORazepam (ATIVAN) 1 mg tablet Take 1 mg by mouth three (3) times daily. Prescribed by [facility]      escitalopram oxalate (LEXAPRO) 20 mg tablet Take 20 mg by mouth daily. [facility] psychiatry      OXcarbazepine (TRILEPTAL) 300 mg tablet Take 900 mg by mouth nightly. [facility]      FISH -1,000 mg capsule Take 1,000 mg by mouth daily.  pravastatin (PRAVACHOL) 20 mg tablet ONE TABLET BY MOUTH AT BEDTIME -  HYPERCHOLESTEROLEMIA 30 Tab 5    haloperidol (HALDOL) 5 mg tablet   0    clindamycin-benzoyl peroxide (BENZACLIN) 1-5 % topical gel Apply  to affected area two (2) times a day. Apply to affected area after the skin has been cleansed and dried. 1 Tube 5    polyethylene glycol (MIRALAX) 17 gram/dose powder Take 17 g by mouth daily.  QUEtiapine (SEROQUEL) 50 mg tablet Take 50 mg by mouth nightly.  docusate sodium (COLACE) 100 mg capsule Take 100 mg by mouth two (2) times a day.  OXcarbazepine (TRILEPTAL) 600 mg tablet Take 1 Tab by mouth two (2) times a day. Indications: mood stabilization (Patient taking differently: Take 600 mg by mouth.  Takes with Sorry, I sent it to our RN triage by error 300 mg trileptil hs to total 900 mg prescribed by psychiatry Sarath Benoit  Indications: mood stabilization) 60 Tab 0    baclofen (LIORESAL) 10 mg tablet Take 1 Tab by mouth three (3) times daily. 30 Tab 0    orphenadrine citrate (NORFLEX) 100 mg sr tablet Take 1 Tab by mouth two (2) times a day. 28 Tab 0    pantoprazole (PROTONIX) 40 mg tablet Take 1 Tab by mouth daily. 30 Tab 5         ROS:  Pertinent as in HPI. Rest of ROS reviewed was negative. Physical Exam:  Visit Vitals    /75 (BP 1 Location: Left arm, BP Patient Position: Sitting)    Pulse (!) 103    Temp 97.8 °F (36.6 °C) (Oral)    Resp 18    Ht 5' 5\" (1.651 m)    Wt 215 lb (97.5 kg)    SpO2 95%    BMI 35.78 kg/m2       BP Readings from Last 3 Encounters:   10/15/18 117/75   09/18/18 122/74   08/10/18 138/88       General: a & o x 3, afebrile, well-nourished, interacting appropriately, in no acute distress  Respiratory: symmetrical chest expansion, lung sounds clear bilaterally, good air entry  Cardiovascular: normal S1S2, regular rate and rhythm, no murmurs  Abdomen: non-distended, normoactive bowel sounds x 4 quadrants, soft, non-tender to palpation      Lab Results   Component Value Date/Time    WBC 7.2 08/10/2018 09:50 AM    HGB 14.1 08/10/2018 09:50 AM    HCT 42.9 08/10/2018 09:50 AM    PLATELET 457 22/18/4025 09:50 AM    MCV 92.9 08/10/2018 09:50 AM       Lab Results   Component Value Date/Time    Sodium 136 08/10/2018 09:50 AM    Potassium 4.2 08/10/2018 09:50 AM    Chloride 101 08/10/2018 09:50 AM    CO2 26 08/10/2018 09:50 AM    Anion gap 9 08/10/2018 09:50 AM    Glucose 101 (H) 08/10/2018 09:50 AM    BUN 11 08/10/2018 09:50 AM    Creatinine 0.63 08/10/2018 09:50 AM    BUN/Creatinine ratio 17 08/10/2018 09:50 AM    GFR est AA >60 08/10/2018 09:50 AM    GFR est non-AA >60 08/10/2018 09:50 AM    Calcium 8.9 08/10/2018 09:50 AM    Bilirubin, total 0.1 (L) 08/10/2018 09:50 AM    AST (SGOT) 23 08/10/2018 09:50 AM    Alk.  phosphatase 144 (H) 08/10/2018 09:50 AM    Protein, total 6.5 08/10/2018 09:50 AM    Albumin 3.0 (L) 08/10/2018 09:50 AM    Globulin 3.5 08/10/2018 09:50 AM    A-G Ratio 0.9 08/10/2018 09:50 AM    ALT (SGPT) 30 08/10/2018 09:50 AM       Lab Results   Component Value Date/Time    Cholesterol, total 250 (H) 12/04/2017 12:00 AM    HDL Cholesterol 42 12/04/2017 12:00 AM    LDL, calculated 152 (H) 12/04/2017 12:00 AM    VLDL, calculated 56 (H) 12/04/2017 12:00 AM    Triglyceride 280 (H) 12/04/2017 12:00 AM    CHOL/HDL Ratio 5.0 05/31/2017 06:18 AM       No results found for: [id number]     Lab Results   Component Value Date/Time    Hemoglobin A1c 5.6 05/31/2017 06:18 AM       No results found for: TSH, TSH2, TSH3, TSHP, TSHELE, TT3, T3U, T3UP, FRT3, FT3, FT4, FT4P, T4, T4P, FT4T, TT7, TSHEXT    Assessment/Plan:    ICD-10-CM ICD-9-CM    1. Heartburn R12 787.1 pantoprazole (PROTONIX) 40 mg tablet      pantoprazole (PROTONIX) 40 mg tablet         Orders Placed This Encounter    Omega-3-DHA-EPA-Fish Oil 300-1,000 mg cap    DISCONTD: pantoprazole (PROTONIX) 40 mg tablet     Sig: Take 1 Tab by mouth daily. Dispense:  30 Tab     Refill:  0    DISCONTD: pantoprazole (PROTONIX) 40 mg tablet     Sig: Take 1 Tab by mouth daily. Dispense:  30 Tab     Refill:  0         Additional Notes: Discussed today's diagnosis, treatment plans. Discussed medication indications and side effects. After Visit Summary: Discussed provided printed patient instructions. Answered questions accordingly.   Follow-up Disposition: As previously scheduled with PCP        Sophy Vallecillo DO, MPH  Internal Medicine No

## 2020-03-04 DIAGNOSIS — E78.2 MIXED HYPERLIPIDEMIA: ICD-10-CM

## 2020-03-06 RX ORDER — PRAVASTATIN SODIUM 20 MG/1
TABLET ORAL
Qty: 30 TAB | Refills: 5 | Status: SHIPPED | OUTPATIENT
Start: 2020-03-06 | End: 2020-09-08

## 2020-03-06 RX ORDER — MULTIVITAMIN
TABLET ORAL
Qty: 30 TAB | Refills: 5 | Status: SHIPPED | OUTPATIENT
Start: 2020-03-06 | End: 2020-09-08

## 2020-03-24 ENCOUNTER — TELEPHONE (OUTPATIENT)
Dept: FAMILY MEDICINE CLINIC | Age: 25
End: 2020-03-24

## 2020-03-24 DIAGNOSIS — E66.9 OBESITY (BMI 30.0-34.9): Primary | ICD-10-CM

## 2020-03-24 NOTE — TELEPHONE ENCOUNTER
Per marivel read back order Phil Badillo, PETER Pt's appointment is canceled for tomorrow & their office will reschedule.

## 2020-03-24 NOTE — TELEPHONE ENCOUNTER
Care Giver aware that due to the [facility] Virus any visit may be postponed.

## 2020-05-19 RX ORDER — CERAMIDES 1,3,6-II
LOTION (ML) TOPICAL
Qty: 355 ML | Refills: 5 | Status: SHIPPED | OUTPATIENT
Start: 2020-05-19

## 2020-06-09 ENCOUNTER — TELEPHONE (OUTPATIENT)
Dept: FAMILY MEDICINE CLINIC | Age: 25
End: 2020-06-09

## 2020-06-09 NOTE — TELEPHONE ENCOUNTER
Brenda Sousa, [address] [place] [address] Front Office             [facility] were the pt is living, would like a office note stating the the pt appt was r/s due to covid-19. [facility] # [phone]      pts CPE was scheduled for 7/29/2020.

## 2020-08-14 ENCOUNTER — OFFICE VISIT (OUTPATIENT)
Dept: FAMILY MEDICINE CLINIC | Age: 25
End: 2020-08-14

## 2020-08-14 VITALS
WEIGHT: 237 LBS | OXYGEN SATURATION: 98 % | TEMPERATURE: 97.6 F | SYSTOLIC BLOOD PRESSURE: 120 MMHG | HEIGHT: 65 IN | HEART RATE: 120 BPM | DIASTOLIC BLOOD PRESSURE: 88 MMHG | BODY MASS INDEX: 39.49 KG/M2 | RESPIRATION RATE: 20 BRPM

## 2020-08-14 DIAGNOSIS — Z00.00 WELL WOMAN EXAM (NO GYNECOLOGICAL EXAM): Primary | ICD-10-CM

## 2020-08-14 DIAGNOSIS — R82.998 LEUKOCYTES IN URINE: ICD-10-CM

## 2020-08-14 DIAGNOSIS — E66.9 OBESITY, CLASS II, BMI 35-39.9: ICD-10-CM

## 2020-08-14 DIAGNOSIS — M79.671 PAIN OF RIGHT HEEL: ICD-10-CM

## 2020-08-14 DIAGNOSIS — R35.0 URINARY FREQUENCY: ICD-10-CM

## 2020-08-14 DIAGNOSIS — R00.0 TACHYCARDIA: ICD-10-CM

## 2020-08-14 DIAGNOSIS — E78.2 MIXED HYPERLIPIDEMIA: ICD-10-CM

## 2020-08-14 LAB
BILIRUB UR QL STRIP: NEGATIVE
GLUCOSE UR-MCNC: NEGATIVE MG/DL
KETONES P FAST UR STRIP-MCNC: NEGATIVE MG/DL
PH UR STRIP: 6.5 [PH] (ref 4.6–8)
PROT UR QL STRIP: NEGATIVE
SP GR UR STRIP: 1.01 (ref 1–1.03)
UA UROBILINOGEN AMB POC: NORMAL (ref 0.2–1)
URINALYSIS CLARITY POC: CLEAR
URINALYSIS COLOR POC: YELLOW
URINE BLOOD POC: NEGATIVE
URINE LEUKOCYTES POC: NORMAL
URINE NITRITES POC: NEGATIVE

## 2020-08-14 RX ORDER — ADAPALENE AND BENZOYL PEROXIDE .1; 2.5 G/100G; G/100G
GEL TOPICAL
COMMUNITY

## 2020-08-14 NOTE — PATIENT INSTRUCTIONS
Heel Pain: Care Instructions Your Care Instructions You can have heel pain from an injury or from everyday overuse, such as running or walking a lot. Plantar fasciitis is the most common cause of heel pain. In this condition, the bottom of your foot from the front of the heel to the base of the toes is sore and hard to walk on. Your heel can get better with rest, anti-inflammatory pain medicines, and stretching exercises. Follow-up care is a key part of your treatment and safety. Be sure to make and go to all appointments, and call your doctor if you are having problems. It's also a good idea to know your test results and keep a list of the medicines you take. How can you care for yourself at home? · Rest your feet often. Reduce your activity to a level that lets you avoid pain. If possible, do not run or walk on hard surfaces. · Take anti-inflammatory medicines to reduce heel pain. These include ibuprofen (Advil, Motrin) and naproxen (Aleve). Read and follow all instructions on the label. · Put ice or a cold pack on your heel for 10 to 20 minutes at a time. Try to do this every 1 to 2 hours for the next 3 days (when you are awake). Put a thin cloth between the ice and your skin. · If ice isn't helping after 2 or 3 days, try heat, such as a heating pad set on low. · If your doctor says it is okay, try these calf stretches. Tight calf muscles can cause heel pain or make it worse. ? Stand about 1 foot from a wall. Place the palms of both hands against the wall at chest level and lean forward against the wall. Put the leg you want to stretch about a step behind your other leg. Keep your back heel on the floor and bend your front knee until you feel a stretch in the back leg. Hold this position for 15 to 30 seconds. Repeat the exercise 2 to 4 times a session. Do 3 to 4 sessions a day. ? Sit down on the floor or a mat with your feet stretched in front of you. Roll up a towel lengthwise, and loop it over the ball of your foot. Holding the towel at both ends, gently pull the towel toward you to stretch your foot. Hold this position for 15 to 30 seconds. Repeat the exercise 2 to 4 times a session. Do 3 to 4 sessions a day. · Wear a night splint if your doctor suggests it. A night splint holds your foot with the toes pointed up. This position gives the bottom of your foot a constant, gentle stretch. · Wear shoes with good arch support. Athletic shoes or shoes with a well-cushioned sole are good choices. · Try a heel lift, heel cup or shoe insert (orthotic) to help cushion your heel. You can buy these at many shoe stores. Use them in both shoes, even if only one foot hurts. · Maintain a healthy weight. This puts less strain on your feet. When should you call for help? Call your doctor now or seek immediate medical care if: 
· You have heel pain with fever, redness, or warmth in your heel. · You have numbness or tingling in your heel. Watch closely for changes in your health, and be sure to contact your doctor if: 
· You cannot put weight on the sore foot. · Your heel pain lasts more than 2 weeks. Where can you learn more? Go to http://www.gray.com/ Enter S299 in the search box to learn more about \"Heel Pain: Care Instructions. \" Current as of: June 26, 2019               Content Version: 12.5 © 1544-0421 [employer]. Care instructions adapted under license by [employer] (which disclaims liability or warranty for this information). If you have questions about a medical condition or this instruction, always ask your healthcare professional. [address] any warranty or liability for your use of this information.

## 2020-08-14 NOTE — PROGRESS NOTES
Subjective:   22 y.o. female for Well Woman Check with caregiver at side. Patient resides in group home. Her last PAP was 2/14/208, normal.    Patient Active Problem List   Diagnosis Code    Bipolar 1 disorder ([facility] F31.9    Attention deficit hyperactivity disorder (ADHD), combined type F90.2    Severe obesity (BMI 35.0-39. 9) E66.01    Heartburn R12    Severe mixed bipolar I disorder w/psychotic features, mood-incongruent ([facility] F31.64     Patient Active Problem List    Diagnosis Date Noted    Severe mixed bipolar I disorder w/psychotic features, mood-incongruent ([facility] 09/05/2019    Heartburn 10/15/2018    Severe obesity (BMI 35.0-39.9) 09/18/2018    Attention deficit hyperactivity disorder (ADHD), combined type 05/30/2017    Bipolar 1 disorder ([facility] 05/25/2017     Current Outpatient Medications   Medication Sig Dispense Refill    adapalene-benzoyl peroxide (Epiduo) 0.1-2.5 % glwp by Apply Externally route.  CeraVe lotn APPLY TOPICALLY EVERY MORNING 355 mL 5    ONE DAILY MULTIVITAMIN tablet ONE TABLET BY MOUTH EVERY MORNING FOR VIT SUPPLEMENT 30 Tab 5    pravastatin (PRAVACHOL) 20 mg tablet ONE TABLET BY MOUTH AT BEDTIME -  HYPERCHOLESTEROLEMIA 30 Tab 5    pantoprazole (PROTONIX) 40 mg tablet ONE TABLET BY MOUTH EVERY MORNING - HEARTBURN 30 Tab 5    ibuprofen (MOTRIN) 400 mg tablet ONE TABLET BY MOUTH EVERY 4 HOURS AS NEEDED - FOR PAIN 30 Tab 5    divalproex ER (DEPAKOTE ER) 500 mg ER tablet TWO TABLETS ( 1000 MG ) BY MOUTH TWICE DAILY - BIPOLAR - DO NOT CRUSH 60 Tab 5    FISH -1,000 mg capsule ONE CAPSULE BY MOUTH EVERY MORNING - SUPPLEMENT 31 Cap 5    therapeutic multivitamin (THERAGRAN) tablet Take 1 Tab by mouth daily. Indications: Treatment To Prevent Vitamin Deficiency 30 Tab 5    haloperidol (HALDOL) 10 mg tablet Take 1 Tab by mouth two (2) times a day. Indications: psychotic disorder 30 Tab 1    divalproex DR (DEPAKOTE) 500 mg tablet Take 2 Tabs by mouth two (2) times a day. Indications: Bipolar I Disorder with Most Recent Episode Mixed 60 Tab 1    LORazepam (ATIVAN) 1 mg tablet Take 1 Tab by mouth three (3) times daily. Max Daily Amount: 3 mg. Indications: anxious 45 Tab 1    polyethylene glycol (MIRALAX) 17 gram/dose powder Give qam 510 g 1    etonogestrel (NEXPLANON) 68 mg impl 68 mg.      sunscreen lotion Use once a day 1 Bottle 0     Allergies   Allergen Reactions    Other Medication Unknown (comments)     Birth control pills     Past Medical History:   Diagnosis Date    ADHD (attention deficit hyperactivity disorder)     Bipolar disorder (HCC)     Borderline personality disorder (HCC)     Mild mental retardation     PMS (premenstrual syndrome)     Thromboembolus ([address] 2013    PE     No past surgical history on file. No family history on file.   Social History     Tobacco Use    Smoking status: Never Smoker    Smokeless tobacco: Never Used   Substance Use Topics    Alcohol use: Yes        Lab Results   Component Value Date/Time    WBC 8.0 09/03/2019 10:15 PM    HGB 15.0 09/03/2019 10:15 PM    HCT 43.4 09/03/2019 10:15 PM    PLATELET 902 83/69/7936 10:15 PM    MCV 88.4 09/03/2019 10:15 PM     Lab Results   Component Value Date/Time    Cholesterol, total 231 (H) 09/06/2019 06:10 AM    HDL Cholesterol 38 (L) 09/06/2019 06:10 AM    LDL, calculated 168 (H) 09/06/2019 06:10 AM    Triglyceride 125 09/06/2019 06:10 AM    CHOL/HDL Ratio 6.1 (H) 09/06/2019 06:10 AM     Lab Results   Component Value Date/Time    Sodium 140 09/03/2019 10:15 PM    Potassium 4.1 09/03/2019 10:15 PM    Chloride 106 09/03/2019 10:15 PM    CO2 26 09/03/2019 10:15 PM    Anion gap 8 09/03/2019 10:15 PM    Glucose 120 (H) 09/03/2019 10:15 PM    BUN 17 09/03/2019 10:15 PM    Creatinine 0.69 09/03/2019 10:15 PM    BUN/Creatinine ratio 25 (H) 09/03/2019 10:15 PM    GFR est AA >60 09/03/2019 10:15 PM    GFR est non-AA >60 09/03/2019 10:15 PM    Calcium 9.3 09/03/2019 10:15 PM    Bilirubin, total 0.6 09/10/2019 06:05 AM    ALT (SGPT) 22 09/10/2019 06:05 AM    Alk. phosphatase 136 (H) 09/10/2019 06:05 AM    Protein, total 6.0 (L) 09/10/2019 06:05 AM    Albumin 2.9 (L) 09/10/2019 06:05 AM    Globulin 3.1 09/10/2019 06:05 AM    A-G Ratio 0.9 09/10/2019 06:05 AM      Lab Results   Component Value Date/Time    Hemoglobin A1c 5.3 09/06/2019 06:10 AM         ROS: Feeling generally well. No TIA's or unusual headaches, no dysphagia. No prolonged cough. No dyspnea or chest pain on exertion. No abdominal pain, change in bowel habits, black or bloody stools. No urinary tract symptoms. No new or unusual musculoskeletal symptoms. Specific concerns today: right foot pain for the past couple of weeks. Reports has redness to heel region. Patient's caregiver also states patient has been having urinary frequency. Patient denies pain with urination. Patient's invega was recently increased in June from 156 mg to 234 mg/1.5  IM monthly     Objective: The patient appears well, alert, oriented x 3, in no distress. Visit Vitals  /88 (BP 1 Location: Left arm, BP Patient Position: Sitting)   Pulse (!) 120   Temp 97.6 °F (36.4 °C) (Temporal)   Resp 20   Ht 5' 5\" (1.651 m)   Wt 237 lb (107.5 kg)   SpO2 98%   BMI 39.44 kg/m²     ENT normal.  Neck supple. No adenopathy or thyromegaly. ANYI. Lungs are clear, good air entry, no wheezes, rhonchi or rales. S1 and S2 normal, no murmurs, regular rate and rhythm. Abdomen soft without tenderness, guarding, mass or organomegaly. Extremities show no edema, normal peripheral pulses. Neurological is normal, no focal findings. Musculoskeletal: right heel tenderness with mild erythema present  Breast and Pelvic exams are deferred. Assessment/Plan:   Well Woman  routine labs ordered, call if any problems    ICD-10-CM ICD-9-CM    1. Well woman exam (no gynecological exam)  Z00.00 V70.0     [V70.0]   2.  Mixed hyperlipidemia  E78.2 272.2 LIPID PANEL      METABOLIC PANEL, COMPREHENSIVE 3. Urinary frequency  R35.0 788.41 AMB POC URINALYSIS DIP STICK AUTO W/O MICRO   4. Obesity, Class II, BMI 35-39.9  E66.9 278.00 HEMOGLOBIN A1C WITH EAG   5. Pain of right heel  M79.671 729.5 XR CALCANEUS RT   6. Leukocytes in urine  R82.998 791.7 CULTURE, URINE     Orders Placed This Encounter    CULTURE, URINE    XR CALCANEUS RT    HEMOGLOBIN A1C WITH EAG    LIPID PANEL    METABOLIC PANEL, COMPREHENSIVE    AMB POC URINALYSIS DIP STICK AUTO W/O MICRO    adapalene-benzoyl peroxide (Epiduo) 0.1-2.5 % glwp     Advised caregiver to speak with psychiatrist regarding increased dosage of Invega given patient's increased heart rate. I have discussed the diagnosis with the patient and caregiver and the intended plan as seen in the above orders. The patient and caregiver has received an after-visit summary and questions were answered concerning future plans. I have discussed medication side effects and warnings with the patient and caregiver as well. patient and caregiver verbalizes understanding  patient and caregiver agreeable with above plan and verbalizes understanding. Follow-up and Dispositions    · Return in about 6 months (around 2/14/2021) for HLD.

## 2020-08-16 LAB
ALBUMIN SERPL-MCNC: 3.8 G/DL (ref 3.9–5)
ALBUMIN/GLOB SERPL: 1.5 {RATIO} (ref 1.2–2.2)
ALP SERPL-CCNC: 85 IU/L (ref 39–117)
ALT SERPL-CCNC: 17 IU/L (ref 0–32)
AST SERPL-CCNC: 18 IU/L (ref 0–40)
BACTERIA UR CULT: NORMAL
BILIRUB SERPL-MCNC: <0.2 MG/DL (ref 0–1.2)
BUN SERPL-MCNC: 11 MG/DL (ref 6–20)
BUN/CREAT SERPL: 17 (ref 9–23)
CALCIUM SERPL-MCNC: 9.8 MG/DL (ref 8.7–10.2)
CHLORIDE SERPL-SCNC: 103 MMOL/L (ref 96–106)
CHOLEST SERPL-MCNC: 231 MG/DL (ref 100–199)
CO2 SERPL-SCNC: 22 MMOL/L (ref 20–29)
CREAT SERPL-MCNC: 0.66 MG/DL (ref 0.57–1)
EST. AVERAGE GLUCOSE BLD GHB EST-MCNC: 128 MG/DL
GLOBULIN SER CALC-MCNC: 2.5 G/DL (ref 1.5–4.5)
GLUCOSE SERPL-MCNC: 115 MG/DL (ref 65–99)
HBA1C MFR BLD: 6.1 % (ref 4.8–5.6)
HDLC SERPL-MCNC: 38 MG/DL
LDLC SERPL CALC-MCNC: 138 MG/DL (ref 0–99)
POTASSIUM SERPL-SCNC: 4.4 MMOL/L (ref 3.5–5.2)
PROT SERPL-MCNC: 6.3 G/DL (ref 6–8.5)
SODIUM SERPL-SCNC: 142 MMOL/L (ref 134–144)
T3 SERPL-MCNC: 136 NG/DL (ref 71–180)
T4 FREE SERPL-MCNC: 0.97 NG/DL (ref 0.82–1.77)
TRIGL SERPL-MCNC: 276 MG/DL (ref 0–149)
TSH SERPL DL<=0.005 MIU/L-ACNC: 3.27 UIU/ML (ref 0.45–4.5)
VLDLC SERPL CALC-MCNC: 55 MG/DL (ref 5–40)

## 2020-09-08 DIAGNOSIS — K21.9 GASTROESOPHAGEAL REFLUX DISEASE WITHOUT ESOPHAGITIS: ICD-10-CM

## 2020-09-08 DIAGNOSIS — E78.2 MIXED HYPERLIPIDEMIA: ICD-10-CM

## 2020-09-08 RX ORDER — PRAVASTATIN SODIUM 20 MG/1
TABLET ORAL
Qty: 30 TAB | Refills: 5 | Status: SHIPPED | OUTPATIENT
Start: 2020-09-08

## 2020-09-08 RX ORDER — PANTOPRAZOLE SODIUM 40 MG/1
TABLET, DELAYED RELEASE ORAL
Qty: 30 TAB | Refills: 5 | Status: SHIPPED | OUTPATIENT
Start: 2020-09-08

## 2020-09-08 RX ORDER — MULTIVITAMIN
TABLET ORAL
Qty: 30 TAB | Refills: 5 | Status: SHIPPED | OUTPATIENT
Start: 2020-09-08

## 2020-09-16 DIAGNOSIS — M79.671 PAIN OF RIGHT HEEL: ICD-10-CM

## 2020-09-28 NOTE — MR AVS SNAPSHOT
[address] [address] [address] [place] [facility] [address] 
[phone] Patient: Mino Sequeira MRN: [id number] XCM:[id number] Visit Information Date & Time Provider Department Dept. Phone Encounter #  
 3/21/2018 10:30 AM [facility] [phone] [id number] Follow-up Instructions Return if symptoms worsen or fail to improve. Upcoming Health Maintenance Date Due  
 HPV AGE 9Y-34Y (1 of 3 - Female 3 Dose Series) 12/30/2007 DTaP/Tdap/Td series (1 - Tdap) 12/30/2017 PAP AKA CERVICAL CYTOLOGY 2/14/2021 Allergies as of 3/21/2018  Review Complete On: 3/21/2018 By: Cody Riggs NP No Known Allergies Current Immunizations  Never Reviewed Name Date Influenza Vaccine (Quad) PF 9/18/2017 TB Skin Test (PPD) Intradermal 9/18/2017 Not reviewed this visit You Were Diagnosed With   
  
 Codes Comments Acute right flank pain    -  Primary ICD-10-CM: R10.9 ICD-9-CM: 789.09, 338.19 Acute cystitis without hematuria     ICD-10-CM: N30.00 ICD-9-CM: 595.0 Antibiotic-induced yeast infection     ICD-10-CM: B37.9, T36.95XA ICD-9-CM: 112.9, E930.9 Vitals BP Pulse Temp Resp Height(growth percentile) Weight(growth percentile) 107/77 98 98.1 °F (36.7 °C) 18 5' 4\" (1.626 m) 213 lb (96.6 kg) SpO2 BMI OB Status Smoking Status 97% 36.56 kg/m2 Implant Never Smoker BMI and BSA Data Body Mass Index Body Surface Area  
 36.56 kg/m 2 2.09 m 2 Preferred Pharmacy Pharmacy Name Phone [address] [facility]. . [phone] Your Updated Medication List  
  
   
This list is accurate as of 3/21/18 11:12 AM.  Always use your most recent med list.  
  
  
  
  
 clindamycin-benzoyl peroxide 1-5 % topical gel Commonly known as:  Shamika Book Apply  to affected area two (2) times a day.  Apply to affected area after Progress Note (short form)





- Note


Progress Note: 





PULMONARY





Breathing remains improved. Drained 400mL from clamped chest tube and then 

removed the pigtail catheter.





                                   Vital Signs











 Period  Temp  Pulse  Resp  BP Sys/Thrasher  Pulse Ox


 


 Last 24 Hr  97.5 F-98.4 F  86-97  20-20  134-143/66-79  96-98











Gen:  NAD in chair


Heart: RRR


Lung: decreased breath sounds at the bases


Abd: soft, nontender


Ext: no edema





                                    CBC, BMP





                                 09/25/20 07:30 





                                 09/28/20 07:36 





Active Medications





Acetaminophen (Tylenol -)  650 mg PO Q6H PRN


   PRN Reason: PAIN LEVEL 1-5


   Last Admin: 09/27/20 21:27 Dose:  650 mg


   Documented by: 


Albuterol Sulfate (Ventolin Hfa Inhaler -)  2 puff IH Q4H PRN


   PRN Reason: SHORT OF BREATH/WHEEZING


   Last Admin: 09/09/20 22:16 Dose:  2 puff


   Documented by: 


Alprazolam (Xanax -)  0.5 mg PO Q12H PRN


   PRN Reason: ANXIETY


   Last Admin: 09/27/20 21:28 Dose:  0.5 mg


   Documented by: 


Amlodipine Besylate (Norvasc -)  10 mg PO DAILY [facility]


   Last Admin: 09/28/20 09:02 Dose:  10 mg


   Documented by: 


Aspirin (Ecotrin -)  81 mg PO DAILY [facility]


   Last Admin: 09/28/20 09:02 Dose:  81 mg


   Documented by: 


Brimonidine Tartrate (Alphagan 0.2% -)  1 drop OU BID [facility]


   Last Admin: 09/28/20 09:04 Dose:  1 drop


   Documented by: 


Carvedilol (Coreg -)  6.25 mg PO BID [facility]


   Last Admin: 09/28/20 09:02 Dose:  6.25 mg


   Documented by: 


Duloxetine HCl (Cymbalta -)  60 mg PO DAILY [facility]


   Last Admin: 09/28/20 09:02 Dose:  60 mg


   Documented by: 


Guaifenesin (Robitussin Dm -)  10 ml PO Q6H PRN


   PRN Reason: COUGH


Heparin Sodium (Porcine) (Heparin -)  5,000 unit SQ BID [facility]


   Last Admin: 09/28/20 09:02 Dose:  5,000 unit


   Documented by: 


Insulin Aspart (Novolog Vial Sliding Scale -)  1 vial SQ ACHS [facility]; Protocol


   Last Admin: 09/28/20 10:57 Dose:  6 unit


   Documented by: 


Insulin Detemir (Levemir Vial)  36 units SQ AM [facility]


   Last Admin: 09/28/20 07:47 Dose:  36 unit


   Documented by: 


Insulin Detemir (Levemir Vial)  36 units SQ HS [facility]


   Last Admin: 09/27/20 21:29 Dose:  36 unit


   Documented by: 


Latanoprost (Xalatan 0.005% Eye Drops -)  1 drop OU HS [facility]


   Last Admin: 09/27/20 21:20 Dose:  Not Given


   Documented by: 


Melatonin (Melatonin)  5 mg PO HS PRN


   PRN Reason: INSOMNIA


   Last Admin: 09/18/20 23:16 Dose:  5 mg


   Documented by: 


Multivitamins/Minerals/Vitamin C (Tab-A-Vit -)  1 tab PO DAILY [facility]


   Last Admin: 09/28/20 09:02 Dose:  1 tab


   Documented by: 


Nicotine (Nicoderm Patch -)  14 mg TD DAILY [facility]


   Last Admin: 09/28/20 09:02 Dose:  14 mg


   Documented by: 


Nitroglycerin (Nitro-Bid 2% Paste -)  1 inch TD Q6HPO [facility]


   Last Admin: 09/28/20 11:05 Dose:  1 inch


   Documented by: 


Pantoprazole Sodium (Protonix -)  40 mg PO DAILY [facility]


   Last Admin: 09/28/20 09:02 Dose:  40 mg


   Documented by: 


Polyethylene Glycol (Miralax (For Daily Use) -)  17 gm PO DAILY PRN


   PRN Reason: CONSTIPATION


   Last Admin: 09/17/20 09:37 Dose:  17 grams


   Documented by: 


Rosuvastatin Calcium (Crestor -)  10 mg PO HS [facility]


   Last Admin: 09/27/20 21:26 Dose:  10 mg


   Documented by: 


Timolol Maleate (Timoptic 0.5%)  1 drop OU BID [facility]


   Last Admin: 09/28/20 09:05 Dose:  1 drop


   Documented by: 


Tiotropium Bromide (Spiriva Respimat)  2 puff IH DAILY [facility]


   Last Admin: 09/28/20 09:05 Dose:  2 puff


   Documented by: 








A/P


Acute on Chronic Diastolic Heart Failure


Right Pleural Effusion


COPD


CKD


HTN


TOBACCO ABUSE





-  incentive spirometry


-  resume diuretics


-  monitor urine output, creatinine


-  daily weights


-  O2 to keep SpO2 >90%


-  inhaled bronchodilators as needed


-  DVT prophylaxis


-  outpt CXR and BMP in 1 week


-  can be discharged home from pulmonary standpoint the skin has been cleansed and dried. docusate sodium 100 mg capsule Commonly known as:  Josse Gleason Take 100 mg by mouth two (2) times a day. fluconazole 150 mg tablet Commonly known as:  DIFLUCAN Take 1 Tab by mouth daily for 1 day. FDA advises cautious prescribing of oral fluconazole in pregnancy. haloperidol 5 mg tablet Commonly known as:  HALDOL LORazepam 0.5 mg tablet Commonly known as:  ATIVAN Take 0.5 mg by mouth every eight (8) hours as needed for Anxiety. MIRALAX 17 gram/dose powder Generic drug:  polyethylene glycol Take 17 g by mouth daily. nitrofurantoin (macrocrystal-monohydrate) 100 mg capsule Commonly known as:  MACROBID Take 1 Cap by mouth two (2) times a day. omega 3-DHA-EPA-fish oil 1,000 mg (120 mg-180 mg) capsule Take 1,000 Caps by mouth daily. OXcarbaxepine 600 mg tablet Commonly known as:  TRILEPTAL Take 1 Tab by mouth two (2) times a day. Indications: mood stabilization  
  
 pravastatin 20 mg tablet Commonly known as:  PRAVACHOL Take 1 Tab by mouth nightly. Indications: hypercholesterolemia SEROquel 50 mg tablet Generic drug:  QUEtiapine Take 50 mg by mouth nightly. Prescriptions Printed Refills  
 nitrofurantoin, macrocrystal-monohydrate, (MACROBID) 100 mg capsule 0 Sig: Take 1 Cap by mouth two (2) times a day. Class: Print Route: Oral  
 fluconazole (DIFLUCAN) 150 mg tablet 0 Sig: Take 1 Tab by mouth daily for 1 day. FDA advises cautious prescribing of oral fluconazole in pregnancy. Class: Print Route: Oral  
  
We Performed the Following AMB POC URINALYSIS DIP STICK AUTO W/ MICRO [61029 CPT(R)] Follow-up Instructions Return if symptoms worsen or fail to improve. To-Do List   
 03/21/2018 Microbiology:  CULTURE, URINE Patient Instructions Urinary Tract Infection in Women: Care Instructions Your Care Instructions A urinary tract infection, or UTI, is a general term for an infection anywhere between the kidneys and the urethra (where urine comes out). Most UTIs are bladder infections. They often cause pain or burning when you urinate. UTIs are caused by bacteria and can be cured with antibiotics. Be sure to complete your treatment so that the infection goes away. Follow-up care is a key part of your treatment and safety. Be sure to make and go to all appointments, and call your doctor if you are having problems. It's also a good idea to know your test results and keep a list of the medicines you take. How can you care for yourself at home? · Take your antibiotics as directed. Do not stop taking them just because you feel better. You need to take the full course of antibiotics. · Drink extra water and other fluids for the next day or two. This may help wash out the bacteria that are causing the infection. (If you have kidney, heart, or liver disease and have to limit fluids, talk with your doctor before you increase your fluid intake.) · Avoid drinks that are carbonated or have caffeine. They can irritate the bladder. · Urinate often. Try to empty your bladder each time. · To relieve pain, take a hot bath or lay a heating pad set on low over your lower belly or genital area. Never go to sleep with a heating pad in place. To prevent UTIs · Drink plenty of water each day. This helps you urinate often, which clears bacteria from your system. (If you have kidney, heart, or liver disease and have to limit fluids, talk with your doctor before you increase your fluid intake.) · Urinate when you need to. · Urinate right after you have sex. · Change sanitary pads often. · Avoid douches, bubble baths, feminine hygiene sprays, and other feminine hygiene products that have deodorants. · After going to the bathroom, wipe from front to back. When should you call for help? Call your doctor now or seek immediate medical care if: 
? · Symptoms such as fever, chills, nausea, or vomiting get worse or appear for the first time. ? · You have new pain in your back just below your rib cage. This is called flank pain. ? · There is new blood or pus in your urine. ? · You have any problems with your antibiotic medicine. ? Watch closely for changes in your health, and be sure to contact your doctor if: 
? · You are not getting better after taking an antibiotic for 2 days. ? · Your symptoms go away but then come back. Where can you learn more? Go to http://paty-jennifer.info/. Enter D144 in the search box to learn more about \"Urinary Tract Infection in Women: Care Instructions. \" Current as of: May 12, 2017 Content Version: 11.4 © 9956-5840 [employer]. Care instructions adapted under license by [employer] (which disclaims liability or warranty for this information). If you have questions about a medical condition or this instruction, always ask your healthcare professional. [address] [address] any warranty or liability for your use of this information. Introducing [facility]! Dear Tere How: Thank you for requesting a [employer] account. Our records indicate that you already have an active [employer] account. You can access your account anytime at [email or web address] Did you know that you can access your hospital and ER discharge instructions at any time in [employer]? You can also review all of your test results from your hospital stay or ER visit. Additional Information If you have questions, please visit the Frequently Asked Questions section of the [employer] website at [email or web address] Remember, [employer] is NOT to be used for urgent needs. For medical emergencies, dial 911. Now available from your iPhone and Android! Please provide this summary of care documentation to your next provider. Your primary care clinician [facility]. If you have any questions after today's visit, please call [phone].

## 2020-10-27 DIAGNOSIS — K59.00 CONSTIPATION, UNSPECIFIED CONSTIPATION TYPE: ICD-10-CM

## 2020-10-27 RX ORDER — POLYETHYLENE GLYCOL 3350 17 G/17G
POWDER, FOR SOLUTION ORAL
Qty: 510 G | Refills: 5 | Status: SHIPPED | OUTPATIENT
Start: 2020-10-27

## 2020-12-03 NOTE — BSMART NOTE
OCCUPATIONAL THERAPY PROGRESS NOTE    Group time:1430    The patient was inappropriate for group. Upset and disruptive behavior on the unit. Ambulatory

## 2021-08-25 ENCOUNTER — HOSPITAL ENCOUNTER (OUTPATIENT)
Dept: PHYSICAL THERAPY | Age: 26
Discharge: HOME | End: 2021-08-25
Payer: MEDICAID

## 2021-08-25 PROCEDURE — 97163 PT EVAL HIGH COMPLEX 45 MIN: CPT

## 2021-08-25 NOTE — PROGRESS NOTES
[facility]  [address]  [facility], [address]  [phone] fax    Plan of Care/ Statement of Necessity for Physical Therapy Services    Patient name: Brady Metzger Start of Care: 2021   Referral source: Zhanna Tracy, [place] : 1995    Medical Diagnosis: Leg pain, right [M79.604]  Right ankle pain [M25.571]  Payor: BLUE CROSS MEDICAID / Plan: [employer]US / Product Type: Managed Care Medicaid /  Onset Date: 2 months ago    Treatment Diagnosis: B ankle/foot pain   Prior Hospitalization: see medical history Provider#: [id number]   Medications: Verified on Patient summary List    Comorbidities: Bipolar disorder; ADHD; club foot B; depression    Prior Level of Function: able to stand and walk with minimal pain       The Plan of Care and following information is based on the information from the initial evaluation. Assessment/ key information: 26y.o. year old female presents with CC of B foot pain that has been exacerbated over the last 2 months. Impairments noted today: decreased AROM in B ankle DF; decreased flexibility in B gastrocsoleus complex; decreased B ankle strength stability; decreased B hip strength. Patient will benefit from physical therapy to address deficits, and ultimately to return patient to prior level of function. Evaluation Complexity History HIGH Complexity :3+ comorbidities / personal factors will impact the outcome/ POC ; Examination MEDIUM Complexity : 3 Standardized tests and measures addressing body structure, function, activity limitation and / or participation in recreation  ;Presentation HIGH Complexity : Unstable and unpredictable characteristics  ; Clinical Decision Making MEDIUM Complexity : FOTO score of 26-74  Overall Complexity Rating: HIGH   Problem List: pain affecting function, decrease ROM, decrease strength, decrease ADL/ functional abilitiies, decrease activity tolerance and decrease flexibility/ joint mobility   Treatment Plan may include any combination of the following: Therapeutic exercise, Neuromuscular re-education, Physical agent/modality, Manual therapy and Patient education  Patient / Family readiness to learn indicated by: asking questions, trying to perform skills and interest  Persons(s) to be included in education: patient (P)  Barriers to Learning/Limitations: yes;  cognitive and altered mental status (i.e.Sedation, Confusion)  Patient Goal (s): decrease pain  Patient Self Reported Health Status: good  Rehabilitation Potential: good    Short Term Goals: To be accomplished in 2 weeks:  1. I and compliant with HEP for self management of symptoms. Long Term Goals: To be accomplished in 4 weeks:  1. Improve FOTO to 63 to indicate improved function with daily activities. 2.Increase B ankle DF to >= neutral to increase ease with prolonged standing for ADLs. 3. Increase B hip strength to grossly 4/5 to improve stability for ambulation. 4. Increase B ankle strength to grossly 4/5 to increase stability to perform light house work. Frequency / Duration: Patient to be seen 2 times per week for 4 weeks. Patient/ Caregiver education and instruction: Diagnosis, prognosis, exercises   [x]  Plan of care has been reviewed with LINH Wing, PT 8/25/2021 10:54 AM    ________________________________________________________________________    I certify that the above Therapy Services are being furnished while the patient is under my care. I agree with the treatment plan and certify that this therapy is necessary.     [de-identified] Signature:____________Date:_________TIME:________     Ramona Roberto DPM  ** Signature, Date and Time must be completed for valid certification **    Please sign and return to [facility] [address] [address] [address]  [place], [address]  [phone] fax

## 2021-08-25 NOTE — PROGRESS NOTES
PT DAILY TREATMENT NOTE 10-18    Patient Name: Tony Clock  Date:2021  : 1995  [x]  Patient  Verified  Payor: BLUE CROSS MEDICAID / Plan: [facility] / Product Type: Managed Care Medicaid /    In time:900  Out time:941  Total Treatment Time (min): 41  Visit #: 1 of 8    Medicare/BCBS Only   Total Timed Codes (min):  23 1:1 Treatment Time:  41       Treatment Area: Leg pain, right [M79.604]  Right ankle pain [M25.571]    SUBJECTIVE  Pain Level (0-10 scale): 10  Any medication changes, allergies to medications, adverse drug reactions, diagnosis change, or new procedure performed?: [x] No    [] Yes (see summary sheet for update)  Subjective functional status/changes:   [] No changes reported  See eval    OBJECTIVE    41 min [x]Eval                  []Re-Eval         With   [] TE   [] TA   [] neuro   [] other: Patient Education: [x] Review HEP    [] Progressed/Changed HEP based on:   [] positioning   [] body mechanics   [] transfers   [] heat/ice application    [] other:      Other Objective/Functional Measures:      Pain Level (0-10 scale) post treatment: 9    ASSESSMENT/Changes in Function: see POC    Patient will continue to benefit from skilled PT services to modify and progress therapeutic interventions, address functional mobility deficits, address ROM deficits, address strength deficits, analyze and address soft tissue restrictions and analyze and cue movement patterns to attain remaining goals. [x]  See Plan of Care  []  See progress note/recertification  []  See Discharge Summary         Progress towards goals / Updated goals:  Short Term Goals: To be accomplished in 2 weeks:  1. I and compliant with HEP for self management of symptoms. IE: issued HEP  Long Term Goals: To be accomplished in 4 weeks:  1. Improve FOTO to 63 to indicate improved function with daily activities. IE: 45  2. Increase B ankle DF to >= neutral to increase ease with prolonged standing for ADLs.  IE: right -10; left -3  3. Increase B hip strength to grossly 4/5 to improve stability for ambulation. IE: B grossly 4-/5  4. Increase B ankle strength to grossly 4/5 to increase stability to perform light house work. IE: 3+/5 B  PLAN  []  Upgrade activities as tolerated     [x]  Continue plan of care  []  Update interventions per flow sheet       []  Discharge due to:_  []  Other:_      ALESSANDRO Carrasco, CMTPT 8/25/2021  11:03 AM    No future appointments.

## 2021-09-08 ENCOUNTER — HOSPITAL ENCOUNTER (OUTPATIENT)
Dept: PHYSICAL THERAPY | Age: 26
Discharge: HOME | End: 2021-09-08
Payer: MEDICAID

## 2021-09-08 PROCEDURE — 97530 THERAPEUTIC ACTIVITIES: CPT

## 2021-09-08 PROCEDURE — 97112 NEUROMUSCULAR REEDUCATION: CPT

## 2021-09-08 PROCEDURE — 97110 THERAPEUTIC EXERCISES: CPT

## 2021-09-08 NOTE — PROGRESS NOTES
PT DAILY TREATMENT NOTE 10-18    Patient Name: Stephanie Amato  Date:2021  : 1995  [x]  Patient  Verified  Payor: BLUE CROSS MEDICAID / Plan: [facility] / Product Type: Managed Care Medicaid /    In time:1245  Out time:119  Total Treatment Time (min): 34  Visit #: 2 of 8    Medicare/BCBS Only   Total Timed Codes (min):  34 1:1 Treatment Time:  34       Treatment Area: Leg pain, right [M79.604]  Right ankle pain [M25.571]    SUBJECTIVE  Pain Level (0-10 scale): 10  Any medication changes, allergies to medications, adverse drug reactions, diagnosis change, or new procedure performed?: [x] No    [] Yes (see summary sheet for update)  Subjective functional status/changes:   [] No changes reported  I\"ve been doing my exercises. OBJECTIVE        13 min Therapeutic Exercise:  [] See flow sheet :   Rationale: increase ROM and increase strength to improve the patients ability to perform ADLs  10 min Therapeutic Activity:  []  See flow sheet :   Rationale: increase ROM, increase strength, improve coordination, improve balance and increase proprioception  to improve the patients ability to perform daily activities        8 min Neuromuscular Re-education:  []  See flow sheet :   Rationale: increase strength, improve coordination, improve balance and increase proprioception  to improve the patients stability for ADLs    3 min Manual Therapy:  Talocrural mobs/PROM B ankles   The manual therapy interventions were performed at a separate and distinct time from the therapeutic activities interventions.   Rationale: decrease pain and increase ROM to tolerate standing and walking for longer periods of time  With   [] TE   [] TA   [] neuro   [] other: Patient Education: [x] Review HEP    [] Progressed/Changed HEP based on:   [] positioning   [] body mechanics   [] transfers   [] heat/ice application    [] other:      Other Objective/Functional Measures:      Pain Level (0-10 scale) post treatment: 9    ASSESSMENT/Changes in Function: Requires tactile cueing for all exercises 2/2 cognition. Patient rushes through most exercises and requires cueing to slow down. Able to sustain arch lift in sitting, but unable to sustain while performing in standing. Patient will continue to benefit from skilled PT services to modify and progress therapeutic interventions, address functional mobility deficits, address ROM deficits, address strength deficits, analyze and address soft tissue restrictions, analyze and cue movement patterns and address imbalance/dizziness to attain remaining goals. []  See Plan of Care  []  See progress note/recertification  []  See Discharge Summary         Progress towards goals / Updated goals:  Short Term Goals: To be accomplished in 2 weeks:  1. I and compliant with HEP for self management of symptoms.   IE: issued HEP  Current: goal met 9/8/21  Long Term Goals: To be accomplished in 4 weeks:  1. Improve FOTO to 63 to indicate improved function with daily activities. IE: 45  2. Increase B ankle DF to >= neutral to increase ease with prolonged standing for ADLs. IE: right -10; left -3  3. Increase B hip strength to grossly 4/5 to improve stability for ambulation. IE: B grossly 4-/5  4. Increase B ankle strength to grossly 4/5 to increase stability to perform light house work.   IE: 3+/5 B    PLAN  []  Upgrade activities as tolerated     [x]  Continue plan of care  []  Update interventions per flow sheet       []  Discharge due to:_  []  Other:_      Esther Pi, MPT, CMTPT 9/8/2021  8:49 AM    Future Appointments   Date Time Provider Enmanuel Rich   9/8/2021 12:45 PM Matthew SHERIDAN, PT [facility]   9/14/2021 11:15 AM Darcie Null, PTA [facility]   9/17/2021 10:30 AM Juanita Mosquera, PT [facility]   9/21/2021 10:30 AM Darcie Null, PTA [facility]   9/24/2021 11:15 AM Darcie Null, PTA [facility]   9/28/2021 11:15 AM Juanita Mosquera, PT [facility] 10/1/2021 10:30 AM Ivy Landaverde, PT [facility]

## 2021-09-14 ENCOUNTER — HOSPITAL ENCOUNTER (OUTPATIENT)
Dept: PHYSICAL THERAPY | Age: 26
Discharge: HOME | End: 2021-09-14
Payer: MEDICAID

## 2021-09-14 PROCEDURE — 97112 NEUROMUSCULAR REEDUCATION: CPT

## 2021-09-14 PROCEDURE — 97530 THERAPEUTIC ACTIVITIES: CPT

## 2021-09-14 PROCEDURE — 97110 THERAPEUTIC EXERCISES: CPT

## 2021-09-14 NOTE — PROGRESS NOTES
PT DAILY TREATMENT NOTE     Patient Name: Naldo Worthy  Date:2021  : 1995  [x]  Patient  Verified  Payor: BLUE CROSS MEDICAID / Plan: [facility] HEALTHKEEPERS PLUS / Product Type: Managed Care Medicaid /    In time: 11:18  Out time:11:53  Total Treatment Time (min): 35  Visit #: 3 of 8    Treatment Area: Leg pain, right [M79.604]  Right ankle pain [M25.571]    SUBJECTIVE  Pain Level (0-10 scale): 10  Any medication changes, allergies to medications, adverse drug reactions, diagnosis change, or new procedure performed?: [x] No    [] Yes (see summary sheet for update)  Subjective functional status/changes:   [] No changes reported  Pt reports both of her feet hurt. OBJECTIVE      11 min Therapeutic Exercise:  [x] See flow sheet :   Rationale: increase ROM and increase strength to improve the patients ability to perform functional task with ease. 10 min Therapeutic Activity:  [x]  See flow sheet :   Rationale: increase strength and improve coordination  to improve the patients ability to perform daily task with ease. 8 min Neuromuscular Re-education:  [x]  See flow sheet :   Rationale: increase strength, improve coordination, improve balance and increase proprioception  to improve the patients ability to perform ADL's with ease. 6 min Manual Therapy:  Talocrural mobs grade 1/PROM B ankles, STM to plantar aspect and medial arches of the B feet. The manual therapy interventions were performed at a separate and distinct time from the therapeutic activities interventions. Rationale: decrease pain, increase ROM and increase tissue extensibility to improve functional mobility with ambulation ADL's.             With   [] TE   [] TA   [] neuro   [] other: Patient Education: [x] Review HEP    [] Progressed/Changed HEP based on:   [] positioning   [] body mechanics   [] transfers   [] heat/ice application    [] other:      Other Objective/Functional Measures:      Pain Level (0-10 scale) post treatment: 2    ASSESSMENT/Changes in Function:   Continued cueing to maintain control and form with exercises. Pt reports discomfort in the right foot > left foot noted at the medial arch and at the achilles of the right foot. Pt reports good relief following manual.    Patient will continue to benefit from skilled PT services to modify and progress therapeutic interventions, address functional mobility deficits, address ROM deficits, address strength deficits, analyze and address soft tissue restrictions, analyze and cue movement patterns, analyze and modify body mechanics/ergonomics and assess and modify postural abnormalities to attain remaining goals. [x]  See Plan of Care  []  See progress note/recertification  []  See Discharge Summary         Progress towards goals / Updated goals:  Short Term Goals: To be accomplished in 2 weeks:  1. I and compliant with HEP for self management of symptoms.   IE: issued HEP  Current: goal met 9/8/21  Long Term Goals: To be accomplished in 4 weeks:  1. Improve FOTO to 63 to indicate improved function with daily activities.   IE: 38  2. Increase B ankle DF to >= neutral to increase ease with prolonged standing for ADLs. IE: right -10; left -3  3. Increase B hip strength to grossly 4/5 to improve stability for ambulation. IE: B grossly 4-/5  4. Increase B ankle strength to grossly 4/5 to increase stability to perform light house work.   IE: 3+/5 B    PLAN  []  Upgrade activities as tolerated     [x]  Continue plan of care  []  Update interventions per flow sheet       []  Discharge due to:_  []  Other:_      Kali Mandujano, PTA 9/14/2021  11:18 AM    Future Appointments   Date Time Provider Enmanuel Rich   9/17/2021 10:30 AM Rianna Chaney, PT [facility]   9/21/2021 10:30 AM Kayla Tran PTA [facility]   9/24/2021 11:15 AM Kayla Tran PTA [facility]   9/28/2021 11:15 AM Rianna Chaney, PT [facility]   10/1/2021 10:30 AM Rianna Chaney, PT [facility]

## 2021-09-17 ENCOUNTER — APPOINTMENT (OUTPATIENT)
Dept: PHYSICAL THERAPY | Age: 26
End: 2021-09-17
Payer: MEDICAID

## 2021-09-21 ENCOUNTER — HOSPITAL ENCOUNTER (OUTPATIENT)
Dept: PHYSICAL THERAPY | Age: 26
Discharge: HOME | End: 2021-09-21
Payer: MEDICAID

## 2021-09-21 PROCEDURE — 97112 NEUROMUSCULAR REEDUCATION: CPT

## 2021-09-21 PROCEDURE — 97530 THERAPEUTIC ACTIVITIES: CPT

## 2021-09-21 PROCEDURE — 97110 THERAPEUTIC EXERCISES: CPT

## 2021-09-21 NOTE — PROGRESS NOTES
PT DAILY TREATMENT NOTE     Patient Name: Elfego Public  Date:2021  : 1995  [x]  Patient  Verified  Payor: BLUE CROSS MEDICAID / Plan: [facility] HEALTHKEEPERS PLUS / Product Type: Managed Care Medicaid /    In time:10:30  Out time:11:12  Total Treatment Time (min): 42  Visit #: 4 of 8    Treatment Area: Leg pain, right [M79.604]  Right ankle pain [M25.571]    SUBJECTIVE  Pain Level (0-10 scale): 6  Any medication changes, allergies to medications, adverse drug reactions, diagnosis change, or new procedure performed?: [x] No    [] Yes (see summary sheet for update)  Subjective functional status/changes:   [] No changes reported  Pt reports her feet feel \"so so\"    OBJECTIVE      18 min Therapeutic Exercise:  [x] See flow sheet :   Rationale: increase ROM and increase strength to improve the patients ability to perform functional task with ease. 10 min Therapeutic Activity:  [x]  See flow sheet :   Rationale: increase strength and improve coordination  to improve the patients ability to perform daily task with ease. 8 min Neuromuscular Re-education:  [x]  See flow sheet :   Rationale: increase strength, improve coordination, improve balance and increase proprioception  to improve the patients ability to perform ADL's with ease. 6 min Manual Therapy: Talocrural mobs grade 1/PROM B ankles, STM to plantar aspect and medial arches of the B feet. The manual therapy interventions were performed at a separate and distinct time from the therapeutic activities interventions. Rationale: decrease pain, increase ROM and increase tissue extensibility to improve functional mobility with ambulation ADL's. With   [] TE   [] TA   [] neuro   [] other: Patient Education: [x] Review HEP    [] Progressed/Changed HEP based on:   [] positioning   [] body mechanics   [] transfers   [] heat/ice application    [] other:      Other Objective/Functional Measures:    Marbles- 1x ea foot     Pain Level (0-10 scale) post treatment: 6    ASSESSMENT/Changes in Function:   Marbles added to therex this visit to continue to improve B foot strength and mobility and aid with improved arch support. Pt challenged with exercise taking one rest break when performing with the left foot due to minor discomfort at the left arch. Pt able to finish exercise with reports of no increasing pain. Increased redirection back to exercise performance required this visit however pt performed therex as required. No significant change in pain post treatment. Patient will continue to benefit from skilled PT services to modify and progress therapeutic interventions, address functional mobility deficits, address ROM deficits, address strength deficits, analyze and address soft tissue restrictions, analyze and cue movement patterns, analyze and modify body mechanics/ergonomics and assess and modify postural abnormalities to attain remaining goals. [x]  See Plan of Care  []  See progress note/recertification  []  See Discharge Summary         Progress towards goals / Updated goals:  Short Term Goals: To be accomplished in 2 weeks:  1. I and compliant with HEP for self management of symptoms.   IE: issued HEP  Current: goal met 9/8/21  Long Term Goals: To be accomplished in 4 weeks:  1. Improve FOTO to 63 to indicate improved function with daily activities.   IE: 38  2. Increase B ankle DF to >= neutral to increase ease with prolonged standing for ADLs. IE: right -10; left -3  3. Increase B hip strength to grossly 4/5 to improve stability for ambulation. IE: B grossly 4-/5  4. Increase B ankle strength to grossly 4/5 to increase stability to perform light house work.   IE: 3+/5 B    PLAN  []  Upgrade activities as tolerated     [x]  Continue plan of care  []  Update interventions per flow sheet       []  Discharge due to:_  []  Other:_      Luis Valadez, PTA 9/21/2021  10:29 AM    Future Appointments   Date Time Provider Department Center   9/21/2021 10:30 AM Rebecca Hadley, PTA [facility]   9/24/2021 11:15 AM Rebecca Hadley, PTA [facility]   9/28/2021 11:15 AM Myna Right, PT [facility]   10/1/2021 10:30 AM Myna Right, PT [facility]

## 2021-09-24 ENCOUNTER — HOSPITAL ENCOUNTER (OUTPATIENT)
Dept: PHYSICAL THERAPY | Age: 26
Discharge: HOME | End: 2021-09-24
Payer: MEDICAID

## 2021-09-24 PROCEDURE — 97110 THERAPEUTIC EXERCISES: CPT

## 2021-09-24 PROCEDURE — 97112 NEUROMUSCULAR REEDUCATION: CPT

## 2021-09-24 PROCEDURE — 97530 THERAPEUTIC ACTIVITIES: CPT

## 2021-09-24 NOTE — PROGRESS NOTES
[facility]  [address]  [place], [address]  [phone] fax    Physical Therapy Progress Note  Patient name: Ana Rodriguez Start of Care: 2021   Referral source: [facility] 1995   Medical/Treatment Diagnosis: Leg pain, right [M79.604]  Right ankle pain [M25.571]  Payor: BLUE CROSS MEDICAID / Plan: [facility]KEEPERS PL[facility] / Product Type: Managed Care Medicaid /  Onset Date: 2 months ago                  Prior Hospitalization: see medical history Provider#: [id number]   Medications: Verified on Patient Summary List    Comorbidities: Bipolar disorder; ADHD; club foot B; depression   Prior Level of Function:able to stand and walk with minimal pain    Visits from Start of Care: 5    Missed Visits:0    Progress towards goals / Updated goals:  Short Term Goals:  1. I and compliant with HEP for self management of symptoms.   IE: issued HEP  Current: goal met  [address]  1. Improve FOTO to 63 to indicate improved function with daily activities.   IE: 38  Current: ProgressingFOTO score 46  2. Increase B ankle DF to >= neutral to increase ease with prolonged standing for ADLs. IE: right -10; left -3  Current: Met  right 8, left 12  3. Increase B hip strength to grossly 4/5 to improve stability for ambulation. IE: B grossly 4-/5   Current: progressing   B hip ext 4-/5, B hip ABD 4+/5, B hip flex 4/5   4. Increase B ankle strength to grossly 4/5 to increase stability to perform light house work. IE: 3+/5 B  Current: Met  Inv/Ev 4/5, DF right 4+/5, Left 4/5, PF 4/5 B (with pain)         Updated Goals: to be achieved in 2 weeks:  1. Improve FOTO to 63 to indicate improved function with daily activities.   PN: Progressing FOTO score 46  2. Increase B hip strength to grossly 4/5 to improve stability for ambulation. PN: progressing  21 B hip ext 4-/5, B hip ABD 4+/5, B hip flex 4/5   3.  Pt will report pain less than or equal to 5/10 to aid with ease of everyday activities. ASSESSMENT/RECOMMENDATIONS:  Pt displays improved ROM and ankle strength noted with testing however continues to report pain at the B medial arches and plantar aspect of the feet. Pt also displays improved B hip ABD strength however continues to lack some hip ext strength. Continued cueing and redirection for form and control with exercises required. Pt continues to display dropped arches and tightness in the plantar aspect of the B feet. Pt to benefit from continued treatment to strengthen the arches of the feet and decrease pain to aid with ease of ADL's. [x]Continue therapy per initial plan/protocol at a frequency of  2 x per week for 2 weeks  []Continue therapy with the following recommended changes:_____________________      _____________________________________________________________________  []Discontinue therapy progressing towards or have reached established goals  []Discontinue therapy due to lack of appreciable progress towards goals  []Discontinue therapy due to lack of attendance or compliance  []Await Physician's recommendations/decisions regarding therapy  []Other:________________________________________________________________    Thank you for this referral.    Carina Merida PTA 9/24/2021 7:27 PM  NOTE TO PHYSICIAN:  [address] AND   FAX TO [facility]: [phone]  If you are unable to process this request in 24 hours please contact our office: [phone]    ? I have read the above report and request that my patient continue as recommended. ? I have read the above report and request that my patient continue therapy with the following changes/special instructions:__________________________________________________________  ? I have read the above report and request that my patient be discharged from therapy.     Physicians signature: ______________________________Date: ______Time:______     Kia Guerrero DPM

## 2021-09-24 NOTE — PROGRESS NOTES
PT DAILY TREATMENT NOTE     Patient Name: Pat Linares  Date:2021  : 1995  [x]  Patient  Verified  Payor: BLUE CROSS MEDICAID / Plan: [facility] [employer] HEALTHKEEPERS PLUS / Product Type: Managed Care Medicaid /    In time:11:18  Out time:12:01  Total Treatment Time (min): 43  Visit #: 5 of 8      Treatment Area: Leg pain, right [M79.604]  Right ankle pain [M25.571]    SUBJECTIVE  Pain Level (0-10 scale): 10  Any medication changes, allergies to medications, adverse drug reactions, diagnosis change, or new procedure performed?: [x] No    [] Yes (see summary sheet for update)  Subjective functional status/changes:   [] No changes reported  Pt reports pain in her B feet when riding the bike for a warm up. OBJECTIVE      19 min Therapeutic Exercise:  [x] See flow sheet :   Rationale: increase ROM and increase strength to improve the patients ability to perform functional task with ease. 8 min Therapeutic Activity:  [x]  See flow sheet :   Rationale: increase strength and improve coordination  to improve the patients ability to perform daily task with ease. 10 min Neuromuscular Re-education:  [x]  See flow sheet :   Rationale: increase strength, improve coordination, improve balance and increase proprioception  to improve the patients ability to perform ADL's with ease. 6 min Manual Therapy:  Talocrural mobs grade 1/PROM B ankles, STM to plantar aspect and medial arches of the B feet. The manual therapy interventions were performed at a separate and distinct time from the therapeutic activities interventions. Rationale: decrease pain, increase ROM, increase tissue extensibility and decrease trigger points to improve functional mobility with ambulation ADL's.           With   [] TE   [] TA   [] neuro   [] other: Patient Education: [x] Review HEP    [] Progressed/Changed HEP based on:   [] positioning   [] body mechanics   [] transfers   [] heat/ice application    [] other: Other Objective/Functional Measures:      Pain Level (0-10 scale) post treatment: 4    ASSESSMENT/Changes in Function:   Pt displays improved ROM and ankle strength noted with testing however continues to report pain at the B medial arches and plantar aspect of the feet. Pt also displays improved B hip ABD strength however continues to lack some hip ext strength. Continued cueing and redirection for form and control with exercises required. Pt continues to display dropped arches and tightness in the plantar aspect of the B feet. Pt to benefit from continued treatment to strengthen the arches of the feet and decrease pain to aid with ease of ADL's. Patient will continue to benefit from skilled PT services to modify and progress therapeutic interventions, address functional mobility deficits, address ROM deficits, address strength deficits, analyze and address soft tissue restrictions, analyze and cue movement patterns, analyze and modify body mechanics/ergonomics and assess and modify postural abnormalities to attain remaining goals. [x]  See Plan of Care  []  See progress note/recertification  []  See Discharge Summary         Progress towards goals / Updated goals:  Short Term Goals: To be accomplished in 2 weeks:  1. I and compliant with HEP for self management of symptoms.   IE: issued HEP  Current: goal met 9/8/21  Long Term Goals: To be accomplished in 4 weeks:  1. Improve FOTO to 63 to indicate improved function with daily activities.   IE: 38  Current: Progressing 9/24/21 FOTO score 46  2. Increase B ankle DF to >= neutral to increase ease with prolonged standing for ADLs. IE: right -10; left -3  Current: Met 9/24/21 right 8, left 12  3. Increase B hip strength to grossly 4/5 to improve stability for ambulation. IE: B grossly 4-/5   Current: progressing  9/24/21 B hip ext 4-/5, B hip ABD 4+/5, B hip flex 4/5   4.  Increase B ankle strength to grossly 4/5 to increase stability to perform light house work.   IE: 3+/5 B  Current: Met 9/24/21 Inv/Ev 4/5, DF right 4+/5, Left 4/5, PF 4/5 B (with pain)    PLAN  []  Upgrade activities as tolerated     [x]  Continue plan of care  []  Update interventions per flow sheet       []  Discharge due to:_  []  Other:_      Carina Merida, LINH 9/24/2021  11:17 AM    Future Appointments   Date Time Provider Enmanuel Rich   9/28/2021 11:15 AM [facility]   10/1/2021 10:30 AM Jennifer Manzanares, PT [facility]

## 2021-09-28 ENCOUNTER — APPOINTMENT (OUTPATIENT)
Dept: PHYSICAL THERAPY | Age: 26
End: 2021-09-28
Payer: MEDICAID

## 2021-10-01 ENCOUNTER — APPOINTMENT (OUTPATIENT)
Dept: PHYSICAL THERAPY | Age: 26
End: 2021-10-01
Payer: MEDICAID

## 2021-10-13 ENCOUNTER — HOSPITAL ENCOUNTER (OUTPATIENT)
Dept: PHYSICAL THERAPY | Age: 26
Discharge: HOME | End: 2021-10-13
Payer: MEDICAID

## 2021-10-13 PROCEDURE — 97110 THERAPEUTIC EXERCISES: CPT

## 2021-10-13 PROCEDURE — 97530 THERAPEUTIC ACTIVITIES: CPT

## 2021-10-13 PROCEDURE — 97112 NEUROMUSCULAR REEDUCATION: CPT

## 2021-10-13 NOTE — PROGRESS NOTES
PT DAILY TREATMENT NOTE 10-18    Patient Name: Raman Hernandez  Date:10/13/2021  : 1995  [x]  Patient  Verified  Payor: BLUE CROSS MEDICAID / Plan: [facility] / Product Type: Managed Care Medicaid /    In time:215  Out time:253  Total Treatment Time (min): 38  Visit #: 1 of 4    Medicare/BCBS Only   Total Timed Codes (min):  38 1:1 Treatment Time:  38       Treatment Area: Leg pain, right [M79.604]  Right ankle pain [M25.571]    SUBJECTIVE  Pain Level (0-10 scale): 10  Any medication changes, allergies to medications, adverse drug reactions, diagnosis change, or new procedure performed?: [x] No    [] Yes (see summary sheet for update)  Subjective functional status/changes:   [x] No changes reported      OBJECTIVE        13 min Therapeutic Exercise:  [] See flow sheet :   Rationale: increase ROM and increase strength to improve the patients ability to perform daily activities      8 min Neuromuscular Re-education:  []  See flow sheet :   Rationale: increase strength, improve coordination, improve balance and increase proprioception  to improve the patients stability for daily activities     12 min Therapeutic Activity:  []  See flow sheet :   Rationale: increase strength, improve coordination, improve balance and increase proprioception  to improve the patients ability to hold short foot position to improve stability for ADLs    5 min Manual Therapy:  STM B longitudinal arch and medial ankle tendons; PROM   The manual therapy interventions were performed at a separate and distinct time from the therapeutic activities interventions.   Rationale: decrease pain, increase ROM and increase tissue extensibility to decrease pain for ADLs  With   [] TE   [] TA   [] neuro   [] other: Patient Education: [x] Review HEP    [] Progressed/Changed HEP based on:   [] positioning   [] body mechanics   [] transfers   [] heat/ice application    [] other:      Other Objective/Functional Measures: Pain Level (0-10 scale) post treatment: 5    ASSESSMENT/Changes in Function: Required cueing for most exercises to slow down and perform exercises more slowly. Able to maintain short foot with cueing, but arches collapse when patient isn't being cued. Patient left with significantly less pain. Patient will continue to benefit from skilled PT services to modify and progress therapeutic interventions, address strength deficits, analyze and address soft tissue restrictions and analyze and cue movement patterns to attain remaining goals. []  See Plan of Care  []  See progress note/recertification  []  See Discharge Summary         Progress towards goals / Updated goals:  1. Improve FOTO to 63 to indicate improved function with daily activities.   PN: Progressing FOTO score 46  2. Increase B hip strength to grossly 4/5 to improve stability for ambulation. PN: progressing  9/24/21 B hip ext 4-/5, B hip ABD 4+/5, B hip flex 4/5   3. Pt will report pain less than or equal to 5/10 to aid with ease of everyday activities.   PN: 6-10/10  Current: 5/10 post treatment 10/13/21  PLAN  []  Upgrade activities as tolerated     []  Continue plan of care  []  Update interventions per flow sheet       []  Discharge due to:_  []  Other:_      Ivan Cheatham, ALESSANDRO, CMTPT 10/13/2021  9:21 AM    Future Appointments   Date Time Provider Enmanuel Rich   10/13/2021  2:15 PM Nia SHERIDAN, PT [facility]   10/15/2021 12:00 PM Scott Vela PT [facility]   10/19/2021 11:15 AM Ruslan Cruz PTA [facility]   10/21/2021 11:30 AM Ruslan Cruz, PTA [facility]   10/26/2021 12:00 PM Scott Vela PT [facility]   10/28/2021 11:30 AM Ruslan Kid, PTA [facility]

## 2021-10-15 ENCOUNTER — HOSPITAL ENCOUNTER (OUTPATIENT)
Dept: PHYSICAL THERAPY | Age: 26
Discharge: HOME | End: 2021-10-15
Payer: MEDICAID

## 2021-10-15 PROCEDURE — 97530 THERAPEUTIC ACTIVITIES: CPT

## 2021-10-15 PROCEDURE — 97110 THERAPEUTIC EXERCISES: CPT

## 2021-10-15 PROCEDURE — 97112 NEUROMUSCULAR REEDUCATION: CPT

## 2021-10-15 NOTE — PROGRESS NOTES
PT DAILY TREATMENT NOTE 10-18    Patient Name: Rohini Castellon  Date:10/15/2021  : 1995  [x]  Patient  Verified  Payor: BLUE CROSS MEDICAID / Plan: [facility] HEALTHKEEPERS PLUS / Product Type: Managed Care Medicaid /    In time:1204  Out time:1243  Total Treatment Time (min): 39  Visit #: 2 of 4    Medicare/BCBS Only   Total Timed Codes (min):  39 1:1 Treatment Time:  39       Treatment Area: Leg pain, right [M79.604]  Right ankle pain [M25.571]    SUBJECTIVE  Pain Level (0-10 scale): 5  Any medication changes, allergies to medications, adverse drug reactions, diagnosis change, or new procedure performed?: [x] No    [] Yes (see summary sheet for update)  Subjective functional status/changes:   [x] No changes reported      OBJECTIVE      16 min Therapeutic Exercise:  [] See flow sheet :   Rationale: increase strength, improve coordination, improve balance and increase proprioception to improve the patients ability to perform daily activities      8 min Neuromuscular Re-education:  []  See flow sheet :   Rationale: increase strength, improve coordination, improve balance and increase proprioception  to improve the patients ability to perform ADLs    3 min Manual Therapy:  Cross friction massage B post tib   The manual therapy interventions were performed at a separate and distinct time from the therapeutic activities interventions.   Rationale: decrease pain and increase tissue extensibility to decrease pain in standing  12 min Therapeutic Activity:  []  See flow sheet :   Rationale: increase strength, improve coordination, improve balance and increase proprioception  to improve the patients stability for daily activities     With   [] TE   [] TA   [] neuro   [] other: Patient Education: [x] Review HEP    [] Progressed/Changed HEP based on:   [] positioning   [] body mechanics   [] transfers   [] heat/ice application    [] other:      Other Objective/Functional Measures:      Pain Level (0-10 scale) post treatment: 5    ASSESSMENT/Changes in Function: Continues to lack stability in B ankles and foot intrinsics. Instructed patient's nurse (who accompanies her to therapy) to buy new shoes for patient that will give her more stability. Patient will continue to benefit from skilled PT services to address strength deficits, analyze and address soft tissue restrictions and analyze and cue movement patterns to attain remaining goals. []  See Plan of Care  []  See progress note/recertification  []  See Discharge Summary         Progress towards goals / Updated goals:  1. Improve FOTO to 63 to indicate improved function with daily activities.   PN: Progressing FOTO score 46  2. Increase B hip strength to grossly 4/5 to improve stability for ambulation. PN: progressing  9/24/21 B hip ext 4-/5, B hip ABD 4+/5, B hip flex 4/5   3. Pt will report pain less than or equal to 5/10 to aid with ease of everyday activities.   PN: 6-10/10  Current: 5/10 post treatment 10/13/21    PLAN  [x]  Upgrade activities as tolerated     []  Continue plan of care  []  Update interventions per flow sheet       []  Discharge due to:_  []  Other:_      Tone Farias, ALESSANDRO, CMTPT 10/15/2021  9:36 AM    Future Appointments   Date Time Provider Enmanuel Rich   10/15/2021 12:00 PM Naif Arellano PT [facility]   10/19/2021 11:15 AM [facility]   10/21/2021 11:30 AM [facility]ares [facility]

## 2021-10-19 ENCOUNTER — HOSPITAL ENCOUNTER (OUTPATIENT)
Dept: PHYSICAL THERAPY | Age: 26
Discharge: HOME | End: 2021-10-19
Payer: MEDICAID

## 2021-10-19 PROCEDURE — 97112 NEUROMUSCULAR REEDUCATION: CPT

## 2021-10-19 PROCEDURE — 97110 THERAPEUTIC EXERCISES: CPT

## 2021-10-19 NOTE — PROGRESS NOTES
PT DAILY TREATMENT NOTE     Patient Name: Karen Mcdonald  Date:10/19/2021  : 1995  [x]  Patient  Verified  Payor: BLUE CROSS MEDICAID / Plan: [facility] [employer] HEALTHKEEPERS PLUS / Product Type: Managed Care Medicaid /    In time:11:15  Out time:11:47  Total Treatment Time (min): 32  Visit #: 3 of 4    Treatment Area: Leg pain, right [M79.604]  Right ankle pain [M25.571]    SUBJECTIVE  Pain Level (0-10 scale): 6  Any medication changes, allergies to medications, adverse drug reactions, diagnosis change, or new procedure performed?: [x] No    [] Yes (see summary sheet for update)  Subjective functional status/changes:   [] No changes reported  Pt reports she is suppose to be going to 1 foot 2 foot soon for new shoes. OBJECTIVE    20 min Therapeutic Exercise:  [x] See flow sheet :   Rationale: increase ROM, increase strength and improve coordination to improve the patients ability to perform functional task with ease. 8 min Neuromuscular Re-education:  -  See flow sheet :   Rationale: increase strength, improve coordination, improve balance and increase proprioception  to improve the patients ability to perform ADL's with ease. 4 min Manual Therapy: STM to B medial arches   The manual therapy interventions were performed at a separate and distinct time from the therapeutic activities interventions. Rationale: decrease pain, increase ROM, increase tissue extensibility and decrease edema  to improve ambulation ADL's.     With   [] TE   [] TA   [] neuro   [] other: Patient Education: [x] Review HEP    [] Progressed/Changed HEP based on:   [] positioning   [] body mechanics   [] transfers   [] heat/ice application    [] other:      Other Objective/Functional Measures:      Pain Level (0-10 scale) post treatment: 5    ASSESSMENT/Changes in Function:   Slow progress and little carryover of control and form of exercises noted since [facility] with pt continuing to report pain at the B arches of the feet (right more than left). A continued pocket of swelling remains a the right arch and pt continues to display fallen arches. Pt is prepared for her NV to be her last visit due to limited progress and continued pain. Minor decrease in pain reported post treatment. Patient will continue to benefit from skilled PT services to modify and progress therapeutic interventions, address functional mobility deficits, address ROM deficits, address strength deficits, analyze and address soft tissue restrictions, analyze and cue movement patterns, analyze and modify body mechanics/ergonomics and assess and modify postural abnormalities to attain remaining goals. [x]  See Plan of Care  []  See progress note/recertification  []  See Discharge Summary         Progress towards goals / Updated goals:  1. Improve FOTO to 63 to indicate improved function with daily activities.   PN: Progressing FOTO score 46  2. Increase B hip strength to grossly 4/5 to improve stability for ambulation. PN: progressing  9/24/21 B hip ext 4-/5, B hip ABD 4+/5, B hip flex 4/5   3. Pt will report pain less than or equal to 5/10 to aid with ease of everyday activities.   PN: 6-10/10  Current: 5/10 post treatment 10/13/21    PLAN  []  Upgrade activities as tolerated     [x]  Continue plan of care  []  Update interventions per flow sheet       []  Discharge due to:_  []  Other:_      Skye Jacob PTA 10/19/2021  11:15 AM    Future Appointments   Date Time Provider Enmanuel Rich   10/21/2021 11:30 AM Nathaniel Lambert PTA [facility]

## 2021-10-21 ENCOUNTER — HOSPITAL ENCOUNTER (OUTPATIENT)
Dept: PHYSICAL THERAPY | Age: 26
Discharge: HOME | End: 2021-10-21
Payer: MEDICAID

## 2021-10-21 PROCEDURE — 97110 THERAPEUTIC EXERCISES: CPT

## 2021-10-21 PROCEDURE — 97112 NEUROMUSCULAR REEDUCATION: CPT

## 2021-10-21 NOTE — PROGRESS NOTES
PT DAILY TREATMENT NOTE     Patient Name: Steve Lundberg  Date:10/21/2021  : 1995  [x]  Patient  Verified  Payor: BLUE CROSS MEDICAID / Plan: [facility] HEALTHKEEPERS PLUS / Product Type: Managed Care Medicaid /    In time:11:30  Out time:12:08  Total Treatment Time (min): 38  Visit #: 4 of 4    Treatment Area: Leg pain, right [M79.604]  Right ankle pain [M25.571]    SUBJECTIVE  Pain Level (0-10 scale): 5  Any medication changes, allergies to medications, adverse drug reactions, diagnosis change, or new procedure performed?: [x] No    [] Yes (see summary sheet for update)  Subjective functional status/changes:   [] No changes reported  Pt reports her feet feel better today but still reports about a 5/10     OBJECTIVE    21 min Therapeutic Exercise:  [x] See flow sheet :   Rationale: increase ROM and increase strength to improve the patients ability to perform functional task with ease. 12 min Neuromuscular Re-education:  [x]  See flow sheet :   Rationale: increase strength, improve coordination, improve balance and increase proprioception  to improve the patients ability to perform ADL's with ease. 5 min Manual Therapy:  STM to B medial arches   The manual therapy interventions were performed at a separate and distinct time from the therapeutic activities interventions. Rationale: decrease pain, increase ROM, increase tissue extensibility and decrease trigger points to improve functional mobility with ambulation with ADL's. With   [] TE   [] TA   [] neuro   [] other: Patient Education: [x] Review HEP    [] Progressed/Changed HEP based on:   [] positioning   [] body mechanics   [] transfers   [] heat/ice application    [] other:      Other Objective/Functional Measures: pt discharged to manage self care at home.      Pain Level (0-10 scale) post treatment: 5    ASSESSMENT/Changes in Function:   Little progress in regards to improved pain levels in the B feet with pt reporting a consistent 5/10. Pt continued to display fallen arches and attended therapy in shoes with no arch support. Pt's care giver was informed multiple times that the patient would benefit from shoes with more arch support but upon discharge no changes were made. Pt was given an updated Hep and discharged to manage self care at home due to little progress made in therapy. Patient will continue to benefit from skilled PT services to modify and progress therapeutic interventions, address functional mobility deficits, address ROM deficits, address strength deficits, analyze and address soft tissue restrictions, analyze and cue movement patterns, analyze and modify body mechanics/ergonomics and assess and modify postural abnormalities to attain remaining goals. [x]  See Plan of Care  []  See progress note/recertification  []  See Discharge Summary    Progress towards goals / Updated goals:  1. Improve FOTO to 63 to indicate improved function with daily activities.   PN: Progressing FOTO score 46  Current: met 10/21/21 FOTO score 69  2. Increase B hip strength to grossly 4/5 to improve stability for ambulation. PN: progressing  9/24/21 B hip ext 4-/5, B hip ABD 4+/5, B hip flex 4/5   Current: no change 10/21/21  B hip ext 4-/5, B hip ABD 4+/5, B hip flex 4/5   3. Pt will report pain less than or equal to 5/10 to aid with ease of everyday activities.   PN: 6-10/10  Current: 5/10 post treatment 10/13/21    PLAN  []  Upgrade activities as tolerated     [x]  Continue plan of care  []  Update interventions per flow sheet       []  Discharge due to:_  []  Other:_      Skye Jacob PTA 10/21/2021  11:16 AM    Future Appointments   Date Time Provider Enmanuel Rich   10/21/2021 11:30 AM [facility]

## 2021-10-26 ENCOUNTER — APPOINTMENT (OUTPATIENT)
Dept: PHYSICAL THERAPY | Age: 26
End: 2021-10-26
Payer: MEDICAID

## 2021-10-26 NOTE — PROGRESS NOTES
[facility]  [address]  [place], [address]  [phone] fax    Physical Therapy Discharge Summary  Patient name: Lety Mac Start of Care:  2021   Referral source: Maryam Larsonsmith, [place] : 1995   Medical/Treatment Diagnosis: Leg pain, right [M79.604]  Right ankle pain [M25.571]  Payor: BLUE CROSS MEDICAID / Plan: [id number] [place] [address] / Product Type: Managed Care Medicaid /  Onset Date:2 months ago                  Prior Hospitalization: see medical history Provider#: [id number]   Medications: Verified on Patient Summary List    Comorbidities: Bipolar disorder; ADHD; club foot B; depression   Prior Level of Function:able to stand and walk with minimal pain    Visits from Start of Care: 9    Missed Visits: 1  Reporting Period : 21 to 10/21/21    Progress towards goals / Updated goals:  1. Improve FOTO to 63 to indicate improved function with daily activities.   PN: Progressing FOTO score 46  DC: met FOTO score 69  2. Increase B hip strength to grossly 4/5 to improve stability for ambulation. PN: progressing B hip ext 4-/5, B hip ABD 4+/5, B hip flex 4/5   DC: no change B hip ext 4-/5, B hip ABD 4+/5, B hip flex 4/5   3. Pt will report pain less than or equal to 5/10 to aid with ease of everyday activities. PN: 6-10/10  DC: 5/10 post treatment          ASSESSMENT/RECOMMENDATIONS:  Little progress in regards to improved pain levels in the B feet with pt reporting a consistent 5/10. Pt continued to display fallen arches and attended therapy in shoes with no arch support. Pt's care giver was informed multiple times that the patient would benefit from shoes with more arch support but upon discharge no changes were made. Pt was given an updated Hep and discharged to refer back to her MD and manage self care at home due to little progress made in therapy.      [x]Discontinue therapy: []Patient has reached or is progressing toward set goals      []Patient is non-compliant or has abdicated      [x]Due to lack of appreciable progress towards set Teja Sparks PTA 10/26/2021 12:26 PM

## 2021-10-28 ENCOUNTER — APPOINTMENT (OUTPATIENT)
Dept: PHYSICAL THERAPY | Age: 26
End: 2021-10-28
Payer: MEDICAID

## 2022-02-27 NOTE — PROGRESS NOTES
HISTORY OF PRESENT ILLNESS  Mark Pinto is a 26 y.o. female. Patient presents for hospital follow up. HPI  Pt seen in ED 9-3-19 [facility] for almost 2 weeks for her bipolar. Medications were adjusted. STOP:  Trileptal 300mg and 600mg  Seroquel 50mg  Latuda 40mg  Haldol 5mg injection prn  Lexapro 20mg  DOK  Benzaclins  Biotin    Review of Systems   Constitutional: Negative. Respiratory: Negative. Cardiovascular: Negative. Gastrointestinal: Negative. Genitourinary: Negative. Psychiatric/Behavioral: Negative for hallucinations. The patient does not have insomnia. Visit Vitals  /82 (BP 1 Location: Left arm)   Pulse (!) 109   Temp 98.1 °F (36.7 °C) (Oral)   Resp 16   Ht 5' 5\" (1.651 m)   Wt 204 lb 6.4 oz (92.7 kg)   SpO2 98%   BMI 34.01 kg/m²       Physical Exam   Constitutional: She appears well-developed. No distress. Cardiovascular: Normal rate, regular rhythm and normal heart sounds. No murmur heard. Pulmonary/Chest: Effort normal and breath sounds normal. No respiratory distress. She has no wheezes. She has no rales. Abdominal: Soft. Bowel sounds are normal. She exhibits no distension and no mass. There is no rebound and no guarding. ASSESSMENT and PLAN    ICD-10-CM ICD-9-CM    1. Bipolar 1 disorder (HCC) F31.9 296.7    2. Severe mixed bipolar I disorder w/psychotic features, mood-incongruent ([facility] F31.64 296.64    3. Gastroesophageal reflux disease without esophagitis K21.9 530.81 pantoprazole (PROTONIX) 40 mg tablet      DISCONTINUED: pantoprazole (PROTONIX) 40 mg tablet     PLAN:  Pt is with her Care Giver. We reviewed medication changes. Pt seems to be fine but just got home. Forms completed. I have discussed the diagnosis with the patient and the intended plan as seen in the above orders. The patient has received an after-visit summary and questions were answered concerning future plans.   I have discussed medication side effects and warnings with the prednsioProvider E-Visit time total (minutes): 5   patient as well. Patient will call for further questions. Follow-up and Dispositions    · Return in about 3 months (around 12/18/2019) for chronic care.

## 2022-03-18 PROBLEM — F31.64 SEVERE MIXED BIPOLAR I DISORDER W/PSYCHOTIC FEATURES, MOOD-INCONGRUENT (HCC): Status: ACTIVE | Noted: 2019-09-05

## 2022-03-18 PROBLEM — F90.2 ATTENTION DEFICIT HYPERACTIVITY DISORDER (ADHD), COMBINED TYPE: Status: ACTIVE | Noted: 2017-05-30

## 2022-03-19 PROBLEM — R12 HEARTBURN: Status: ACTIVE | Noted: 2018-10-15

## 2022-03-20 PROBLEM — F31.9 BIPOLAR 1 DISORDER (HCC): Status: ACTIVE | Noted: 2017-05-25

## 2023-05-18 ENCOUNTER — HOSPITAL ENCOUNTER (EMERGENCY)
Facility: HOSPITAL | Age: 28
Discharge: PSYCHIATRIC OR SUBSTANCE USE FACILITY | End: 2023-05-20
Attending: EMERGENCY MEDICINE
Payer: MEDICAID

## 2023-05-18 DIAGNOSIS — F44.5 PSYCHIATRIC PSEUDOSEIZURE: Primary | ICD-10-CM

## 2023-05-18 DIAGNOSIS — F31.13 BIPOLAR DISORDER WITH SEVERE MANIA (HCC): ICD-10-CM

## 2023-05-18 LAB
ALBUMIN SERPL-MCNC: 3.2 G/DL (ref 3.4–5)
ALBUMIN/GLOB SERPL: 0.9 (ref 0.8–1.7)
ALP SERPL-CCNC: 84 U/L (ref 45–117)
ALT SERPL-CCNC: 28 U/L (ref 13–56)
AMPHET UR QL SCN: NEGATIVE
ANION GAP SERPL CALC-SCNC: 8 MMOL/L (ref 3–18)
APPEARANCE UR: CLEAR
AST SERPL-CCNC: 40 U/L (ref 10–38)
BACTERIA URNS QL MICRO: ABNORMAL /HPF
BARBITURATES UR QL SCN: NEGATIVE
BASOPHILS # BLD: 0 K/UL (ref 0–0.1)
BASOPHILS NFR BLD: 1 % (ref 0–2)
BENZODIAZ UR QL: NEGATIVE
BILIRUB SERPL-MCNC: 0.4 MG/DL (ref 0.2–1)
BILIRUB UR QL: NEGATIVE
BUN SERPL-MCNC: 9 MG/DL (ref 7–18)
BUN/CREAT SERPL: 15 (ref 12–20)
CALCIUM SERPL-MCNC: 9.4 MG/DL (ref 8.5–10.1)
CANNABINOIDS UR QL SCN: NEGATIVE
CHLORIDE SERPL-SCNC: 100 MMOL/L (ref 100–111)
CO2 SERPL-SCNC: 23 MMOL/L (ref 21–32)
COCAINE UR QL SCN: NEGATIVE
COLOR UR: YELLOW
CREAT SERPL-MCNC: 0.59 MG/DL (ref 0.6–1.3)
DIFFERENTIAL METHOD BLD: NORMAL
EOSINOPHIL # BLD: 0.1 K/UL (ref 0–0.4)
EOSINOPHIL NFR BLD: 1 % (ref 0–5)
EPITH CASTS URNS QL MICRO: ABNORMAL /LPF (ref 0–5)
ERYTHROCYTE [DISTWIDTH] IN BLOOD BY AUTOMATED COUNT: 12.1 % (ref 11.6–14.5)
ETHANOL SERPL-MCNC: <3 MG/DL (ref 0–3)
FLUAV RNA SPEC QL NAA+PROBE: NOT DETECTED
FLUBV RNA SPEC QL NAA+PROBE: NOT DETECTED
GLOBULIN SER CALC-MCNC: 3.7 G/DL (ref 2–4)
GLUCOSE SERPL-MCNC: 78 MG/DL (ref 74–99)
GLUCOSE UR STRIP.AUTO-MCNC: NEGATIVE MG/DL
HCG SERPL-ACNC: <1 MIU/ML (ref 0–10)
HCT VFR BLD AUTO: 38 % (ref 35–45)
HGB BLD-MCNC: 13.1 G/DL (ref 12–16)
HGB UR QL STRIP: NEGATIVE
IMM GRANULOCYTES # BLD AUTO: 0 K/UL (ref 0–0.04)
IMM GRANULOCYTES NFR BLD AUTO: 0 % (ref 0–0.5)
KETONES UR QL STRIP.AUTO: NEGATIVE MG/DL
LEUKOCYTE ESTERASE UR QL STRIP.AUTO: ABNORMAL
LYMPHOCYTES # BLD: 1.7 K/UL (ref 0.9–3.6)
LYMPHOCYTES NFR BLD: 24 % (ref 21–52)
Lab: NORMAL
MCH RBC QN AUTO: 30.4 PG (ref 24–34)
MCHC RBC AUTO-ENTMCNC: 34.5 G/DL (ref 31–37)
MCV RBC AUTO: 88.2 FL (ref 78–100)
METHADONE UR QL: NEGATIVE
MONOCYTES # BLD: 0.4 K/UL (ref 0.05–1.2)
MONOCYTES NFR BLD: 5 % (ref 3–10)
NEUTS SEG # BLD: 4.9 K/UL (ref 1.8–8)
NEUTS SEG NFR BLD: 69 % (ref 40–73)
NITRITE UR QL STRIP.AUTO: NEGATIVE
NRBC # BLD: 0 K/UL (ref 0–0.01)
NRBC BLD-RTO: 0 PER 100 WBC
OPIATES UR QL: NEGATIVE
PCP UR QL: NEGATIVE
PH UR STRIP: 6.5 (ref 5–8)
PLATELET # BLD AUTO: 239 K/UL (ref 135–420)
PMV BLD AUTO: 11.4 FL (ref 9.2–11.8)
POTASSIUM SERPL-SCNC: 5 MMOL/L (ref 3.5–5.5)
PROT SERPL-MCNC: 6.9 G/DL (ref 6.4–8.2)
PROT UR STRIP-MCNC: NEGATIVE MG/DL
RBC # BLD AUTO: 4.31 M/UL (ref 4.2–5.3)
RBC #/AREA URNS HPF: ABNORMAL /HPF (ref 0–5)
SARS-COV-2 RNA RESP QL NAA+PROBE: NOT DETECTED
SODIUM SERPL-SCNC: 131 MMOL/L (ref 136–145)
SP GR UR REFRACTOMETRY: 1.02 (ref 1–1.03)
UROBILINOGEN UR QL STRIP.AUTO: 1 EU/DL (ref 0.2–1)
WBC # BLD AUTO: 7 K/UL (ref 4.6–13.2)
WBC URNS QL MICRO: ABNORMAL /HPF (ref 0–4)

## 2023-05-18 PROCEDURE — 84702 CHORIONIC GONADOTROPIN TEST: CPT

## 2023-05-18 PROCEDURE — 99285 EMERGENCY DEPT VISIT HI MDM: CPT

## 2023-05-18 PROCEDURE — 93005 ELECTROCARDIOGRAM TRACING: CPT | Performed by: EMERGENCY MEDICINE

## 2023-05-18 PROCEDURE — 85025 COMPLETE CBC W/AUTO DIFF WBC: CPT

## 2023-05-18 PROCEDURE — 82077 ASSAY SPEC XCP UR&BREATH IA: CPT

## 2023-05-18 PROCEDURE — 80053 COMPREHEN METABOLIC PANEL: CPT

## 2023-05-18 PROCEDURE — 81001 URINALYSIS AUTO W/SCOPE: CPT

## 2023-05-18 PROCEDURE — 87636 SARSCOV2 & INF A&B AMP PRB: CPT

## 2023-05-18 PROCEDURE — 80307 DRUG TEST PRSMV CHEM ANLYZR: CPT

## 2023-05-18 PROCEDURE — 90791 PSYCH DIAGNOSTIC EVALUATION: CPT

## 2023-05-18 ASSESSMENT — LIFESTYLE VARIABLES
HOW OFTEN DO YOU HAVE A DRINK CONTAINING ALCOHOL: NEVER
HOW MANY STANDARD DRINKS CONTAINING ALCOHOL DO YOU HAVE ON A TYPICAL DAY: PATIENT DOES NOT DRINK
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
## 2023-05-18 NOTE — ED TRIAGE NOTES
Pt arrives via ems for seizure activity. Per EMS pt had 6 seizure two witnessed by ems medics. Pt c/o neck and knee pain rt seizures. Pt is intellectually disabled. [facility] from [facility] facility. Denies SOB or CP. A&Ox4 on arrival. Pt is at baseline per pt advocate in room.

## 2023-05-19 ENCOUNTER — APPOINTMENT (OUTPATIENT)
Facility: HOSPITAL | Age: 28
End: 2023-05-19
Payer: MEDICAID

## 2023-05-19 LAB
EKG ATRIAL RATE: 96 BPM
EKG DIAGNOSIS: NORMAL
EKG P AXIS: 52 DEGREES
EKG P-R INTERVAL: 156 MS
EKG Q-T INTERVAL: 334 MS
EKG QRS DURATION: 82 MS
EKG QTC CALCULATION (BAZETT): 421 MS
EKG R AXIS: 64 DEGREES
EKG T AXIS: 62 DEGREES
EKG VENTRICULAR RATE: 96 BPM

## 2023-05-19 PROCEDURE — 6360000002 HC RX W HCPCS: Performed by: EMERGENCY MEDICINE

## 2023-05-19 PROCEDURE — 71045 X-RAY EXAM CHEST 1 VIEW: CPT

## 2023-05-19 PROCEDURE — 93010 ELECTROCARDIOGRAM REPORT: CPT | Performed by: INTERNAL MEDICINE

## 2023-05-19 PROCEDURE — 83735 ASSAY OF MAGNESIUM: CPT

## 2023-05-19 PROCEDURE — 94761 N-INVAS EAR/PLS OXIMETRY MLT: CPT

## 2023-05-19 PROCEDURE — 6370000000 HC RX 637 (ALT 250 FOR IP): Performed by: EMERGENCY MEDICINE

## 2023-05-19 PROCEDURE — 6370000000 HC RX 637 (ALT 250 FOR IP)

## 2023-05-19 RX ORDER — TRAZODONE HYDROCHLORIDE 50 MG/1
50 TABLET ORAL NIGHTLY
Status: DISCONTINUED | OUTPATIENT
Start: 2023-05-19 | End: 2023-05-20 | Stop reason: HOSPADM

## 2023-05-19 RX ORDER — POLYETHYLENE GLYCOL 3350 17 G/17G
17 POWDER, FOR SOLUTION ORAL DAILY
Status: DISCONTINUED | OUTPATIENT
Start: 2023-05-19 | End: 2023-05-20 | Stop reason: HOSPADM

## 2023-05-19 RX ORDER — ACETAMINOPHEN 325 MG/1
650 TABLET ORAL
Status: COMPLETED | OUTPATIENT
Start: 2023-05-19 | End: 2023-05-19

## 2023-05-19 RX ORDER — PALIPERIDONE 3 MG/1
12 TABLET, EXTENDED RELEASE ORAL DAILY
Status: DISCONTINUED | OUTPATIENT
Start: 2023-05-19 | End: 2023-05-20 | Stop reason: HOSPADM

## 2023-05-19 RX ORDER — BUDESONIDE AND FORMOTEROL FUMARATE DIHYDRATE 80; 4.5 UG/1; UG/1
2 AEROSOL RESPIRATORY (INHALATION) 2 TIMES DAILY
COMMUNITY

## 2023-05-19 RX ORDER — CLONAZEPAM 1 MG/1
1 TABLET ORAL 3 TIMES DAILY
COMMUNITY

## 2023-05-19 RX ORDER — OXCARBAZEPINE 300 MG/1
600 TABLET, FILM COATED ORAL 2 TIMES DAILY
Status: DISCONTINUED | OUTPATIENT
Start: 2023-05-19 | End: 2023-05-20 | Stop reason: HOSPADM

## 2023-05-19 RX ORDER — POTASSIUM CHLORIDE 7.45 MG/ML
10 INJECTION INTRAVENOUS
Status: DISCONTINUED | OUTPATIENT
Start: 2023-05-19 | End: 2023-05-19

## 2023-05-19 RX ORDER — BUDESONIDE AND FORMOTEROL FUMARATE DIHYDRATE 80; 4.5 UG/1; UG/1
2 AEROSOL RESPIRATORY (INHALATION) 2 TIMES DAILY
Status: DISCONTINUED | OUTPATIENT
Start: 2023-05-19 | End: 2023-05-19 | Stop reason: CLARIF

## 2023-05-19 RX ORDER — PRAZOSIN HYDROCHLORIDE 1 MG/1
2 CAPSULE ORAL NIGHTLY
Status: DISCONTINUED | OUTPATIENT
Start: 2023-05-19 | End: 2023-05-20 | Stop reason: HOSPADM

## 2023-05-19 RX ORDER — ANTIARTHRITIC COMBINATION NO.2 900 MG
TABLET ORAL 2 TIMES DAILY
COMMUNITY

## 2023-05-19 RX ORDER — PANTOPRAZOLE SODIUM 40 MG/1
40 TABLET, DELAYED RELEASE ORAL
Status: DISCONTINUED | OUTPATIENT
Start: 2023-05-20 | End: 2023-05-20 | Stop reason: HOSPADM

## 2023-05-19 RX ORDER — PRAVASTATIN SODIUM 20 MG
80 TABLET ORAL NIGHTLY
Status: DISCONTINUED | OUTPATIENT
Start: 2023-05-19 | End: 2023-05-20 | Stop reason: HOSPADM

## 2023-05-19 RX ORDER — CLONAZEPAM 0.5 MG/1
1 TABLET ORAL 3 TIMES DAILY
Status: DISCONTINUED | OUTPATIENT
Start: 2023-05-19 | End: 2023-05-20 | Stop reason: HOSPADM

## 2023-05-19 RX ADMIN — POLYETHYLENE GLYCOL 3350 17 G: 17 POWDER, FOR SOLUTION ORAL at 13:20

## 2023-05-19 RX ADMIN — TRAZODONE HYDROCHLORIDE 50 MG: 50 TABLET ORAL at 21:43

## 2023-05-19 RX ADMIN — CLONAZEPAM 1 MG: 0.5 TABLET ORAL at 21:42

## 2023-05-19 RX ADMIN — PALIPERIDONE 12 MG: 3 TABLET, FILM COATED, EXTENDED RELEASE ORAL at 13:20

## 2023-05-19 RX ADMIN — OXCARBAZEPINE 600 MG: 300 TABLET, FILM COATED ORAL at 13:20

## 2023-05-19 RX ADMIN — ARFORMOTEROL TARTRATE: 15 SOLUTION RESPIRATORY (INHALATION) at 21:43

## 2023-05-19 RX ADMIN — CLONAZEPAM 1 MG: 0.5 TABLET ORAL at 14:45

## 2023-05-19 RX ADMIN — PRAZOSIN HYDROCHLORIDE 2 MG: 1 CAPSULE ORAL at 22:36

## 2023-05-19 RX ADMIN — OXCARBAZEPINE 600 MG: 300 TABLET, FILM COATED ORAL at 22:02

## 2023-05-19 RX ADMIN — PRAVASTATIN SODIUM 80 MG: 20 TABLET ORAL at 21:41

## 2023-05-19 RX ADMIN — ACETAMINOPHEN 325MG 650 MG: 325 TABLET ORAL at 11:45

## 2023-05-19 RX ADMIN — ARFORMOTEROL TARTRATE: 15 SOLUTION RESPIRATORY (INHALATION) at 13:20

## 2023-05-19 ASSESSMENT — PAIN - FUNCTIONAL ASSESSMENT: PAIN_FUNCTIONAL_ASSESSMENT: NONE - DENIES PAIN

## 2023-05-19 NOTE — BSMART NOTE
Crisis Note: Writer spoke with Yani Moran with [facility], [phone], who reports she will be conducting assessment tonight. LaTersa informed writer should will forward e-mail to hospital liaison [facility]

## 2023-05-19 NOTE — PROGRESS NOTES
Budesonide 0.25 mg + Arformoterol 15 mcg nebulizer was therapeutically interchanged for Symbicort 80/4.5 mcg MDI per the P &T Committee approved Therapeutic Interchanges Policy.

## 2023-05-19 NOTE — BSMART NOTE
Crisis Note: Spoke with Ms. Chelsea Garrido, [address] [facility], [phone], re: [facility] bed for patient; informed that she will contact the on-call liaison to call this writer for updates on the bed for patient.

## 2023-05-19 NOTE — BSMART NOTE
Crisis Note: Writer spoke with Tom Hunt with [facility] [phone] regarding referral to [facility]. Tom Hunt reports someone will conduct assessment tonight.

## 2023-05-19 NOTE — BSMART NOTE
Crisis Note: Spoke with Nesha, [place] [address] [facility], informed that they do have available beds. Crisis informed Skinny Rodgerss that a referral has been initiated for the [facility]. Clinicals were faxed for review. Spoke with Yaron Gutierrez, [facility] manager, [phone], informed that patient does takes Klonopin and she is her own guardian. At 9:59am: Patient was assessed by Amina Cisneros clinician. After the assessment, Yoanna informed that she will forward all information     At 10:25: Spoke with Ms. Melany Suresh and explained that [address] [place] [address] is willing to pick the patient up from ED and can be there up to 23 hours. If she is placed at the [facility], she will be transferred there from [address] [place] [address]. If she is not placed at [facility] and the time are [facility] has , patient will return back to group home and placed on a waiting list for [facility] once a bed comes available. Ms. Melany Suresh understood. Ms. Melany Suresh is currently working on getting her medications to the ED. At 10:59am: Spoke with [address] liaison, informed to start the paperwork for [facility].     Crisis spoked with Dr. Jaiden Warren regarding patient getting admitting to [address] [place] [address] and provided medical screening forms for him to complete. Crisis assisted patient with completed all forms (informed consents, acknowledgements, and authorization for disclosing). Currently waiting on  to arrive to the ED with home medications. Informed JOSÉ MIGUEL Saenz to give crisis a call when she arrives regarding additional information.

## 2023-05-20 VITALS
TEMPERATURE: 98.5 F | RESPIRATION RATE: 18 BRPM | OXYGEN SATURATION: 99 % | BODY MASS INDEX: 26.53 KG/M2 | SYSTOLIC BLOOD PRESSURE: 103 MMHG | DIASTOLIC BLOOD PRESSURE: 70 MMHG | HEIGHT: 67 IN | HEART RATE: 93 BPM | WEIGHT: 169 LBS

## 2023-05-20 LAB — MAGNESIUM SERPL-MCNC: 1.9 MG/DL (ref 1.6–2.6)

## 2023-05-20 PROCEDURE — 6360000002 HC RX W HCPCS: Performed by: EMERGENCY MEDICINE

## 2023-05-20 PROCEDURE — 6370000000 HC RX 637 (ALT 250 FOR IP): Performed by: EMERGENCY MEDICINE

## 2023-05-20 RX ADMIN — CLONAZEPAM 1 MG: 0.5 TABLET ORAL at 08:46

## 2023-05-20 RX ADMIN — PANTOPRAZOLE SODIUM 40 MG: 40 TABLET, DELAYED RELEASE ORAL at 08:46

## 2023-05-20 RX ADMIN — PALIPERIDONE 12 MG: 3 TABLET, FILM COATED, EXTENDED RELEASE ORAL at 08:46

## 2023-05-20 RX ADMIN — OXCARBAZEPINE 600 MG: 300 TABLET, FILM COATED ORAL at 10:10

## 2023-05-20 RX ADMIN — POLYETHYLENE GLYCOL 3350 17 G: 17 POWDER, FOR SOLUTION ORAL at 08:46

## 2023-05-20 RX ADMIN — ARFORMOTEROL TARTRATE: 15 SOLUTION RESPIRATORY (INHALATION) at 08:46

## 2023-05-20 ASSESSMENT — PAIN - FUNCTIONAL ASSESSMENT: PAIN_FUNCTIONAL_ASSESSMENT: NONE - DENIES PAIN

## 2023-05-20 NOTE — BSMART NOTE
Crisis Note: Georges Acosta, [address] [place] [address], [phone], informed this writer that patient clinicals have not been received, so clinicals were faxed, again to the [address] [place] [address]; after reviewing the clinicals, Stefany informed this writer that [address] [place] [address] staff person will meet with Ms. Tay Ernesto Manager, [phone], in the morning. Dr. Nancy Smith made aware.

## 2023-05-20 NOTE — BSMART NOTE
Crisis Note: Spoke with Lori Dickson, [facility] clinician, [phone], regarding pending placement. Crisis informed Lori Dickson that patient is her own guardian and she is able to make her own decisions. Meena informed that since patient has controlled narcotics that someone from the group home will need to drop off the medications to [address]. Saran spoke with Francesca Ferreira, [address] [place] [address] worker, [phone], informed her of the information that was given which Francesca Ferreira stated that she would get the medications and take it to the facility. Francesca Ferreira arrived approximately 12:00pm to retrieve the medications.

## 2023-05-20 NOTE — ED NOTES
10/07/18 1345   Recommendations/Interventions   Nutrition Recommendations (Please add HoneyShake BID while pt requiring thickened liquids   RD does not have protocol )
6:16 AM :Pt care assumed from Dr. Eric Vu , ED provider. Pt complaint(s), current treatment plan, progression and available diagnostic results have been discussed thoroughly. The patient was seen and evaluated on my shift. Rounding occurred: Yes  Intended Disposition: To Be Determined  Pending diagnostic reports and/or labs (please list): Crisis plan       Noam Ramirez MD  05/20/23 5910      Patient was accepted to the [facility] crisis stabilization center and transportation was sent to come pick her up. The patient is in no distress and has been cooperative will be discharged to this level of care this been arranged by the crisis team and outpatient services. The patient will return if at all worsened or concerned. Region [facility] staff from the [facility] is here to take the patient and will transport the patient. Workup and recommendations were reviewed with the patient and all questions were answered. The patient understands the plan and will proceed with close outpatient care. I have encouraged the patient to return if at all worsened or concerned.    Jenny Chanel DO 10:54 AM       Noam Ramirez MD  05/20/23 3269       Noam Ramirez MD  05/20/23 1701
Dr. Marsha Angelucci to bedside assessing patient     Aramis Birmingham RN  05/19/23 9611
Patient discharged at this time.      Inderjit Mishra RN  05/20/23 8650
Patient medications released to [facility] ([address] staff).       Lori Corado RN  05/20/23 8576
Patient resting quietly with eyes closed. No distress noted.  Chest rise and fall symmetrical      Sandi Pearce RN  05/20/23 4285
Patient resting quietly with eyes closed. No distress noted.  Rise and fall of chest symmetrical.      George Colon RN  05/19/23 0134
Pt received a pb&j martin and a iggy Delacruz  05/19/23 5251
Report given to   [address] Nurse Cherie Acuna RN)     Kellie Tai RN  05/20/23 5832
Spoke with Nubia Merida with crisis concerning patient transfer. Per crisis they would need a consent from her guardian concerning her medication. Kenney Fregoso  [phone] please call once guardian is present.      Randolph Smith RN  05/19/23 7815
While ambulating to restroom, patient began shaking and hitting in the floor outside of the restroom. Shaking occurred for ~1 minute. Patient placed back in bed and Dr. Solange Meléndez notified.       Lee Ann Miller RN  05/19/23 8255
Witnessed seizure x 2. Seizure pads on bed. Placed on NRB 15L.   MD notified to room     [facility]  05/18/23 8797
normal...

## 2023-05-20 NOTE — ED PROVIDER NOTES
Patient is medically cleared. She has normal vitals is up and awake and alert. She has been having pseudoseizures per team who saw her before me. She is not actively having them while I am here.      Maryuri Warner MD  05/19/23 2025
(TRILEPTAL) tablet 600 mg  600 mg Oral BID [facility] [place], DO   600 mg at 05/19/23 1320    paliperidone (INVEGA) extended release tablet 12 mg  12 mg Oral Daily [facility] [place], DO   12 mg at 05/19/23 1320    [START ON 5/20/2023] pantoprazole (PROTONIX) tablet 40 mg  40 mg Oral QAM AC [facility] [place], DO        polyethylene glycol (GLYCOLAX) packet 17 g  17 g Oral Daily [place], DO   17 g at 05/19/23 1320    traZODone (DESYREL) tablet 50 mg  50 mg Oral Nightly [facility] [place], DO        prazosin (MINIPRESS) capsule 2 mg  2 mg Oral Nightly [facility] [place], DO        pravastatin (PRAVACHOL) tablet 80 mg  80 mg Oral Nightly [facility] [place], DO        arformoterol 15 mcg-budesonide 0.25 mg neb solution   Nebulization BID [facility] [place], DO   Given at 05/19/23 1320     Current Outpatient Medications   Medication Sig Dispense Refill    budesonide-formoterol (SYMBICORT) 80-4.5 MCG/ACT AERO Inhale 2 puffs into the lungs in the morning and 2 puffs in the evening. Biotin 5000 MCG TABS Take by mouth 2 times daily      clonazePAM (KLONOPIN) 1 MG tablet Take 1 tablet by mouth in the morning, at noon, and at bedtime.  Max Daily Amount: 3 mg      Adapalene-Benzoyl Peroxide 0.1-2.5 % GEL Apply topically      albuterol sulfate HFA (PROVENTIL;VENTOLIN;PROAIR) 108 (90 Base) MCG/ACT inhaler Inhale 2 puffs into the lungs every 4 hours as needed      divalproex (DEPAKOTE ER) 500 MG extended release tablet TWO TABLETS ( 1000 MG ) BY MOUTH TWICE DAILY - BIPOLAR - DO NOT CRUSH (Patient not taking: Reported on 5/19/2023)      etonogestrel (NEXPLANON) 68 MG implant 68 mg      haloperidol (HALDOL) 10 MG tablet Take 10 mg by mouth 2 times daily (Patient not taking: Reported on 5/19/2023)      ibuprofen (ADVIL;MOTRIN) 400 MG tablet ONE TABLET BY MOUTH EVERY 4 HOURS AS NEEDED - FOR PAIN      meloxicam (MOBIC) 15 MG tablet Take 15 mg by mouth daily (Patient not taking: Reported on 5/19/2023)      metFORMIN (GLUCOPHAGE) 500 MG tablet

## 2024-02-23 ENCOUNTER — APPOINTMENT (OUTPATIENT)
Facility: HOSPITAL | Age: 29
End: 2024-02-23
Payer: MEDICAID

## 2024-02-23 ENCOUNTER — HOSPITAL ENCOUNTER (EMERGENCY)
Facility: HOSPITAL | Age: 29
Discharge: HOME | End: 2024-02-23
Attending: EMERGENCY MEDICINE
Payer: MEDICAID

## 2024-02-23 VITALS
DIASTOLIC BLOOD PRESSURE: 97 MMHG | BODY MASS INDEX: 33.89 KG/M2 | OXYGEN SATURATION: 100 % | RESPIRATION RATE: 21 BRPM | TEMPERATURE: 97.6 F | HEIGHT: 66 IN | HEART RATE: 70 BPM | SYSTOLIC BLOOD PRESSURE: 130 MMHG

## 2024-02-23 DIAGNOSIS — R55 SYNCOPE AND COLLAPSE: Primary | ICD-10-CM

## 2024-02-23 LAB
ALBUMIN SERPL-MCNC: 2.9 G/DL (ref 3.4–5)
ALBUMIN/GLOB SERPL: 0.9 (ref 0.8–1.7)
ALP SERPL-CCNC: 141 U/L (ref 45–117)
ALT SERPL-CCNC: 45 U/L (ref 13–56)
AMPHET UR QL SCN: NEGATIVE
ANION GAP SERPL CALC-SCNC: 2 MMOL/L (ref 3–18)
APPEARANCE UR: CLEAR
AST SERPL-CCNC: 21 U/L (ref 10–38)
BACTERIA URNS QL MICRO: ABNORMAL /HPF
BARBITURATES UR QL SCN: NEGATIVE
BASOPHILS # BLD: 0 K/UL (ref 0–0.1)
BASOPHILS NFR BLD: 1 % (ref 0–2)
BENZODIAZ UR QL: NEGATIVE
BILIRUB SERPL-MCNC: 0.2 MG/DL (ref 0.2–1)
BILIRUB UR QL: NEGATIVE
BUN SERPL-MCNC: 17 MG/DL (ref 7–18)
BUN/CREAT SERPL: 23 (ref 12–20)
CALCIUM SERPL-MCNC: 9.1 MG/DL (ref 8.5–10.1)
CANNABINOIDS UR QL SCN: NEGATIVE
CHLORIDE SERPL-SCNC: 112 MMOL/L (ref 100–111)
CO2 SERPL-SCNC: 27 MMOL/L (ref 21–32)
COCAINE UR QL SCN: NEGATIVE
COLOR UR: YELLOW
CREAT SERPL-MCNC: 0.75 MG/DL (ref 0.6–1.3)
D DIMER PPP FEU-MCNC: 0.39 UG/ML(FEU)
DIFFERENTIAL METHOD BLD: ABNORMAL
EKG ATRIAL RATE: 80 BPM
EKG DIAGNOSIS: NORMAL
EKG P AXIS: 56 DEGREES
EKG P-R INTERVAL: 154 MS
EKG Q-T INTERVAL: 358 MS
EKG QRS DURATION: 76 MS
EKG QTC CALCULATION (BAZETT): 412 MS
EKG R AXIS: 67 DEGREES
EKG T AXIS: 70 DEGREES
EKG VENTRICULAR RATE: 80 BPM
EOSINOPHIL # BLD: 0.4 K/UL (ref 0–0.4)
EOSINOPHIL NFR BLD: 6 % (ref 0–5)
EPITH CASTS URNS QL MICRO: ABNORMAL /LPF (ref 0–5)
ERYTHROCYTE [DISTWIDTH] IN BLOOD BY AUTOMATED COUNT: 13.9 % (ref 11.6–14.5)
ETHANOL SERPL-MCNC: <3 MG/DL (ref 0–3)
FLUAV RNA SPEC QL NAA+PROBE: NOT DETECTED
FLUBV RNA SPEC QL NAA+PROBE: NOT DETECTED
GLOBULIN SER CALC-MCNC: 3.3 G/DL (ref 2–4)
GLUCOSE SERPL-MCNC: 83 MG/DL (ref 74–99)
GLUCOSE UR STRIP.AUTO-MCNC: NEGATIVE MG/DL
HCG SERPL QL: NEGATIVE
HCT VFR BLD AUTO: 40 % (ref 35–45)
HGB BLD-MCNC: 12.7 G/DL (ref 12–16)
HGB UR QL STRIP: NEGATIVE
IMM GRANULOCYTES # BLD AUTO: 0 K/UL (ref 0–0.04)
IMM GRANULOCYTES NFR BLD AUTO: 1 % (ref 0–0.5)
KETONES UR QL STRIP.AUTO: NEGATIVE MG/DL
LACTATE SERPL-SCNC: 0.6 MMOL/L (ref 0.4–2)
LACTATE SERPL-SCNC: 1.2 MMOL/L (ref 0.4–2)
LEUKOCYTE ESTERASE UR QL STRIP.AUTO: ABNORMAL
LYMPHOCYTES # BLD: 1.7 K/UL (ref 0.9–3.6)
LYMPHOCYTES NFR BLD: 26 % (ref 21–52)
Lab: NORMAL
MCH RBC QN AUTO: 29.1 PG (ref 24–34)
MCHC RBC AUTO-ENTMCNC: 31.8 G/DL (ref 31–37)
MCV RBC AUTO: 91.7 FL (ref 78–100)
METHADONE UR QL: NEGATIVE
MONOCYTES # BLD: 0.5 K/UL (ref 0.05–1.2)
MONOCYTES NFR BLD: 7 % (ref 3–10)
NEUTS SEG # BLD: 4 K/UL (ref 1.8–8)
NEUTS SEG NFR BLD: 60 % (ref 40–73)
NITRITE UR QL STRIP.AUTO: NEGATIVE
NRBC # BLD: 0 K/UL (ref 0–0.01)
NRBC BLD-RTO: 0 PER 100 WBC
NT PRO BNP: 47 PG/ML (ref 0–450)
OPIATES UR QL: NEGATIVE
PCP UR QL: NEGATIVE
PH UR STRIP: 7 (ref 5–8)
PLATELET # BLD AUTO: 236 K/UL (ref 135–420)
PMV BLD AUTO: 11.2 FL (ref 9.2–11.8)
POTASSIUM SERPL-SCNC: 4.3 MMOL/L (ref 3.5–5.5)
PROT SERPL-MCNC: 6.2 G/DL (ref 6.4–8.2)
PROT UR STRIP-MCNC: NEGATIVE MG/DL
RBC # BLD AUTO: 4.36 M/UL (ref 4.2–5.3)
RBC #/AREA URNS HPF: NEGATIVE /HPF (ref 0–5)
SARS-COV-2 RNA RESP QL NAA+PROBE: NOT DETECTED
SODIUM SERPL-SCNC: 141 MMOL/L (ref 136–145)
SP GR UR REFRACTOMETRY: 1.02 (ref 1–1.03)
TROPONIN I SERPL HS-MCNC: <3 NG/L (ref 0–54)
UROBILINOGEN UR QL STRIP.AUTO: 1 EU/DL (ref 0.2–1)
WBC # BLD AUTO: 6.5 K/UL (ref 4.6–13.2)
WBC URNS QL MICRO: ABNORMAL /HPF (ref 0–4)

## 2024-02-23 PROCEDURE — 87636 SARSCOV2 & INF A&B AMP PRB: CPT

## 2024-02-23 PROCEDURE — 85025 COMPLETE CBC W/AUTO DIFF WBC: CPT

## 2024-02-23 PROCEDURE — 71045 X-RAY EXAM CHEST 1 VIEW: CPT

## 2024-02-23 PROCEDURE — 80307 DRUG TEST PRSMV CHEM ANLYZR: CPT

## 2024-02-23 PROCEDURE — 85379 FIBRIN DEGRADATION QUANT: CPT

## 2024-02-23 PROCEDURE — 94761 N-INVAS EAR/PLS OXIMETRY MLT: CPT

## 2024-02-23 PROCEDURE — 83880 ASSAY OF NATRIURETIC PEPTIDE: CPT

## 2024-02-23 PROCEDURE — 84703 CHORIONIC GONADOTROPIN ASSAY: CPT

## 2024-02-23 PROCEDURE — 99285 EMERGENCY DEPT VISIT HI MDM: CPT

## 2024-02-23 PROCEDURE — 83605 ASSAY OF LACTIC ACID: CPT

## 2024-02-23 PROCEDURE — 80053 COMPREHEN METABOLIC PANEL: CPT

## 2024-02-23 PROCEDURE — 93010 ELECTROCARDIOGRAM REPORT: CPT | Performed by: INTERNAL MEDICINE

## 2024-02-23 PROCEDURE — 93005 ELECTROCARDIOGRAM TRACING: CPT | Performed by: EMERGENCY MEDICINE

## 2024-02-23 PROCEDURE — 84484 ASSAY OF TROPONIN QUANT: CPT

## 2024-02-23 PROCEDURE — 81001 URINALYSIS AUTO W/SCOPE: CPT

## 2024-02-23 PROCEDURE — 82077 ASSAY SPEC XCP UR&BREATH IA: CPT

## 2024-02-23 NOTE — ED TRIAGE NOTES
Patient presents to the ED via EMS for loss of consciousness after standing up out of the car. Per EMS patient fell on soft grass. When EMS arrived bystanders were doing compressions. EMS states that they were doing this no longer than 5 minutes and EMS stopped them. Per EMS vital signs were stable and patient can recall having compressions done on her. Patient has a history of schizophrenia. Ems took a blood sugar of 129. Patient reports flu like symptoms and stated to EMS that her schizophrenia medications got changed today.

## 2024-02-23 NOTE — ED PROVIDER NOTES
EMERGENCY DEPARTMENT HISTORY AND PHYSICAL EXAM      Patient Name: Catina Gonzales  MRN: [id number]  YOB: 1995  Provider: Maki Brink MD  PCP: Rosa Ramirez APRN - NP   Time/Date of evaluation: 3:43 PM EST on 2/23/24    History of Presenting Illness     Chief Complaint   Patient presents with    Loss of Consciousness       History Provided By: {History Source:28433::\"Patient\"}     History (Narative):   Catina Gonzales is a 28 y.o. female {PMHx:92109::\"with a PMHX of ***\"} who presents to the emergency department  {Arrival Mode:40496} C/O *** onset ***. ***        Past History     Past Medical History:  Past Medical History:   Diagnosis Date    ADHD (attention deficit hyperactivity disorder)     Bipolar disorder (HCC)     Borderline personality disorder (HCC)     Mild mental retardation     PMS (premenstrual syndrome)     Thromboembolus (HCC) 2013    PE       Past Surgical History:  No past surgical history on file.    Family History:  No family history on file.    Social History:  Social History     Tobacco Use    Smoking status: Never    Smokeless tobacco: Never   Substance Use Topics    Alcohol use: Yes    Drug use: No       Medications:  No current facility-administered medications for this encounter.     Current Outpatient Medications   Medication Sig Dispense Refill    Adapalene-Benzoyl Peroxide 0.1-2.5 % GEL Apply 1 application topically daily (Patient not taking: Reported on 2/23/2024)      fluconazole (DIFLUCAN) 150 MG tablet Take 1 tab by mouth once, then repeat 1 tab in 5-7 d      hydrocortisone 0.5 % ointment Apply 1 application topically 2 times daily      pantoprazole (PROTONIX) 40 MG tablet ONE TABLET BY MOUTH EVERY MORNING  - HEARTBURN      polyethylene glycol (GLYCOLAX) 17 GM/SCOOP powder MIX 240ML OF LIQUID WITH 17 GM BY MOUTH EVERY MORNING  - CONSTIPATION      diphenhydrAMINE (BENADRYL) 25 MG capsule Take 1 capsule by mouth every 6 hours as needed      clindamycin (CLEOCIN T) 1

## 2024-02-23 NOTE — ED NOTES
Patient's guardian provided discharge paperwork and education. Patient's caregiver verbalized understanding with no further questions. All lines removed. NAD, patient ambulated out.

## 2024-07-08 ENCOUNTER — HOSPITAL ENCOUNTER (OUTPATIENT)
Facility: HOSPITAL | Age: 29
Discharge: HOME | End: 2024-07-11

## 2024-07-08 DIAGNOSIS — K59.00 CONSTIPATION, UNSPECIFIED CONSTIPATION TYPE: ICD-10-CM

## 2024-07-08 DIAGNOSIS — K21.9 GASTROESOPHAGEAL REFLUX DISEASE WITHOUT ESOPHAGITIS: ICD-10-CM

## 2024-07-08 DIAGNOSIS — K76.9 LIVER LESION: ICD-10-CM

## 2024-07-08 DIAGNOSIS — R79.89 ELEVATED LFTS: ICD-10-CM

## 2024-07-12 PROBLEM — F25.9 SCHIZOAFFECTIVE DISORDER (HCC): Status: ACTIVE | Noted: 2024-07-12

## 2024-07-12 RX ORDER — ALBUTEROL SULFATE 90 UG/1
4 AEROSOL, METERED RESPIRATORY (INHALATION) PRN
Status: CANCELLED | OUTPATIENT
Start: 2024-07-18

## 2024-07-12 RX ORDER — DIPHENHYDRAMINE HYDROCHLORIDE 50 MG/ML
50 INJECTION INTRAMUSCULAR; INTRAVENOUS
Status: CANCELLED | OUTPATIENT
Start: 2024-07-18

## 2024-07-12 RX ORDER — SODIUM CHLORIDE 9 MG/ML
INJECTION, SOLUTION INTRAVENOUS CONTINUOUS
Status: CANCELLED | OUTPATIENT
Start: 2024-07-18

## 2024-07-12 RX ORDER — ONDANSETRON 2 MG/ML
8 INJECTION INTRAMUSCULAR; INTRAVENOUS
Status: CANCELLED | OUTPATIENT
Start: 2024-07-18

## 2024-07-12 RX ORDER — ACETAMINOPHEN 325 MG/1
650 TABLET ORAL
Status: CANCELLED | OUTPATIENT
Start: 2024-07-18

## 2024-07-12 RX ORDER — EPINEPHRINE 1 MG/ML
0.3 INJECTION, SOLUTION, CONCENTRATE INTRAVENOUS PRN
Status: CANCELLED | OUTPATIENT
Start: 2024-07-18

## 2024-07-18 ENCOUNTER — HOSPITAL ENCOUNTER (OUTPATIENT)
Facility: HOSPITAL | Age: 29
Setting detail: INFUSION SERIES
End: 2024-07-18
Payer: MEDICAID

## 2024-07-18 VITALS
WEIGHT: 139 LBS | TEMPERATURE: 97.7 F | RESPIRATION RATE: 18 BRPM | DIASTOLIC BLOOD PRESSURE: 65 MMHG | SYSTOLIC BLOOD PRESSURE: 97 MMHG | HEART RATE: 80 BPM | OXYGEN SATURATION: 99 % | BODY MASS INDEX: 22.44 KG/M2

## 2024-07-18 DIAGNOSIS — F25.9 SCHIZOAFFECTIVE DISORDER, UNSPECIFIED TYPE (HCC): Primary | ICD-10-CM

## 2024-07-18 PROCEDURE — 96372 THER/PROPH/DIAG INJ SC/IM: CPT

## 2024-07-18 PROCEDURE — 6360000002 HC RX W HCPCS: Performed by: PHYSICIAN ASSISTANT

## 2024-07-18 RX ORDER — ACETAMINOPHEN 325 MG/1
650 TABLET ORAL
OUTPATIENT
Start: 2024-08-15

## 2024-07-18 RX ORDER — DIPHENHYDRAMINE HYDROCHLORIDE 50 MG/ML
50 INJECTION INTRAMUSCULAR; INTRAVENOUS
OUTPATIENT
Start: 2024-08-15

## 2024-07-18 RX ORDER — ONDANSETRON 2 MG/ML
8 INJECTION INTRAMUSCULAR; INTRAVENOUS
OUTPATIENT
Start: 2024-08-15

## 2024-07-18 RX ORDER — ALBUTEROL SULFATE 90 UG/1
4 AEROSOL, METERED RESPIRATORY (INHALATION) PRN
OUTPATIENT
Start: 2024-08-15

## 2024-07-18 RX ORDER — EPINEPHRINE 1 MG/ML
0.3 INJECTION, SOLUTION, CONCENTRATE INTRAVENOUS PRN
OUTPATIENT
Start: 2024-08-15

## 2024-07-18 RX ORDER — SODIUM CHLORIDE 9 MG/ML
INJECTION, SOLUTION INTRAVENOUS CONTINUOUS
OUTPATIENT
Start: 2024-08-15

## 2024-07-18 RX ADMIN — PALIPERIDONE PALMITATE 117 MG: 117 INJECTION INTRAMUSCULAR at 15:19

## 2024-07-18 NOTE — PROGRESS NOTES
[facility] Progress Note    Date: 2024    Name: Catina Gonzales    MRN: [id number]         : 1995    INVEGA SHOT Q4 WEEKS      Ms. Gonzales arrived to [facility] at 1515 in stable condition accompanied by two caregivers. Ms. Gonzales was assessed and education was provided.     [place] reviewed with caregivers and copy sent home with patient.    Ms. Gonzales's vitals were reviewed and patient was observed for 5 minutes prior to treatment.   Vitals:    24 1515   BP: 97/65   Pulse: 80   Resp: 18   Temp: 97.7 °F (36.5 °C)   SpO2: 99%     INVEGA 117 MG was administered INTRAMUSCULAR in RIGHT DELTOID.    Ms. Gonzales caregivers stated that she has been getting INVEGA at another locations and has been tolerating them well.  They politely declined to stay for 30 minute observation today.    Ms. Gonzales tolerated well, and had no complaints.  Patient armband removed and shredded.    [facility] at 1525. She is to return on THURSDAY 8/15/24 at 1500 for her next INVEGA INJECTION appointment.    LAURA VIDES RN  2024  3:30 PM

## 2024-08-10 ENCOUNTER — HOSPITAL ENCOUNTER (EMERGENCY)
Facility: HOSPITAL | Age: 29
Discharge: HOME | End: 2024-08-10
Attending: STUDENT IN AN ORGANIZED HEALTH CARE EDUCATION/TRAINING PROGRAM
Payer: MEDICAID

## 2024-08-10 VITALS
SYSTOLIC BLOOD PRESSURE: 103 MMHG | OXYGEN SATURATION: 99 % | WEIGHT: 139 LBS | TEMPERATURE: 97.7 F | HEART RATE: 73 BPM | BODY MASS INDEX: 22.34 KG/M2 | RESPIRATION RATE: 18 BRPM | HEIGHT: 66 IN | DIASTOLIC BLOOD PRESSURE: 76 MMHG

## 2024-08-10 DIAGNOSIS — Z13.9 ENCOUNTER FOR MEDICAL SCREENING EXAMINATION: Primary | ICD-10-CM

## 2024-08-10 PROCEDURE — 99283 EMERGENCY DEPT VISIT LOW MDM: CPT

## 2024-08-10 NOTE — ED NOTES
Patient's care giver presented to the Emergency Dept, and states that \"the employee was new and was not aware of how to manage patient. However, the choking behavior is normal and typical for patient\"

## 2024-08-10 NOTE — ED TRIAGE NOTES
Patient presented to the Emergency Dept via EMS initial call for choking hazard upon investigation patient want to leave group and states that the staff are mean to her ([facility]) , patient states that \"I do not want to go back to the group home they are mean to me\"   Would be first day at group home     Patient denies shortness of breath or chest pain    Patient has history of developmental delay    Patient alert and oriented x 4, patient breathes freely on room air in nil cardiopulmonary distress

## 2024-08-11 NOTE — ED PROVIDER NOTES
questions      Orders as below:  No orders of the defined types were placed in this encounter.         ED Course:            Procedures:  Procedures      Rhythm interpretation from monitor: Sinus rhythm      Social Determinants of Health: none       Supplemental Historians include: Patient, caregiver       Documentation/Prior Results Review:  Old medical records.  Nursing notes.      Discussion of Mangement with other Physicians, [facility] or Appropriate Source:      Diagnosis and Disposition     CLINICAL IMPRESSION:  1. Encounter for medical screening examination         Medication List        ASK your doctor about these medications      albuterol sulfate  (90 Base) MCG/ACT inhaler  Commonly known as: PROVENTIL;VENTOLIN;PROAIR     benztropine 1 MG tablet  Commonly known as: COGENTIN     Biotin 5000 MCG Tabs     clonazePAM 2 MG tablet  Commonly known as: KLONOPIN     * cloZAPine 25 MG tablet  Commonly known as: CLOZARIL     * cloZAPine 25 MG tablet  Commonly known as: CLOZARIL     etonogestrel 68 MG implant  Commonly known as: NEXPLANON     fluticasone-salmeterol 100-50 MCG/ACT Aepb diskus inhaler  Commonly known as: ADVAIR     guanFACINE HCl 2 MG Tabs     ibuprofen 400 MG tablet  Commonly known as: ADVIL;MOTRIN     OLANZapine 20 MG tablet  Commonly known as: ZYPREXA     pantoprazole 20 MG tablet  Commonly known as: PROTONIX     polyethylene glycol 17 GM/SCOOP powder  Commonly known as: GLYCOLAX     pravastatin 80 MG tablet  Commonly known as: PRAVACHOL     prazosin 2 MG capsule  Commonly known as: MINIPRESS     sertraline 50 MG tablet  Commonly known as: ZOLOFT     spironolactone 50 MG tablet  Commonly known as: ALDACTONE     Symbicort 80-4.5 MCG/ACT Aero  Generic drug: budesonide-formoterol           * This list has 2 medication(s) that are the same as other medications prescribed for you. Read the directions carefully, and ask your doctor or other care provider to review them with you.

## 2024-08-15 ENCOUNTER — HOSPITAL ENCOUNTER (OUTPATIENT)
Facility: HOSPITAL | Age: 29
Setting detail: INFUSION SERIES
End: 2024-08-15
Payer: MEDICAID

## 2024-08-15 VITALS
RESPIRATION RATE: 18 BRPM | TEMPERATURE: 98.2 F | OXYGEN SATURATION: 93 % | DIASTOLIC BLOOD PRESSURE: 84 MMHG | HEART RATE: 100 BPM | SYSTOLIC BLOOD PRESSURE: 117 MMHG

## 2024-08-15 DIAGNOSIS — F25.9 SCHIZOAFFECTIVE DISORDER, UNSPECIFIED TYPE (HCC): Primary | ICD-10-CM

## 2024-08-15 PROCEDURE — 96372 THER/PROPH/DIAG INJ SC/IM: CPT

## 2024-08-15 PROCEDURE — 6360000002 HC RX W HCPCS: Performed by: PHYSICIAN ASSISTANT

## 2024-08-15 RX ORDER — ACETAMINOPHEN 325 MG/1
650 TABLET ORAL
OUTPATIENT
Start: 2024-09-05

## 2024-08-15 RX ORDER — SODIUM CHLORIDE 9 MG/ML
INJECTION, SOLUTION INTRAVENOUS CONTINUOUS
OUTPATIENT
Start: 2024-09-05

## 2024-08-15 RX ORDER — ONDANSETRON 2 MG/ML
8 INJECTION INTRAMUSCULAR; INTRAVENOUS
OUTPATIENT
Start: 2024-09-05

## 2024-08-15 RX ORDER — EPINEPHRINE 1 MG/ML
0.3 INJECTION, SOLUTION, CONCENTRATE INTRAVENOUS PRN
OUTPATIENT
Start: 2024-09-05

## 2024-08-15 RX ORDER — DIPHENHYDRAMINE HYDROCHLORIDE 50 MG/ML
50 INJECTION INTRAMUSCULAR; INTRAVENOUS
OUTPATIENT
Start: 2024-09-05

## 2024-08-15 RX ORDER — ALBUTEROL SULFATE 90 UG/1
4 AEROSOL, METERED RESPIRATORY (INHALATION) PRN
OUTPATIENT
Start: 2024-09-05

## 2024-08-15 RX ADMIN — PALIPERIDONE PALMITATE 156 MG: 156 INJECTION INTRAMUSCULAR at 15:19

## 2024-08-15 NOTE — PROGRESS NOTES
[facility] Progress Note    Date: August 15, 2024    Name: Catina Gonzales    MRN: [id number]         : 1995    INVEGA INJECTION Q4 WEEKS      Ms. Gonzales arrived to [facility] at 1515 in stable condition accompanied by two caregivers. Ms. Gonzales was assessed and education was provided.     Ms. Gonzales's vitals were reviewed and patient was observed for 5 minutes prior to treatment.   Vitals:    08/15/24 1515   BP: 117/84   Pulse: 100   Resp: 18   Temp: 98.2 °F (36.8 °C)   SpO2: 93%     INVEGA 156 MG was administered INTRAMUSCULAR in RIGHT DELTOID.    Ms. Gonzales caregivers stated that she has been getting INVEGA at another locations and has been tolerating them well.  They politely declined to stay for 30 minute observation today.    Ms. Gonzales tolerated well, and had no complaints.  Patient armband removed and shredded.    [facility] at 1525. She is to return on 24 at 1500 for her next INVEGA INJECTION appointment.    LAURA VIDES RN  August 15, 2024  3:29 PM

## 2024-09-04 ENCOUNTER — HOSPITAL ENCOUNTER (OUTPATIENT)
Facility: HOSPITAL | Age: 29
Discharge: HOME | End: 2024-09-07
Payer: MEDICAID

## 2024-09-04 DIAGNOSIS — K21.9 GASTROESOPHAGEAL REFLUX DISEASE WITHOUT ESOPHAGITIS: ICD-10-CM

## 2024-09-04 DIAGNOSIS — K59.00 CONSTIPATION, UNSPECIFIED CONSTIPATION TYPE: ICD-10-CM

## 2024-09-04 DIAGNOSIS — K76.9 LIVER LESION: ICD-10-CM

## 2024-09-04 DIAGNOSIS — R79.89 ELEVATED LFTS: ICD-10-CM

## 2024-09-04 PROCEDURE — 2500000003 HC RX 250 WO HCPCS: Performed by: STUDENT IN AN ORGANIZED HEALTH CARE EDUCATION/TRAINING PROGRAM

## 2024-09-04 PROCEDURE — 6370000000 HC RX 637 (ALT 250 FOR IP): Performed by: STUDENT IN AN ORGANIZED HEALTH CARE EDUCATION/TRAINING PROGRAM

## 2024-09-04 PROCEDURE — 74018 RADEX ABDOMEN 1 VIEW: CPT

## 2024-09-04 RX ADMIN — BARIUM SULFATE 50 ML: 980 POWDER, FOR SUSPENSION ORAL at 07:52

## 2024-09-04 RX ADMIN — ANTACID/ANTIFLATULENT 1 EACH: 380; 550; 10; 10 GRANULE, EFFERVESCENT ORAL at 07:52

## 2024-09-12 ENCOUNTER — HOSPITAL ENCOUNTER (OUTPATIENT)
Facility: HOSPITAL | Age: 29
Setting detail: INFUSION SERIES
Discharge: HOME | End: 2024-09-15
Payer: MEDICAID

## 2024-09-12 VITALS
HEART RATE: 97 BPM | SYSTOLIC BLOOD PRESSURE: 119 MMHG | TEMPERATURE: 98.6 F | DIASTOLIC BLOOD PRESSURE: 79 MMHG | RESPIRATION RATE: 16 BRPM | OXYGEN SATURATION: 98 %

## 2024-09-12 DIAGNOSIS — F25.9 SCHIZOAFFECTIVE DISORDER, UNSPECIFIED TYPE (HCC): Primary | ICD-10-CM

## 2024-09-12 PROCEDURE — 6360000002 HC RX W HCPCS: Performed by: PHYSICIAN ASSISTANT

## 2024-09-12 PROCEDURE — 96372 THER/PROPH/DIAG INJ SC/IM: CPT

## 2024-09-12 RX ORDER — ONDANSETRON 2 MG/ML
8 INJECTION INTRAMUSCULAR; INTRAVENOUS
OUTPATIENT
Start: 2024-10-10

## 2024-09-12 RX ORDER — ALBUTEROL SULFATE 90 UG/1
4 INHALANT RESPIRATORY (INHALATION) PRN
OUTPATIENT
Start: 2024-10-10

## 2024-09-12 RX ORDER — EPINEPHRINE 1 MG/ML
0.3 INJECTION, SOLUTION, CONCENTRATE INTRAVENOUS PRN
OUTPATIENT
Start: 2024-10-10

## 2024-09-12 RX ORDER — SODIUM CHLORIDE 9 MG/ML
INJECTION, SOLUTION INTRAVENOUS CONTINUOUS
OUTPATIENT
Start: 2024-10-10

## 2024-09-12 RX ORDER — DIPHENHYDRAMINE HYDROCHLORIDE 50 MG/ML
50 INJECTION INTRAMUSCULAR; INTRAVENOUS
OUTPATIENT
Start: 2024-10-10

## 2024-09-12 RX ORDER — ACETAMINOPHEN 325 MG/1
650 TABLET ORAL
OUTPATIENT
Start: 2024-10-10

## 2024-09-12 RX ADMIN — PALIPERIDONE PALMITATE 234 MG: 234 INJECTION INTRAMUSCULAR at 15:18

## 2024-09-12 ASSESSMENT — PAIN DESCRIPTION - DESCRIPTORS: DESCRIPTORS: ACHING

## 2024-09-12 ASSESSMENT — PAIN - FUNCTIONAL ASSESSMENT: PAIN_FUNCTIONAL_ASSESSMENT: 0-10

## 2024-10-10 ENCOUNTER — HOSPITAL ENCOUNTER (OUTPATIENT)
Facility: HOSPITAL | Age: 29
Setting detail: INFUSION SERIES
Discharge: HOME | End: 2024-10-13
Payer: MEDICAID

## 2024-10-10 VITALS
OXYGEN SATURATION: 98 % | HEART RATE: 112 BPM | DIASTOLIC BLOOD PRESSURE: 67 MMHG | SYSTOLIC BLOOD PRESSURE: 145 MMHG | RESPIRATION RATE: 16 BRPM | TEMPERATURE: 97.3 F

## 2024-10-10 DIAGNOSIS — F25.9 SCHIZOAFFECTIVE DISORDER, UNSPECIFIED TYPE (HCC): Primary | ICD-10-CM

## 2024-10-10 PROCEDURE — 6360000002 HC RX W HCPCS: Performed by: PHYSICIAN ASSISTANT

## 2024-10-10 PROCEDURE — 96372 THER/PROPH/DIAG INJ SC/IM: CPT

## 2024-10-10 RX ORDER — DIPHENHYDRAMINE HYDROCHLORIDE 50 MG/ML
50 INJECTION INTRAMUSCULAR; INTRAVENOUS
OUTPATIENT
Start: 2024-10-10

## 2024-10-10 RX ORDER — ONDANSETRON 2 MG/ML
8 INJECTION INTRAMUSCULAR; INTRAVENOUS
OUTPATIENT
Start: 2024-10-10

## 2024-10-10 RX ORDER — ALBUTEROL SULFATE 90 UG/1
4 INHALANT RESPIRATORY (INHALATION) PRN
OUTPATIENT
Start: 2024-10-10

## 2024-10-10 RX ORDER — EPINEPHRINE 1 MG/ML
0.3 INJECTION, SOLUTION, CONCENTRATE INTRAVENOUS PRN
OUTPATIENT
Start: 2024-10-10

## 2024-10-10 RX ORDER — ACETAMINOPHEN 325 MG/1
650 TABLET ORAL
OUTPATIENT
Start: 2024-10-10

## 2024-10-10 RX ORDER — SODIUM CHLORIDE 9 MG/ML
INJECTION, SOLUTION INTRAVENOUS CONTINUOUS
OUTPATIENT
Start: 2024-10-10

## 2024-10-10 RX ADMIN — PALIPERIDONE PALMITATE 234 MG: 234 INJECTION INTRAMUSCULAR at 15:35

## 2024-10-10 NOTE — PROGRESS NOTES
[facility] Progress Note    Date: October 10, 2024    Name: Catina Gonzales    MRN: [id number]         : 1995      INVEGA INJECTION Q4 WEEKS      Ms. Gonzales arrived to [facility] at 1530 in stable condition accompanied by one caregiver.Patient hyper-verbal with flight of ideas, but co-operative, and without aggression.No complaints of pain. Caregiver advocating for patient.       Ms. Gonzales was assessed and education was provided.     Ms. Gonzales's vitals were reviewed .  Vitals:    10/10/24 1530   BP: (!) 145/67   Pulse: (!) 112   Resp: 16   Temp: 97.3 °F (36.3 °C)   SpO2: 98%     Patient's caregiver report patient tolerating Invega injections well.      Caregiver report there is no chance of pt being pregnant at this time.    INVEGA 234 MG was administered INTRAMUSCULAR in RIGHT DELTOID. No irritation or bleeding at site. Band aid to site.     Discharge instructions done with care giver, who stated understanding.    Ms. Gonzales tolerated well, and had no complaints.  Patient armband removed and shredded.    [facility]. [facility] condition at 1537 pm. She is to return on 24 at 1530 for her next INVEGA INJECTION appointment.    TANIA HURLEY RN  October 10, 2024  3:59 PM

## 2024-11-07 ENCOUNTER — HOSPITAL ENCOUNTER (OUTPATIENT)
Facility: HOSPITAL | Age: 29
Setting detail: INFUSION SERIES
Discharge: HOME | End: 2024-11-10
Payer: MEDICAID

## 2024-11-07 VITALS
OXYGEN SATURATION: 97 % | DIASTOLIC BLOOD PRESSURE: 76 MMHG | RESPIRATION RATE: 16 BRPM | TEMPERATURE: 98.5 F | SYSTOLIC BLOOD PRESSURE: 129 MMHG | HEART RATE: 106 BPM

## 2024-11-07 DIAGNOSIS — F25.9 SCHIZOAFFECTIVE DISORDER, UNSPECIFIED TYPE (HCC): Primary | ICD-10-CM

## 2024-11-07 PROCEDURE — 6360000002 HC RX W HCPCS: Performed by: PHYSICIAN ASSISTANT

## 2024-11-07 PROCEDURE — 96372 THER/PROPH/DIAG INJ SC/IM: CPT

## 2024-11-07 RX ORDER — EPINEPHRINE 1 MG/ML
0.3 INJECTION, SOLUTION, CONCENTRATE INTRAVENOUS PRN
OUTPATIENT
Start: 2024-11-28

## 2024-11-07 RX ORDER — DIPHENHYDRAMINE HYDROCHLORIDE 50 MG/ML
50 INJECTION INTRAMUSCULAR; INTRAVENOUS
OUTPATIENT
Start: 2024-11-28

## 2024-11-07 RX ORDER — ACETAMINOPHEN 325 MG/1
650 TABLET ORAL
OUTPATIENT
Start: 2024-11-28

## 2024-11-07 RX ORDER — ALBUTEROL SULFATE 90 UG/1
4 INHALANT RESPIRATORY (INHALATION) PRN
OUTPATIENT
Start: 2024-11-28

## 2024-11-07 RX ORDER — SODIUM CHLORIDE 9 MG/ML
INJECTION, SOLUTION INTRAVENOUS CONTINUOUS
OUTPATIENT
Start: 2024-11-28

## 2024-11-07 RX ORDER — ONDANSETRON 2 MG/ML
8 INJECTION INTRAMUSCULAR; INTRAVENOUS
OUTPATIENT
Start: 2024-11-28

## 2024-11-07 RX ADMIN — PALIPERIDONE PALMITATE 234 MG: 234 INJECTION INTRAMUSCULAR at 15:42

## 2024-11-07 NOTE — PROGRESS NOTES
[facility] Progress Note    Date: 2024    Name: Catina Gonzales    MRN: [id number]         : 1995    INVEGA INJECTION Q4 WEEKS      Ms. Gonzales arrived to [facility] at 1540 in stable condition accompanied by one caregiver. Ms. Gonzales was assessed and education was provided.     Ms. Gonzales's vitals were reviewed and patient was observed for 5 minutes prior to treatment.   Vitals:    24 1541   BP: 129/76   Pulse: (!) 106   Resp: 16   Temp: 98.5 °F (36.9 °C)   SpO2: 97%     INVEGA 234 MG was administered INTRAMUSCULAR in RIGHT DELTOID.    Ms. Gonzales caregivers stated that she has been getting INVEGA at another locations and has been tolerating them well.      Ms. Gonzales tolerated well, and had no complaints.  Patient armband removed and shredded.    [facility]. [facility] condition at 1550. She is to return on 24 at 1530 for her next INVEGA INJECTION appointment.    LAURA VIDES RN  2024  3:50 PM

## 2024-12-05 ENCOUNTER — HOSPITAL ENCOUNTER (OUTPATIENT)
Facility: HOSPITAL | Age: 29
Setting detail: INFUSION SERIES
Discharge: HOME | End: 2024-12-08
Payer: MEDICAID

## 2024-12-05 VITALS
TEMPERATURE: 98.1 F | DIASTOLIC BLOOD PRESSURE: 76 MMHG | HEART RATE: 101 BPM | SYSTOLIC BLOOD PRESSURE: 116 MMHG | OXYGEN SATURATION: 97 % | RESPIRATION RATE: 16 BRPM

## 2024-12-05 DIAGNOSIS — F25.9 SCHIZOAFFECTIVE DISORDER, UNSPECIFIED TYPE (HCC): Primary | ICD-10-CM

## 2024-12-05 PROCEDURE — 96372 THER/PROPH/DIAG INJ SC/IM: CPT

## 2024-12-05 PROCEDURE — 6360000002 HC RX W HCPCS: Performed by: PHYSICIAN ASSISTANT

## 2024-12-05 RX ORDER — HYDROCORTISONE SODIUM SUCCINATE 100 MG/2ML
100 INJECTION INTRAMUSCULAR; INTRAVENOUS
OUTPATIENT
Start: 2025-01-02

## 2024-12-05 RX ORDER — SODIUM CHLORIDE 9 MG/ML
INJECTION, SOLUTION INTRAVENOUS CONTINUOUS
OUTPATIENT
Start: 2025-01-02

## 2024-12-05 RX ORDER — ALBUTEROL SULFATE 90 UG/1
4 INHALANT RESPIRATORY (INHALATION) PRN
OUTPATIENT
Start: 2025-01-02

## 2024-12-05 RX ORDER — ONDANSETRON 2 MG/ML
8 INJECTION INTRAMUSCULAR; INTRAVENOUS
OUTPATIENT
Start: 2025-01-02

## 2024-12-05 RX ORDER — EPINEPHRINE 1 MG/ML
0.3 INJECTION, SOLUTION, CONCENTRATE INTRAVENOUS PRN
OUTPATIENT
Start: 2025-01-02

## 2024-12-05 RX ORDER — ACETAMINOPHEN 325 MG/1
650 TABLET ORAL
OUTPATIENT
Start: 2025-01-02

## 2024-12-05 RX ORDER — DIPHENHYDRAMINE HYDROCHLORIDE 50 MG/ML
50 INJECTION INTRAMUSCULAR; INTRAVENOUS
OUTPATIENT
Start: 2025-01-02

## 2024-12-05 RX ADMIN — PALIPERIDONE PALMITATE 234 MG: 234 INJECTION INTRAMUSCULAR at 15:50

## 2024-12-05 ASSESSMENT — PAIN - FUNCTIONAL ASSESSMENT: PAIN_FUNCTIONAL_ASSESSMENT: NONE - DENIES PAIN

## 2024-12-05 NOTE — PROGRESS NOTES
[facility] Progress Note    Date: 2024    Name: Catina Gonzales    MRN: [id number]         : 1995    Ms. Gonzales arrived in the [facility] today at 1540, in stable condition and accompanied by her care coordinator, here for her INVEGA SUSTENNA INJECTION (EVERY 4 WEEKS). She was assessed and education was provided.     HER CURRENT ORDER DATED 24, IS VALID FOR 3 TOTAL DOSES.    TODAY IS DOSE 1 OF 3, ON THIS CURRENT ORDER.         Ms. Gonzales's vitals were reviewed.  Vitals:    24 1540   BP: 116/76   Pulse: (!) 101   Resp: 16   Temp: 98.1 °F (36.7 °C)   SpO2: 97%         Ms. Gonzales presented to the infusion center today stating that she was doing well, and voiced no major complaints or concerns, and her care coordinator agreed that she was not having any major problems or concerns.     And, Ms. Gonzales stated that she has tolerated her past INVEGA injections well, and without any problems or issues.         Paliperidone Palmitate (INVEGA SUSTENNA) 234 mg, was administered IM, in her LEFT DELTOID at 1550, per order and without incident.           Ms. Gonzales tolerated well and voiced no complaints.     [facility]. [facility] in stable condition at 1555, with her care coordinator.     She is to return in 4 weeks, on Thursday, 25 at 1530, for her next appointment, to receive her next INVEGA SUSTENNA injection.     Aline Sanchez RN  2024  3:47 PM

## 2024-12-21 NOTE — BSMART NOTE
[facility]ER[facility]Y GROUP PROGRESS NOTE    PATIENT SCHEDULED FOR GROUP AT: 1600    ATTENDANCE: Full    PARTICIPATION LEVEL: Participates fully in the art process    ATTENTION LEVEL: Able to focus on task    FOCUS: [address] Lore Mancini:   She was cheerful, compliant, and invested. She was child-like and often would seek attention and reassurance. hide

## 2025-01-02 ENCOUNTER — HOSPITAL ENCOUNTER (OUTPATIENT)
Facility: HOSPITAL | Age: 30
Setting detail: INFUSION SERIES
End: 2025-01-02
Payer: MEDICAID

## 2025-01-02 VITALS
TEMPERATURE: 97.3 F | SYSTOLIC BLOOD PRESSURE: 123 MMHG | RESPIRATION RATE: 16 BRPM | DIASTOLIC BLOOD PRESSURE: 85 MMHG | OXYGEN SATURATION: 97 % | HEART RATE: 113 BPM

## 2025-01-02 DIAGNOSIS — F25.9 SCHIZOAFFECTIVE DISORDER, UNSPECIFIED TYPE (HCC): Primary | ICD-10-CM

## 2025-01-02 PROCEDURE — 6360000002 HC RX W HCPCS: Performed by: PHYSICIAN ASSISTANT

## 2025-01-02 PROCEDURE — 96372 THER/PROPH/DIAG INJ SC/IM: CPT

## 2025-01-02 RX ORDER — EPINEPHRINE 1 MG/ML
0.3 INJECTION, SOLUTION, CONCENTRATE INTRAVENOUS PRN
OUTPATIENT
Start: 2025-01-30

## 2025-01-02 RX ORDER — ALBUTEROL SULFATE 90 UG/1
4 INHALANT RESPIRATORY (INHALATION) PRN
OUTPATIENT
Start: 2025-01-30

## 2025-01-02 RX ORDER — ACETAMINOPHEN 325 MG/1
650 TABLET ORAL
OUTPATIENT
Start: 2025-01-30

## 2025-01-02 RX ORDER — ONDANSETRON 2 MG/ML
8 INJECTION INTRAMUSCULAR; INTRAVENOUS
OUTPATIENT
Start: 2025-01-30

## 2025-01-02 RX ORDER — SODIUM CHLORIDE 9 MG/ML
INJECTION, SOLUTION INTRAVENOUS CONTINUOUS
OUTPATIENT
Start: 2025-01-30

## 2025-01-02 RX ORDER — LORAZEPAM 2 MG/1
2 TABLET ORAL 3 TIMES DAILY
COMMUNITY

## 2025-01-02 RX ORDER — HYDROCORTISONE SODIUM SUCCINATE 100 MG/2ML
100 INJECTION INTRAMUSCULAR; INTRAVENOUS
OUTPATIENT
Start: 2025-01-30

## 2025-01-02 RX ORDER — DIPHENHYDRAMINE HYDROCHLORIDE 50 MG/ML
50 INJECTION INTRAMUSCULAR; INTRAVENOUS
OUTPATIENT
Start: 2025-01-30

## 2025-01-02 RX ADMIN — PALIPERIDONE PALMITATE 234 MG: 234 INJECTION INTRAMUSCULAR at 15:29

## 2025-01-02 NOTE — PROGRESS NOTES
[facility] Progress Note    Date: 2025    Name: Catina Gonzales    MRN: [id number]         : 1995      INVEGA INJECTION Q4 WEEKS      Ms. Gonzales arrived to [facility] at 1515 in stable condition accompanied by her caregiver. Ms. Gonzales was assessed and education was provided.     Ms. Gonzales's vitals were reviewed .  Vitals:    25 1515   BP: 123/85   Pulse: (!) 113   Resp: 16   Temp: 97.3 °F (36.3 °C)   SpO2: 97%     Patient's caregivers report patient tolerating Invega injections well.   Patient presents with flight of ideas and hyper-verbal. Caregiver reports there is no chance of pt being pregnant at this time.    INVEGA 234 MG was administered INTRAMUSCULAR in RIGHT DELTOID. Band aid to site.    Ms. Gonzales tolerated well, and had no complaints.  Patient armband removed and shredded.    [facility]. [facility] in stable condition at 1530. She is to return on 25 at 1530 for her next INVEGA INJECTION appointment.    Katlyn Banegas RN  2025  3:38 PM

## 2025-01-30 ENCOUNTER — HOSPITAL ENCOUNTER (OUTPATIENT)
Facility: HOSPITAL | Age: 30
Setting detail: INFUSION SERIES
End: 2025-01-30
Payer: MEDICAID

## 2025-01-30 VITALS
RESPIRATION RATE: 16 BRPM | OXYGEN SATURATION: 98 % | HEART RATE: 116 BPM | TEMPERATURE: 98.9 F | DIASTOLIC BLOOD PRESSURE: 93 MMHG | SYSTOLIC BLOOD PRESSURE: 122 MMHG

## 2025-01-30 DIAGNOSIS — F25.9 SCHIZOAFFECTIVE DISORDER, UNSPECIFIED TYPE (HCC): Primary | ICD-10-CM

## 2025-01-30 PROCEDURE — 96372 THER/PROPH/DIAG INJ SC/IM: CPT

## 2025-01-30 PROCEDURE — 6360000002 HC RX W HCPCS: Performed by: PHYSICIAN ASSISTANT

## 2025-01-30 RX ORDER — HYDROCORTISONE SODIUM SUCCINATE 100 MG/2ML
100 INJECTION INTRAMUSCULAR; INTRAVENOUS
OUTPATIENT
Start: 2025-01-30

## 2025-01-30 RX ORDER — EPINEPHRINE 1 MG/ML
0.3 INJECTION, SOLUTION, CONCENTRATE INTRAVENOUS PRN
OUTPATIENT
Start: 2025-01-30

## 2025-01-30 RX ORDER — ALBUTEROL SULFATE 90 UG/1
4 INHALANT RESPIRATORY (INHALATION) PRN
OUTPATIENT
Start: 2025-01-30

## 2025-01-30 RX ORDER — ONDANSETRON 2 MG/ML
8 INJECTION INTRAMUSCULAR; INTRAVENOUS
OUTPATIENT
Start: 2025-01-30

## 2025-01-30 RX ORDER — SODIUM CHLORIDE 9 MG/ML
INJECTION, SOLUTION INTRAVENOUS CONTINUOUS
OUTPATIENT
Start: 2025-01-30

## 2025-01-30 RX ORDER — DIPHENHYDRAMINE HYDROCHLORIDE 50 MG/ML
50 INJECTION INTRAMUSCULAR; INTRAVENOUS
OUTPATIENT
Start: 2025-01-30

## 2025-01-30 RX ORDER — ACETAMINOPHEN 325 MG/1
650 TABLET ORAL
OUTPATIENT
Start: 2025-01-30

## 2025-01-30 RX ADMIN — PALIPERIDONE PALMITATE 234 MG: 234 INJECTION INTRAMUSCULAR at 15:45

## 2025-01-30 ASSESSMENT — PAIN - FUNCTIONAL ASSESSMENT: PAIN_FUNCTIONAL_ASSESSMENT: 0-10

## 2025-01-30 ASSESSMENT — PAIN DESCRIPTION - DESCRIPTORS: DESCRIPTORS: SORE

## 2025-01-30 NOTE — PROGRESS NOTES
[facility] Progress Note    Date: 2025    Name: Catina Gonzales    MRN: [id number]         : 1995      INVEGA INJECTION Q4 WEEKS      Ms. Gonzales arrived to [facility] at 1525 in stable condition accompanied by her caregiver.     Ms. Gonzales was assessed and education was provided.     Ms. Gonzales's vitals were reviewed .  Vitals:    25 1528 25 1540   BP: (!) 122/93    Pulse: (!) 134 (!) 116   Resp: 16    Temp: 98.9 °F (37.2 °C)    TempSrc: Oral    SpO2: 98%       Patient presents to [facility] agitated, hyper-verbal, and with flight of ideas. Initial HR tachycardic. Provided patient with snack and water to drink and attempted to redirect patient. HR did come down to 116. Pt does c/o of sore throat. Asked caregiver if there is a way to monitor patient's HR at her group home, she states she does have a pulse oximetry device to monitor. Instructed caregiver to monitor patient's HR and if it continues to remain elevated and pt still complains of sore throat to seek medical attention to have pt evaluated. She verbalized understanding.    INVEGA SUSTENNA 234 MG was administered IM in left deltoid. Band aid to site.    Ms. Gonzales tolerated well, and had no complaints.  Patient armband removed and shredded.    Ms. [facility] was [facility] in stable condition at 1550. She is to return on 25 at 1530 for her next INVEGA INJECTION appointment.    Lucia Mohan RN  2025  3:43 PM

## 2025-02-27 ENCOUNTER — HOSPITAL ENCOUNTER (OUTPATIENT)
Facility: HOSPITAL | Age: 30
Setting detail: INFUSION SERIES
End: 2025-02-27
Payer: MEDICAID

## 2025-02-27 VITALS — TEMPERATURE: 97.9 F | HEART RATE: 111 BPM | RESPIRATION RATE: 16 BRPM

## 2025-02-27 DIAGNOSIS — F25.9 SCHIZOAFFECTIVE DISORDER, UNSPECIFIED TYPE (HCC): Primary | ICD-10-CM

## 2025-02-27 PROCEDURE — 96372 THER/PROPH/DIAG INJ SC/IM: CPT

## 2025-02-27 PROCEDURE — 6360000002 HC RX W HCPCS: Performed by: PHYSICIAN ASSISTANT

## 2025-02-27 RX ORDER — ACETAMINOPHEN 325 MG/1
650 TABLET ORAL
OUTPATIENT
Start: 2025-03-27

## 2025-02-27 RX ORDER — SODIUM CHLORIDE 9 MG/ML
INJECTION, SOLUTION INTRAVENOUS CONTINUOUS
OUTPATIENT
Start: 2025-03-27

## 2025-02-27 RX ORDER — EPINEPHRINE 1 MG/ML
0.3 INJECTION, SOLUTION, CONCENTRATE INTRAVENOUS PRN
OUTPATIENT
Start: 2025-03-27

## 2025-02-27 RX ORDER — HYDROCORTISONE SODIUM SUCCINATE 100 MG/2ML
100 INJECTION INTRAMUSCULAR; INTRAVENOUS
OUTPATIENT
Start: 2025-03-27

## 2025-02-27 RX ORDER — ALBUTEROL SULFATE 90 UG/1
4 INHALANT RESPIRATORY (INHALATION) PRN
OUTPATIENT
Start: 2025-03-27

## 2025-02-27 RX ORDER — ONDANSETRON 2 MG/ML
8 INJECTION INTRAMUSCULAR; INTRAVENOUS
OUTPATIENT
Start: 2025-03-27

## 2025-02-27 RX ORDER — DIPHENHYDRAMINE HYDROCHLORIDE 50 MG/ML
50 INJECTION INTRAMUSCULAR; INTRAVENOUS
OUTPATIENT
Start: 2025-03-27

## 2025-02-27 RX ADMIN — PALIPERIDONE PALMITATE 234 MG: 234 INJECTION INTRAMUSCULAR at 15:50

## 2025-02-27 ASSESSMENT — PAIN - FUNCTIONAL ASSESSMENT: PAIN_FUNCTIONAL_ASSESSMENT: NONE - DENIES PAIN

## 2025-02-27 NOTE — PROGRESS NOTES
[facility] Progress Note    Date: 2025    Name: Catina Gonzales    MRN: [id number]         : 1995    Ms. Gonzales arrived in the [facility] today at 1540, ambulatory and in stable condition and accompanied by her care coordinator, here for her INVEGA SUSTENNA INJECTION (EVERY 4 WEEKS). She was assessed and education was provided.     HER CURRENT ORDER DATED 25, IS VALID FOR 6 TOTAL DOSES.    TODAY IS DOSE 3 OF 6, ON THIS CURRENT ORDER.         Ms. Gonzales's vitals were reviewed.  Vitals:    25 1540   Pulse: (!) 111   Resp: 16   Temp: 97.9 °F (36.6 °C)         Ms. Gonzales presented to the infusion center today initially screaming and very hyper verbal, but shortly thereafter, she settled down in the recliner chair to receive her injection today.  She voiced no major complaints or concerns, and her care coordinator agreed that she was not having any major problems or concerns.     And, Ms. Gonzales stated that she has tolerated her past INVEGA injections well, and without any problems or issues.         Paliperidone Palmitate (INVEGA SUSTENNA) 234 mg, was administered IM, in her RIGHT DELTOID at 1550, per order and without incident.           Ms. Gonzales tolerated well and voiced no complaints.     Ms. [facility] in stable condition at 1555, with her care coordinator.     She is to return in 4 weeks, on Thursday, 3-27-25 at 1530, for her next appointment, to receive her next INVEGA SUSTENNA injection.     Aline Sanchez RN  2025  3:48 PM

## 2025-03-27 ENCOUNTER — HOSPITAL ENCOUNTER (OUTPATIENT)
Facility: HOSPITAL | Age: 30
Setting detail: INFUSION SERIES
Discharge: HOME | End: 2025-03-30
Payer: MEDICAID

## 2025-03-27 VITALS
DIASTOLIC BLOOD PRESSURE: 83 MMHG | HEART RATE: 123 BPM | SYSTOLIC BLOOD PRESSURE: 139 MMHG | TEMPERATURE: 98.9 F | RESPIRATION RATE: 16 BRPM

## 2025-03-27 DIAGNOSIS — F25.9 SCHIZOAFFECTIVE DISORDER, UNSPECIFIED TYPE: Primary | ICD-10-CM

## 2025-03-27 PROCEDURE — 6360000002 HC RX W HCPCS: Performed by: PHYSICIAN ASSISTANT

## 2025-03-27 PROCEDURE — 96372 THER/PROPH/DIAG INJ SC/IM: CPT

## 2025-03-27 RX ORDER — SODIUM CHLORIDE 9 MG/ML
INJECTION, SOLUTION INTRAVENOUS CONTINUOUS
OUTPATIENT
Start: 2025-04-24

## 2025-03-27 RX ORDER — ONDANSETRON 2 MG/ML
8 INJECTION INTRAMUSCULAR; INTRAVENOUS
OUTPATIENT
Start: 2025-04-24

## 2025-03-27 RX ORDER — EPINEPHRINE 1 MG/ML
0.3 INJECTION, SOLUTION, CONCENTRATE INTRAVENOUS PRN
OUTPATIENT
Start: 2025-04-24

## 2025-03-27 RX ORDER — HYDROCORTISONE SODIUM SUCCINATE 100 MG/2ML
100 INJECTION INTRAMUSCULAR; INTRAVENOUS
OUTPATIENT
Start: 2025-04-24

## 2025-03-27 RX ORDER — ACETAMINOPHEN 325 MG/1
650 TABLET ORAL
OUTPATIENT
Start: 2025-04-24

## 2025-03-27 RX ORDER — ALBUTEROL SULFATE 90 UG/1
4 INHALANT RESPIRATORY (INHALATION) PRN
OUTPATIENT
Start: 2025-04-24

## 2025-03-27 RX ORDER — DIPHENHYDRAMINE HYDROCHLORIDE 50 MG/ML
50 INJECTION, SOLUTION INTRAMUSCULAR; INTRAVENOUS
OUTPATIENT
Start: 2025-04-24

## 2025-03-27 RX ADMIN — PALIPERIDONE PALMITATE 234 MG: 234 INJECTION INTRAMUSCULAR at 15:42

## 2025-03-27 ASSESSMENT — PAIN - FUNCTIONAL ASSESSMENT: PAIN_FUNCTIONAL_ASSESSMENT: NONE - DENIES PAIN

## 2025-03-27 NOTE — PROGRESS NOTES
\[facility]\"" Progress Note    Date: 2025    Name: Catina Gonzales    MRN: [id number]         : 1995    Ms. Gonzales arrived in the \[facility]\"" today at 1530, ambulatory and in stable condition and accompanied by her care coordinator, here for her INVEGA SUSTENNA INJECTION (EVERY 4 WEEKS). She was assessed and education was provided.     HER CURRENT ORDER DATED 25, IS VALID FOR 6 TOTAL DOSES.    TODAY IS DOSE 4 OF 6, ON THIS CURRENT ORDER.         Ms. Gonzales's vitals were reviewed.  Vitals:    25 1530   BP: 139/83   Pulse: (!) 123   Resp: 16   Temp: 98.9 °F (37.2 °C)         Ms. Gonzales presented to the infusion center today initially screaming and very hyper verbal, but shortly thereafter, she settled down in the recliner chair to receive her injection today.  She voiced no major complaints or concerns, and her care coordinator agreed that she was not having any major problems or concerns.     And, Ms. Gonzales stated that she has tolerated her past INVEGA injections well, and without any problems or issues.         Paliperidone Palmitate (INVEGA SUSTENNA) 234 mg, was administered IM, in her LEFT DELTOID at 1542, per order and without incident.           Ms. Gonzales tolerated well and voiced no complaints.     Ms. Gonzales was [facility] in stable condition at 1545, with her care coordinator.     She is to return in 4 weeks, on Thursday, 25 at 1530, for her next appointment, to receive her next INVEGA SUSTENNA injection.     Aline Sanchez RN  2025  3:41 PM

## 2025-04-24 ENCOUNTER — HOSPITAL ENCOUNTER (OUTPATIENT)
Facility: HOSPITAL | Age: 30
Setting detail: INFUSION SERIES
Discharge: HOME | End: 2025-04-27
Payer: MEDICAID

## 2025-04-24 VITALS
RESPIRATION RATE: 20 BRPM | HEART RATE: 122 BPM | OXYGEN SATURATION: 98 % | DIASTOLIC BLOOD PRESSURE: 86 MMHG | TEMPERATURE: 98.7 F | SYSTOLIC BLOOD PRESSURE: 125 MMHG

## 2025-04-24 DIAGNOSIS — F25.9 SCHIZOAFFECTIVE DISORDER, UNSPECIFIED TYPE (HCC): Primary | ICD-10-CM

## 2025-04-24 PROCEDURE — 96372 THER/PROPH/DIAG INJ SC/IM: CPT

## 2025-04-24 PROCEDURE — 6360000002 HC RX W HCPCS: Performed by: PHYSICIAN ASSISTANT

## 2025-04-24 RX ORDER — DIPHENHYDRAMINE HYDROCHLORIDE 50 MG/ML
50 INJECTION, SOLUTION INTRAMUSCULAR; INTRAVENOUS
OUTPATIENT
Start: 2025-04-24

## 2025-04-24 RX ORDER — HYDROCORTISONE SODIUM SUCCINATE 100 MG/2ML
100 INJECTION INTRAMUSCULAR; INTRAVENOUS
OUTPATIENT
Start: 2025-04-24

## 2025-04-24 RX ORDER — ONDANSETRON 2 MG/ML
8 INJECTION INTRAMUSCULAR; INTRAVENOUS
OUTPATIENT
Start: 2025-04-24

## 2025-04-24 RX ORDER — ALBUTEROL SULFATE 90 UG/1
4 INHALANT RESPIRATORY (INHALATION) PRN
OUTPATIENT
Start: 2025-04-24

## 2025-04-24 RX ORDER — SODIUM CHLORIDE 9 MG/ML
INJECTION, SOLUTION INTRAVENOUS CONTINUOUS
OUTPATIENT
Start: 2025-04-24

## 2025-04-24 RX ORDER — ACETAMINOPHEN 325 MG/1
650 TABLET ORAL
OUTPATIENT
Start: 2025-04-24

## 2025-04-24 RX ORDER — EPINEPHRINE 1 MG/ML
0.3 INJECTION, SOLUTION, CONCENTRATE INTRAVENOUS PRN
OUTPATIENT
Start: 2025-04-24

## 2025-04-24 RX ADMIN — PALIPERIDONE PALMITATE 234 MG: 234 INJECTION INTRAMUSCULAR at 09:36

## 2025-04-24 NOTE — PROGRESS NOTES
[facility] Progress Note    Date: 2025    Name: Catina Gonzales    MRN: [id number]         : 1995    Ms. Gonzales arrived in the [facility] today at 930, ambulatory and in stable condition and accompanied by her care coordinator, here for her INVEGA SUSTENNA INJECTION (EVERY 4 WEEKS). She was assessed and education was provided.     HER CURRENT ORDER DATED 25, IS VALID FOR 6 TOTAL DOSES.    TODAY IS DOSE 5 OF 6, ON THIS CURRENT ORDER.     Ms. Gonzales's vitals were reviewed.  Vitals:    25 0934   BP: 125/86   Pulse: (!) 122   Resp: 20   Temp: 98.7 °F (37.1 °C)   SpO2: 98%     Ms. Gonzales presented to the infusion center today initially very hyper verbal, but shortly thereafter, she settled down in the recliner chair to receive her injection today.  She voiced no major complaints or concerns, and her care coordinator agreed that she was not having any major problems or concerns.     Ms. Gonzales stated that she has tolerated her past INVEGA injections well, and without any problems or issues.    Paliperidone Palmitate (INVEGA SUSTENNA) 234 mg, was administered IM, in her LEFT DELTOID, per order and without incident.     Ms. Gonzales tolerated well and voiced no complaints.     [facility] in stable condition at 0940, with her care coordinator.     Ms. Gonzales is to return in 4 weeks, on Thursday, 25 at 1530, for her next  INVEGA SUSTENNA injection appointment.    LAURA VIDES RN  2025  2:35 PM

## 2025-05-22 ENCOUNTER — HOSPITAL ENCOUNTER (OUTPATIENT)
Facility: HOSPITAL | Age: 30
Setting detail: INFUSION SERIES
Discharge: HOME | End: 2025-05-25
Payer: MEDICAID

## 2025-05-22 VITALS
TEMPERATURE: 97.5 F | OXYGEN SATURATION: 97 % | SYSTOLIC BLOOD PRESSURE: 117 MMHG | RESPIRATION RATE: 16 BRPM | HEART RATE: 113 BPM | DIASTOLIC BLOOD PRESSURE: 84 MMHG

## 2025-05-22 DIAGNOSIS — F25.9 SCHIZOAFFECTIVE DISORDER, UNSPECIFIED TYPE (HCC): Primary | ICD-10-CM

## 2025-05-22 PROCEDURE — 96372 THER/PROPH/DIAG INJ SC/IM: CPT

## 2025-05-22 PROCEDURE — 6360000002 HC RX W HCPCS: Performed by: PHYSICIAN ASSISTANT

## 2025-05-22 RX ORDER — ONDANSETRON 2 MG/ML
8 INJECTION INTRAMUSCULAR; INTRAVENOUS
OUTPATIENT
Start: 2025-06-19

## 2025-05-22 RX ORDER — DIPHENHYDRAMINE HYDROCHLORIDE 50 MG/ML
50 INJECTION, SOLUTION INTRAMUSCULAR; INTRAVENOUS
OUTPATIENT
Start: 2025-06-19

## 2025-05-22 RX ORDER — EPINEPHRINE 1 MG/ML
0.3 INJECTION, SOLUTION, CONCENTRATE INTRAVENOUS PRN
OUTPATIENT
Start: 2025-06-19

## 2025-05-22 RX ORDER — SODIUM CHLORIDE 9 MG/ML
INJECTION, SOLUTION INTRAVENOUS CONTINUOUS
OUTPATIENT
Start: 2025-06-19

## 2025-05-22 RX ORDER — MEDROXYPROGESTERONE ACETATE 150 MG/ML
150 INJECTION, SUSPENSION INTRAMUSCULAR
COMMUNITY

## 2025-05-22 RX ORDER — ACETAMINOPHEN 325 MG/1
650 TABLET ORAL
OUTPATIENT
Start: 2025-06-19

## 2025-05-22 RX ORDER — ALBUTEROL SULFATE 90 UG/1
4 INHALANT RESPIRATORY (INHALATION) PRN
OUTPATIENT
Start: 2025-06-19

## 2025-05-22 RX ORDER — HYDROCORTISONE SODIUM SUCCINATE 100 MG/2ML
100 INJECTION INTRAMUSCULAR; INTRAVENOUS
OUTPATIENT
Start: 2025-06-19

## 2025-05-22 RX ADMIN — PALIPERIDONE PALMITATE 234 MG: 234 INJECTION INTRAMUSCULAR at 15:55

## 2025-05-22 ASSESSMENT — PAIN - FUNCTIONAL ASSESSMENT: PAIN_FUNCTIONAL_ASSESSMENT: NONE - DENIES PAIN

## 2025-05-22 NOTE — PROGRESS NOTES
[facility] Progress Note    Date: May 22, 2025    Name: Catina Gonzales    MRN: [id number]         : 1995    Ms. Gonzales arrived in the [facility] today at 1540, ambulatory and in stable condition and accompanied by her care coordinator, here for her INVEGA SUSTENNA INJECTION (EVERY 4 WEEKS). She was assessed and education was provided.     HER CURRENT ORDER DATED 25, IS VALID FOR 12 TOTAL DOSES.    TODAY IS DOSE # 1 OF 12, ON THIS CURRENT ORDER.     (Please note that even though her last appointment was on 25 and the order is dated 25, the order was not received and scanned into her electronic record until AFTER her 25 [facility] appointment.)        Ms. Gonzales's vitals were reviewed.  Vitals:    25 1540   BP: 117/84   Pulse: (!) 113   Resp: 16   Temp: 97.5 °F (36.4 °C)   SpO2: 97%         Ms. Gonzales presented to the infusion center today initially screaming and very hyper verbal, but shortly thereafter, she settled down in the recliner chair to receive her injection today.  She voiced no major complaints or concerns, and her care coordinator agreed that she was not having any major problems or concerns.     And, Ms. Gonzales stated that she has tolerated her past INVEGA injections well, and without any problems or issues.         Paliperidone Palmitate (INVEGA SUSTENNA) 234 mg, was administered IM, in her RIGHT DELTOID at 1555, per order and without incident.           Ms. Gonzales tolerated well and voiced no complaints.     [facility]. [facility] was [facility] in stable condition at 1600, with her care coordinator.     She is to return in 4 weeks, on Thursday, 25 at 1530, for her next appointment, to receive her next INVEGA SUSTENNA injection.     Aline Sanchez RN  May 22, 2025  3:52 PM

## 2025-06-09 ENCOUNTER — HOSPITAL ENCOUNTER (EMERGENCY)
Facility: HOSPITAL | Age: 30
Discharge: HOME | End: 2025-06-09
Attending: STUDENT IN AN ORGANIZED HEALTH CARE EDUCATION/TRAINING PROGRAM
Payer: MEDICAID

## 2025-06-09 VITALS
HEART RATE: 79 BPM | HEIGHT: 67 IN | TEMPERATURE: 97.5 F | BODY MASS INDEX: 25.11 KG/M2 | DIASTOLIC BLOOD PRESSURE: 76 MMHG | OXYGEN SATURATION: 99 % | RESPIRATION RATE: 16 BRPM | SYSTOLIC BLOOD PRESSURE: 110 MMHG | WEIGHT: 160 LBS

## 2025-06-09 DIAGNOSIS — F44.5 PSYCHOGENIC NONEPILEPTIC SEIZURE: Primary | ICD-10-CM

## 2025-06-09 LAB
AMPHET UR QL SCN: POSITIVE
ANION GAP SERPL CALC-SCNC: 11 MMOL/L (ref 3–18)
APPEARANCE UR: CLEAR
BACTERIA URNS QL MICRO: ABNORMAL /HPF
BARBITURATES UR QL SCN: NEGATIVE
BASOPHILS # BLD: 0.02 K/UL (ref 0–0.1)
BASOPHILS NFR BLD: 0.3 % (ref 0–2)
BENZODIAZ UR QL: POSITIVE
BILIRUB UR QL: NEGATIVE
BUN SERPL-MCNC: 16 MG/DL (ref 6–23)
BUN/CREAT SERPL: 20 (ref 12–20)
CALCIUM SERPL-MCNC: 9.1 MG/DL (ref 8.5–10.1)
CANNABINOIDS UR QL SCN: NEGATIVE
CHLORIDE SERPL-SCNC: 110 MMOL/L (ref 98–107)
CO2 SERPL-SCNC: 23 MMOL/L (ref 21–32)
COCAINE UR QL SCN: NEGATIVE
COLOR UR: YELLOW
CREAT SERPL-MCNC: 0.77 MG/DL (ref 0.6–1.3)
DIFFERENTIAL METHOD BLD: ABNORMAL
EOSINOPHIL # BLD: 0.01 K/UL (ref 0–0.4)
EOSINOPHIL NFR BLD: 0.2 % (ref 0–5)
EPITH CASTS URNS QL MICRO: ABNORMAL /LPF (ref 0–5)
ERYTHROCYTE [DISTWIDTH] IN BLOOD BY AUTOMATED COUNT: 13.2 % (ref 11.6–14.5)
ETHANOL SERPL-MCNC: <11 MG/DL (ref 0–0.08)
FENTANYL: NEGATIVE
GLUCOSE SERPL-MCNC: 114 MG/DL (ref 74–108)
GLUCOSE UR STRIP.AUTO-MCNC: NEGATIVE MG/DL
HCG SERPL QL: NEGATIVE
HCT VFR BLD AUTO: 35.8 % (ref 35–45)
HGB BLD-MCNC: 11.8 G/DL (ref 12–16)
HGB UR QL STRIP: NEGATIVE
IMM GRANULOCYTES # BLD AUTO: 0.05 K/UL (ref 0–0.04)
IMM GRANULOCYTES NFR BLD AUTO: 0.8 % (ref 0–0.5)
KETONES UR QL STRIP.AUTO: ABNORMAL MG/DL
LEUKOCYTE ESTERASE UR QL STRIP.AUTO: ABNORMAL
LYMPHOCYTES # BLD: 2.03 K/UL (ref 0.9–3.6)
LYMPHOCYTES NFR BLD: 31.9 % (ref 21–52)
Lab: ABNORMAL
MCH RBC QN AUTO: 28.9 PG (ref 24–34)
MCHC RBC AUTO-ENTMCNC: 33 G/DL (ref 31–37)
MCV RBC AUTO: 87.5 FL (ref 78–100)
METHADONE UR QL: NEGATIVE
MONOCYTES # BLD: 0.45 K/UL (ref 0.05–1.2)
MONOCYTES NFR BLD: 7.1 % (ref 3–10)
NEUTS SEG # BLD: 3.8 K/UL (ref 1.8–8)
NEUTS SEG NFR BLD: 59.7 % (ref 40–73)
NITRITE UR QL STRIP.AUTO: NEGATIVE
NRBC # BLD: 0 K/UL (ref 0–0.01)
NRBC BLD-RTO: 0 PER 100 WBC
OPIATES UR QL: NEGATIVE
OXYCODONE UR QL SCN: NEGATIVE
PH UR STRIP: 7 (ref 5–8)
PLATELET # BLD AUTO: 248 K/UL (ref 135–420)
PMV BLD AUTO: 10.7 FL (ref 9.2–11.8)
POTASSIUM SERPL-SCNC: 4 MMOL/L (ref 3.5–5.5)
PROT UR STRIP-MCNC: NEGATIVE MG/DL
RBC # BLD AUTO: 4.09 M/UL (ref 4.2–5.3)
RBC #/AREA URNS HPF: ABNORMAL /HPF (ref 0–5)
SODIUM SERPL-SCNC: 144 MMOL/L (ref 136–145)
SP GR UR REFRACTOMETRY: 1.02 (ref 1–1.03)
UROBILINOGEN UR QL STRIP.AUTO: 1 EU/DL (ref 0.2–1)
WBC # BLD AUTO: 6.4 K/UL (ref 4.6–13.2)
WBC URNS QL MICRO: ABNORMAL /HPF (ref 0–4)

## 2025-06-09 PROCEDURE — 99284 EMERGENCY DEPT VISIT MOD MDM: CPT

## 2025-06-09 PROCEDURE — 80048 BASIC METABOLIC PNL TOTAL CA: CPT

## 2025-06-09 PROCEDURE — 93005 ELECTROCARDIOGRAM TRACING: CPT | Performed by: STUDENT IN AN ORGANIZED HEALTH CARE EDUCATION/TRAINING PROGRAM

## 2025-06-09 PROCEDURE — 94762 N-INVAS EAR/PLS OXIMTRY CONT: CPT

## 2025-06-09 PROCEDURE — 85025 COMPLETE CBC W/AUTO DIFF WBC: CPT

## 2025-06-09 PROCEDURE — 80307 DRUG TEST PRSMV CHEM ANLYZR: CPT

## 2025-06-09 PROCEDURE — 81001 URINALYSIS AUTO W/SCOPE: CPT

## 2025-06-09 PROCEDURE — 82077 ASSAY SPEC XCP UR&BREATH IA: CPT

## 2025-06-09 PROCEDURE — 94760 N-INVAS EAR/PLS OXIMETRY 1: CPT

## 2025-06-09 PROCEDURE — 84703 CHORIONIC GONADOTROPIN ASSAY: CPT

## 2025-06-09 NOTE — ED TRIAGE NOTES
Pt arrives via EMS for c/o starring off in space and shaking. Pt has PMX of Pseudo seizures   Pt was at a transportation unit and was headed somewhere with her group home workers.

## 2025-06-10 LAB
EKG ATRIAL RATE: 82 BPM
EKG DIAGNOSIS: NORMAL
EKG P AXIS: 58 DEGREES
EKG P-R INTERVAL: 146 MS
EKG Q-T INTERVAL: 386 MS
EKG QRS DURATION: 86 MS
EKG QTC CALCULATION (BAZETT): 450 MS
EKG R AXIS: 59 DEGREES
EKG T AXIS: 57 DEGREES
EKG VENTRICULAR RATE: 82 BPM

## 2025-06-10 PROCEDURE — 93010 ELECTROCARDIOGRAM REPORT: CPT | Performed by: INTERNAL MEDICINE

## 2025-06-10 NOTE — ED NOTES
at bedside.  given discharge instructions and verbalized understanding. Patient ambulatory out of department with .  
Assumed care of patient. Patient resting on stretcher with no complaints.  at bedside.  
Patient ambulated to bathroom with standby assist. Urine sample collected.  
History:   Procedure Laterality Date    BRONCHOSCOPY         Family History:  Family History   Problem Relation Age of Onset    No Known Problems Mother     No Known Problems Father     Cancer Maternal Aunt        Social History:   Social History     Tobacco Use    Smoking status: Never    Smokeless tobacco: Never   Substance Use Topics    Alcohol use: Yes    Drug use: No       Allergies:  Allergies   Allergen Reactions    Other/Food      Other Reaction(s): Unknown (comments)    Birth control pills         Physical Exam     Vitals:    06/09/25 1808 06/09/25 2000   BP: 109/85 110/76   Pulse: 86 79   Resp: 16 16   Temp: 98.1 °F (36.7 °C) 97.5 °F (36.4 °C)   TempSrc: Oral Oral   SpO2: 98% 99%   Weight: 72.6 kg (160 lb)    Height: 1.702 m (5' 7\")        Physical Exam  Constitutional:       General: She is not in acute distress.     Appearance: Normal appearance. She is not ill-appearing or toxic-appearing.   HENT:      Head: Normocephalic and atraumatic.      Nose: Nose normal.   Eyes:      Conjunctiva/sclera: Conjunctivae normal.      Pupils: Pupils are equal, round, and reactive to light.   Cardiovascular:      Rate and Rhythm: Normal rate and regular rhythm.      Pulses: Normal pulses.      Heart sounds: Normal heart sounds.   Pulmonary:      Effort: Pulmonary effort is normal.      Breath sounds: Normal breath sounds.   Abdominal:      General: Abdomen is flat.      Palpations: Abdomen is soft.   Musculoskeletal:         General: Normal range of motion.      Cervical back: Normal range of motion. No rigidity.   Skin:     General: Skin is warm.      Capillary Refill: Capillary refill takes less than 2 seconds.   Neurological:      General: No focal deficit present.      Mental Status: She is alert. Mental status is at baseline.      Comments: Moving all 4 extremity spontaneously   Psychiatric:      Comments: Cooperative.           Diagnostic Study Results     Labs -     Recent Results (from the past 12 hours)   CBC

## 2025-06-10 NOTE — DISCHARGE INSTRUCTIONS
Please continue to take your home medications.  No additional interventions necessary at this time.    If you develop any confusion, headache nausea vomiting, fevers please return to the emergency department.    Is a pleasure to care for you today.

## 2025-06-19 ENCOUNTER — HOSPITAL ENCOUNTER (OUTPATIENT)
Facility: HOSPITAL | Age: 30
Setting detail: INFUSION SERIES
Discharge: HOME | End: 2025-06-22
Payer: MEDICAID

## 2025-06-19 VITALS
OXYGEN SATURATION: 97 % | SYSTOLIC BLOOD PRESSURE: 108 MMHG | DIASTOLIC BLOOD PRESSURE: 77 MMHG | RESPIRATION RATE: 16 BRPM | HEART RATE: 110 BPM | TEMPERATURE: 97.7 F

## 2025-06-19 DIAGNOSIS — F25.9 SCHIZOAFFECTIVE DISORDER, UNSPECIFIED TYPE (HCC): Primary | ICD-10-CM

## 2025-06-19 PROCEDURE — 6360000002 HC RX W HCPCS: Performed by: PHYSICIAN ASSISTANT

## 2025-06-19 PROCEDURE — 96372 THER/PROPH/DIAG INJ SC/IM: CPT

## 2025-06-19 RX ORDER — SODIUM CHLORIDE 9 MG/ML
INJECTION, SOLUTION INTRAVENOUS CONTINUOUS
OUTPATIENT
Start: 2025-07-17

## 2025-06-19 RX ORDER — HYDROCORTISONE SODIUM SUCCINATE 100 MG/2ML
100 INJECTION INTRAMUSCULAR; INTRAVENOUS
OUTPATIENT
Start: 2025-07-17

## 2025-06-19 RX ORDER — ACETAMINOPHEN 325 MG/1
650 TABLET ORAL
OUTPATIENT
Start: 2025-07-17

## 2025-06-19 RX ORDER — EPINEPHRINE 1 MG/ML
0.3 INJECTION, SOLUTION, CONCENTRATE INTRAVENOUS PRN
OUTPATIENT
Start: 2025-07-17

## 2025-06-19 RX ORDER — ALBUTEROL SULFATE 90 UG/1
4 INHALANT RESPIRATORY (INHALATION) PRN
OUTPATIENT
Start: 2025-07-17

## 2025-06-19 RX ORDER — DIPHENHYDRAMINE HYDROCHLORIDE 50 MG/ML
50 INJECTION, SOLUTION INTRAMUSCULAR; INTRAVENOUS
OUTPATIENT
Start: 2025-07-17

## 2025-06-19 RX ORDER — ONDANSETRON 2 MG/ML
8 INJECTION INTRAMUSCULAR; INTRAVENOUS
OUTPATIENT
Start: 2025-07-17

## 2025-06-19 RX ADMIN — PALIPERIDONE PALMITATE 234 MG: 234 INJECTION INTRAMUSCULAR at 14:25

## 2025-06-19 NOTE — PROGRESS NOTES
\[facility]\"" Progress Note    Date: 2025    Name: Catina Gonzales    MRN: [id number]         : 1995    Ms. Gonzales arrived in the \[facility]"" today at 1420, ambulatory and in stable condition and accompanied by her care coordinator, here for her INVEGA SUSTENNA INJECTION (EVERY 4 WEEKS). She was assessed and education was provided.     HER CURRENT ORDER DATED 25, IS VALID FOR 12 TOTAL DOSES.    TODAY IS DOSE 2 OF 12, ON THIS CURRENT ORDER.     Ms. Gonzales's vitals were reviewed.  Vitals:    25 1423   BP: 108/77   Pulse: (!) 110   Resp: 16   Temp: 97.7 °F (36.5 °C)   SpO2: 97%     Ms. Gonzales presented to the infusion center today initially hyper verbal, but shortly thereafter, she settled down in the recliner chair to receive her injection today.  She voiced no major complaints or concerns, and her care coordinator agreed that she was not having any major problems or concerns.     Ms. Gonzales stated that she has tolerated her past INVEGA injections well, and without any problems or issues.    Paliperidone Palmitate (INVEGA SUSTENNA) 234 mg, was administered IM, in her LEFT DELTOID, per order and without incident.     Ms. Gonzales tolerated well and voiced no complaints.     [facility]. [facility] in stable condition at 1430, with her care coordinator.     Ms. Gonzales is to return in 4 weeks, on Thursday, 25 at 1530, for her next  INVEGA SUSTENNA injection appointment.    LAURA VIDES RN  2025  3:13 PM

## 2025-07-17 ENCOUNTER — HOSPITAL ENCOUNTER (OUTPATIENT)
Facility: HOSPITAL | Age: 30
Setting detail: INFUSION SERIES
Discharge: HOME | End: 2025-07-20
Payer: MEDICAID

## 2025-07-17 VITALS
HEART RATE: 119 BPM | RESPIRATION RATE: 16 BRPM | SYSTOLIC BLOOD PRESSURE: 128 MMHG | OXYGEN SATURATION: 97 % | TEMPERATURE: 97 F | DIASTOLIC BLOOD PRESSURE: 90 MMHG

## 2025-07-17 DIAGNOSIS — F25.9 SCHIZOAFFECTIVE DISORDER, UNSPECIFIED TYPE (HCC): Primary | ICD-10-CM

## 2025-07-17 PROCEDURE — 6360000002 HC RX W HCPCS: Performed by: PHYSICIAN ASSISTANT

## 2025-07-17 PROCEDURE — 96372 THER/PROPH/DIAG INJ SC/IM: CPT

## 2025-07-17 RX ORDER — ONDANSETRON 2 MG/ML
8 INJECTION INTRAMUSCULAR; INTRAVENOUS
OUTPATIENT
Start: 2025-08-14

## 2025-07-17 RX ORDER — SODIUM CHLORIDE 9 MG/ML
INJECTION, SOLUTION INTRAVENOUS CONTINUOUS
OUTPATIENT
Start: 2025-08-14

## 2025-07-17 RX ORDER — ACETAMINOPHEN 325 MG/1
650 TABLET ORAL
OUTPATIENT
Start: 2025-08-14

## 2025-07-17 RX ORDER — ALBUTEROL SULFATE 90 UG/1
4 INHALANT RESPIRATORY (INHALATION) PRN
OUTPATIENT
Start: 2025-08-14

## 2025-07-17 RX ORDER — EPINEPHRINE 1 MG/ML
0.3 INJECTION, SOLUTION, CONCENTRATE INTRAVENOUS PRN
OUTPATIENT
Start: 2025-08-14

## 2025-07-17 RX ORDER — DIPHENHYDRAMINE HYDROCHLORIDE 50 MG/ML
50 INJECTION, SOLUTION INTRAMUSCULAR; INTRAVENOUS
OUTPATIENT
Start: 2025-08-14

## 2025-07-17 RX ORDER — HYDROCORTISONE SODIUM SUCCINATE 100 MG/2ML
100 INJECTION INTRAMUSCULAR; INTRAVENOUS
OUTPATIENT
Start: 2025-08-14

## 2025-07-17 RX ADMIN — PALIPERIDONE PALMITATE 234 MG: 234 INJECTION INTRAMUSCULAR at 15:52

## 2025-07-17 NOTE — PROGRESS NOTES
[facility] Progress Note    Date: 2025    Name: Catina Gonzales    MRN: [id number]         : 1995      INVEGA INJECTION Q4 WEEKS      Ms. Gonzales arrived to [facility] at 1540 in stable condition accompanied by her caregiver. Patient hyper-verbal with flight of ideas, and aggressive today. No complaints of pain. Caregiver advocating for patient and manages to reassure/pacify patient.       Ms. Gonzales was assessed and education was provided.     Ms. Gonzlaes's vitals were reviewed .  Vitals:    25 1540   BP: (!) 128/90   Pulse: (!) 119   Resp: 16   Temp: 97 °F (36.1 °C)   SpO2: 97%     Patient's caregiver report patient tolerating Invega injections well.      Caregiver report there is no chance of pt being pregnant at this time.    INVEGA 234 MG was administered INTRAMUSCULAR in RIGHT DELTOID. No irritation or bleeding at site. Band aid to site.     Discharge instructions done with care giver, who stated understanding.    Ms. Gonzales tolerated well, and had no complaints.  Patient armband removed and shredded.    [facility]. [facility] was [facility] in stable condition at 1555 pm. She is to return on 25 at 1555 for her next INVEGA INJECTION appointment.    TANIA HURLEY RN  2025  4:30 PM

## 2025-08-14 ENCOUNTER — HOSPITAL ENCOUNTER (OUTPATIENT)
Facility: HOSPITAL | Age: 30
Setting detail: INFUSION SERIES
Discharge: HOME | End: 2025-08-17
Payer: MEDICAID

## 2025-08-14 VITALS
RESPIRATION RATE: 16 BRPM | OXYGEN SATURATION: 97 % | TEMPERATURE: 97.4 F | HEART RATE: 106 BPM | DIASTOLIC BLOOD PRESSURE: 83 MMHG | SYSTOLIC BLOOD PRESSURE: 116 MMHG

## 2025-08-14 DIAGNOSIS — F25.9 SCHIZOAFFECTIVE DISORDER, UNSPECIFIED TYPE (HCC): Primary | ICD-10-CM

## 2025-08-14 PROCEDURE — 96372 THER/PROPH/DIAG INJ SC/IM: CPT

## 2025-08-14 PROCEDURE — 6360000002 HC RX W HCPCS: Performed by: PHYSICIAN ASSISTANT

## 2025-08-14 RX ORDER — ONDANSETRON 2 MG/ML
8 INJECTION INTRAMUSCULAR; INTRAVENOUS
OUTPATIENT
Start: 2025-09-11

## 2025-08-14 RX ORDER — SODIUM CHLORIDE 9 MG/ML
INJECTION, SOLUTION INTRAVENOUS CONTINUOUS
OUTPATIENT
Start: 2025-09-11

## 2025-08-14 RX ORDER — ACETAMINOPHEN 325 MG/1
650 TABLET ORAL
OUTPATIENT
Start: 2025-09-11

## 2025-08-14 RX ORDER — DIPHENHYDRAMINE HYDROCHLORIDE 50 MG/ML
50 INJECTION, SOLUTION INTRAMUSCULAR; INTRAVENOUS
OUTPATIENT
Start: 2025-09-11

## 2025-08-14 RX ORDER — EPINEPHRINE 1 MG/ML
0.3 INJECTION, SOLUTION, CONCENTRATE INTRAVENOUS PRN
OUTPATIENT
Start: 2025-09-11

## 2025-08-14 RX ORDER — HYDROCORTISONE SODIUM SUCCINATE 100 MG/2ML
100 INJECTION INTRAMUSCULAR; INTRAVENOUS
OUTPATIENT
Start: 2025-09-11

## 2025-08-14 RX ORDER — ALBUTEROL SULFATE 90 UG/1
4 INHALANT RESPIRATORY (INHALATION) PRN
OUTPATIENT
Start: 2025-09-11

## 2025-08-14 RX ADMIN — PALIPERIDONE PALMITATE 234 MG: 234 INJECTION INTRAMUSCULAR at 15:40

## 2025-08-14 ASSESSMENT — PAIN - FUNCTIONAL ASSESSMENT: PAIN_FUNCTIONAL_ASSESSMENT: 0-10
